# Patient Record
Sex: FEMALE | Race: WHITE | NOT HISPANIC OR LATINO | Employment: UNEMPLOYED | URBAN - METROPOLITAN AREA
[De-identification: names, ages, dates, MRNs, and addresses within clinical notes are randomized per-mention and may not be internally consistent; named-entity substitution may affect disease eponyms.]

---

## 2017-01-05 ENCOUNTER — APPOINTMENT (OUTPATIENT)
Dept: NUTRITION | Facility: HOSPITAL | Age: 47
End: 2017-01-05
Attending: ANESTHESIOLOGY

## 2017-02-02 ENCOUNTER — OFFICE VISIT (OUTPATIENT)
Dept: NUTRITION | Facility: HOSPITAL | Age: 47
End: 2017-02-02
Attending: ANESTHESIOLOGY

## 2017-02-02 VITALS — WEIGHT: 189.8 LBS | HEIGHT: 63 IN | BODY MASS INDEX: 33.63 KG/M2

## 2017-02-02 PROCEDURE — 97802 MEDICAL NUTRITION INDIV IN: CPT

## 2017-02-02 NOTE — CONSULTS
Adult Outpatient Nutrition  Assessment/PES    Patient Name:  Gino Maguire  YOB: 1970  MRN: [id number]    Assessment Date:  2/2/2017          General Info       02/02/17 1059    Today's Session    Person(s) attending today's session Patient     Services Used Today? No    General Information    How well do you speak English? very well    Do you speak a language other than English at home? no    Are you able to read and write English? Yes    Lives With spouse;child(erik), dependent   7 people total             Physical Findings       02/02/17 1219    Physical Findings/Assessment    Additional Documentation Physical Appearance (Group)    Physical Appearance    Overall Physical Appearance obese              Nutritional Info/Activity       02/02/17 1100    Nutritional Information    Have you had weight changes? Yes    Describe weight changes 30# gain since June, stress/emotional eating and lack of activity from foot and hip pain     What is your desired body weight? 72.6 kg (160 lb)   145-160#     Have you tried to lose weight before? Yes    List programs tried, date, and success Exercise, Weight Watchers    What is your motivation to lose weight? Health    Food Allergies/Intolerances --   fresh tomatoes, dairy, peanuts, soy,     History of eating disorder? No    Do you have difficulty chewing food? No    Who Prepares Meals self    List any food cravings/trigger foods you have chocolate, coffee drinks, sometimes icecream, cooking with butter     How often during the day do you find yourself snacking? none, unless stress eating     Food Behaviors Stress eater;Comfort eater    How often do you eat out and where? 4 times per week, usually dinner, fast food or sit down chain     Do you use Food Assistance programs (WIC, food stamps, food bank)? no    Do you need information about Food Assistance programs? no    How many times do you drink milk per day? 0   almond milk, no dairy     How many times  "do you eat fruit per day? 3    How many times do you eat vegetables per day? 3    How many times do you drink juice per day? 1    How many times do you eat candy/chocolates per day? 3   3-4    How many times do you eat ice cream per day? 1   was nightly, but not currently     How many regular sodas do you drink per day? 0    How many times do you have caffeine per day? 5   4-5, someitmes black, vanilla almond milk, reg almond milk, or starbucks  frap     How many meals do you eat each day? 2   skip breakfast     How many snacks do you eat each day? --   stress eats through out the day     What is the biggest challenge you have with your diet? Poor choices;Eating out;Other (comment)   stress eating snacks     What type of support do you currently use to help you with your health issues? family     Enter everything you can remember eating in the last 24 hours (1 day) coffee vanilla almond milk, 2 pieces of wheat toast with butter and strawberry jam, OJ, 2 bottles of water, cheese burger, french fries, sweet tea, panera estrellita soup, grilled cheese, sweet tea     Eating Environment    Eating environment Family    Physical Activity    Are you currently involved in an activity/exercise program?  No    Reasons for Inactivity Limited activity   limited by pain, was previously walking, and yoga               Estimated/Assessed Needs       02/02/17 1123    Calculation Measurements    Weight Used For Calculations 86.1 kg (189 lb 12.8 oz)    Height Used for Calculations 1.6 m (5' 3\")    Estimated/Assessed Energy Needs    Energy Need Method [facility]    Age 46    [facility] ([facility]) 1460.06    Activity Factors ([place] [facility] Jeor)  Confined to bed  1.2    Estimated Kcal Range  1200 - 1300 calories for gradual weight loss                 Problem/Interventions:        Problem 1       02/02/17 1134    Nutrition Diagnoses Problem 1    Problem 1 Overweight/Obesity    Etiology (related to) Factors Affecting Nutrition    " Food Habit/Preferences Other   eating out and sweets     Signs/Symptoms (evidenced by) BMI    BMI 30 - 34.9                    Intervention Goal       02/02/17 1221    Intervention Goal    General Maintain nutrition    PO Meet estimated needs    Weight Appropriate weight loss              Comments:  Patient has a desire to lose weight.         Health literacy assessment not completed.      Patient describes problems with eating with emotional and stress eating.  Patient does not use artificial sweeteners and chooses sugary drinks throughout the day.  Patient previously was very successful following a gestational diabetes meal plan.      During the session we discussed the plate method, reading nutrition labels, and meal planning. We also discussed healthy food selection.      Patient was given a [facility] Weight Loss Toolkit, recommended meal plan, and sample menu.      Patient appeared to be engaged during the session and responded appropriately to questions.       Goals:  1. Follow 1918-2778 calorie meal plan by carb counting.  2. Have foot injection by next appointment. So she can start walking again.    3.  Lose weight .5# per/2# per month.    4.    Patient was provided with RD's contact information. Follow up scheduled for 02/23/17 @ 10:30 AM.        Thank you for this referral.    Electronically signed by:  Sydnee Candelario RD  02/02/17 4:35 PM

## 2017-02-06 ENCOUNTER — OUTSIDE FACILITY SERVICE (OUTPATIENT)
Dept: PAIN MEDICINE | Facility: CLINIC | Age: 47
End: 2017-02-06

## 2017-02-06 PROCEDURE — 99152 MOD SED SAME PHYS/QHP 5/>YRS: CPT | Performed by: ANESTHESIOLOGY

## 2017-02-06 PROCEDURE — 64483 NJX AA&/STRD TFRM EPI L/S 1: CPT | Performed by: ANESTHESIOLOGY

## 2017-03-08 ENCOUNTER — OFFICE VISIT (OUTPATIENT)
Dept: NUTRITION | Facility: HOSPITAL | Age: 47
End: 2017-03-08
Attending: ANESTHESIOLOGY

## 2017-03-08 VITALS — WEIGHT: 186 LBS | HEIGHT: 63 IN | BODY MASS INDEX: 32.96 KG/M2

## 2017-03-08 NOTE — PROGRESS NOTES
Adult Outpatient Nutrition  Assessment    Patient Name:  Gino Maguire  YOB: 1970  MRN: [id number]        Assessment Date:  3/8/2017                        Comments:  Patient is seen today for ongoing weight management and follow up. Today's weight 186#. Patient lost 3.8# since her previous  visit.  Patient has been walking daily at least 20-30 minutes.  She prefers to walk in her neighborhood, but walks at the mall when the weather is bad.  She has not been counting her calories or carbohydrates, but she is focusing on making better choices to reduce her calorie intake.  She has also been focusing on drinking more water and less soda.  Patient has been experiencing some season depression and continued grief from the loss of her father in law.  She is also feeling very stressed by the lack of organization in her home.      Patient would like to continue with her current habits and focus on dealings with some of the other issues with which she is currently struggling.  Patient is planning to work with a Phycologist and see a counselor.      Patient encouraged to to continue with postive lifestyle changes.     Goals:  1. Follow 2655-1431 calorie meal plan by carb counting. - 50%   2. Have foot injection by next appointment. So she can start walking again. - 100%   3. Lose weight .5# per/2# per month. - 100%     A continued follow up is scheduled for 04/19/17 @ 11:15 AM.  Thank you for this referral.          Electronically signed by:  Sydnee Candelario RD  03/08/17 5:06 PM

## 2017-04-07 ENCOUNTER — OFFICE VISIT (OUTPATIENT)
Dept: INTERNAL MEDICINE | Facility: CLINIC | Age: 47
End: 2017-04-07

## 2017-04-07 VITALS
HEART RATE: 67 BPM | BODY MASS INDEX: 34.91 KG/M2 | OXYGEN SATURATION: 100 % | WEIGHT: 197 LBS | SYSTOLIC BLOOD PRESSURE: 128 MMHG | TEMPERATURE: 98.4 F | DIASTOLIC BLOOD PRESSURE: 84 MMHG | HEIGHT: 63 IN

## 2017-04-07 DIAGNOSIS — R23.3 EASY BRUISING: ICD-10-CM

## 2017-04-07 DIAGNOSIS — N83.201 CYST OF RIGHT OVARY: ICD-10-CM

## 2017-04-07 DIAGNOSIS — R53.83 FATIGUE, UNSPECIFIED TYPE: Primary | ICD-10-CM

## 2017-04-07 DIAGNOSIS — E55.9 VITAMIN D DEFICIENCY: ICD-10-CM

## 2017-04-07 PROCEDURE — 99214 OFFICE O/P EST MOD 30 MIN: CPT | Performed by: HOSPITALIST

## 2017-04-07 RX ORDER — NORTRIPTYLINE HYDROCHLORIDE 10 MG/1
1-2 CAPSULE ORAL NIGHTLY
Refills: 0 | COMMUNITY
Start: 2017-03-20 | End: 2018-01-09

## 2017-04-07 RX ORDER — BUPROPION HYDROCHLORIDE 300 MG/1
150 TABLET ORAL DAILY
COMMUNITY
End: 2017-09-22

## 2017-04-07 RX ORDER — FLUOXETINE HYDROCHLORIDE 40 MG/1
1 CAPSULE ORAL DAILY
Refills: 0 | COMMUNITY
Start: 2017-03-06 | End: 2017-06-05

## 2017-04-07 NOTE — PROGRESS NOTES
Subjective   Gino Maguire is a 47 y.o. female. URI; Bleeding/Bruising; requesting lab work; Med Refill (vitamin D); Back Pain (lower back); and Hip Pain         HPI Comments: She is bruising easily for the last few months    URI    Associated symptoms include nausea.   Back Pain     Hip Pain          The following portions of the patient's history were reviewed and updated as appropriate: allergies, current medications, past family history, past medical history, past social history, past surgical history and problem list.    Review of Systems   Constitutional: Positive for fatigue.   Gastrointestinal: Positive for nausea.   Musculoskeletal: Positive for back pain.   Hematological: Bruises/bleeds easily.       Objective   Vitals:    04/07/17 1215   BP: 128/84   Pulse: 67   Temp: 98.4 °F (36.9 °C)   SpO2: 100%       Physical Exam   Constitutional: She is oriented to person, place, and time. She appears well-developed and well-nourished.   HENT:   Head: Normocephalic and atraumatic.   Right Ear: External ear normal.   Left Ear: External ear normal.   Eyes: Conjunctivae and EOM are normal. Pupils are equal, round, and reactive to light.   Neck: Normal range of motion. Neck supple.   Cardiovascular: Normal rate, regular rhythm and normal heart sounds.    Pulmonary/Chest: Effort normal and breath sounds normal.   Abdominal: Soft. Bowel sounds are normal. She exhibits no distension. There is tenderness.   ruq   Musculoskeletal: Normal range of motion.   Neurological: She is alert and oriented to person, place, and time.   Skin: Skin is warm and dry.   Psychiatric: She has a normal mood and affect. Her behavior is normal. Judgment and thought content normal.       Assessment/Plan   Gino was seen today for uri, bleeding/bruising, requesting lab work, med refill, back pain and hip pain.    Diagnoses and all orders for this visit:    Fatigue, unspecified type  -     Comprehensive metabolic panel  -     CBC &  Differential  -     Protime-INR    Easy bruising  -     CBC & Differential  -     APTT  -     Protime-INR    Cyst of right ovary  -     US Non-ob Transvaginal        Results for orders placed or performed during the hospital encounter of 11/09/16   Comprehensive Metabolic Panel   Result Value Ref Range    Glucose 85 70 - 100 mg/dL    BUN 11 9 - 23 mg/dL    Creatinine 0.90 0.60 - 1.30 mg/dL    Sodium 137 132 - 146 mmol/L    Potassium 4.6 3.5 - 5.5 mmol/L    Chloride 100 99 - 109 mmol/L    CO2 33.0 (H) 20.0 - 31.0 mmol/L    Calcium 10.0 8.7 - 10.4 mg/dL    Total Protein 8.8 (H) 5.7 - 8.2 g/dL    Albumin 5.10 (H) 3.20 - 4.80 g/dL    ALT (SGPT) 40 7 - 40 U/L    AST (SGOT) 35 (H) 0 - 33 U/L    Alkaline Phosphatase 114 (H) 25 - 100 U/L    Total Bilirubin 0.7 0.3 - 1.2 mg/dL    eGFR Non African Amer 67 >60 mL/min/1.73    Globulin 3.7 gm/dL    A/G Ratio 1.4 g/dL    BUN/Creatinine Ratio 12.2 7.0 - 25.0    Anion Gap 4.0 3.0 - 11.0 mmol/L   Lipase   Result Value Ref Range    Lipase 21 6 - 51 U/L   Urinalysis With / Culture If Indicated   Result Value Ref Range    Color, UA Yellow Yellow, Straw    Appearance, UA Clear Clear    pH, UA 6.0 5.0 - 8.0    Specific Gravity, UA 1.011 1.001 - 1.030    Glucose, UA Negative Negative    Ketones, UA Negative Negative    Bilirubin, UA Negative Negative    Blood, UA Negative Negative    Protein, UA Negative Negative    Leuk Esterase, UA Negative Negative    Nitrite, UA Negative Negative    Urobilinogen, UA 0.2 E.U./dL 0.2 E.U./dL, 1.0 E.U./dL   CBC Auto Differential   Result Value Ref Range    WBC 10.25 3.50 - 10.80 10*3/mm3    RBC 4.78 3.89 - 5.14 10*6/mm3    Hemoglobin 14.2 11.5 - 15.5 g/dL    Hematocrit 43.0 34.5 - 44.0 %    MCV 90.0 80.0 - 99.0 fL    MCH 29.7 27.0 - 31.0 pg    MCHC 33.0 32.0 - 36.0 g/dL    RDW 15.1 (H) 11.3 - 14.5 %    RDW-SD 49.6 37.0 - 54.0 fl    MPV 9.5 6.0 - 12.0 fL    Platelets 350 150 - 450 10*3/mm3    Neutrophil % 63.9 41.0 - 71.0 %    Lymphocyte % 27.8 24.0 -  44.0 %    Monocyte % 4.3 0.0 - 12.0 %    Eosinophil % 3.6 (H) 0.0 - 3.0 %    Basophil % 0.2 0.0 - 1.0 %    Immature Grans % 0.2 0.0 - 0.6 %    Neutrophils, Absolute 6.55 1.50 - 8.30 10*3/mm3    Lymphocytes, Absolute 2.85 0.60 - 4.80 10*3/mm3    Monocytes, Absolute 0.44 0.00 - 1.00 10*3/mm3    Eosinophils, Absolute 0.37 (H) 0.10 - 0.30 10*3/mm3    Basophils, Absolute 0.02 0.00 - 0.20 10*3/mm3    Immature Grans, Absolute 0.02 0.00 - 0.03 10*3/mm3   POCT pregnancy, urine   Result Value Ref Range    HCG, Urine, QL Negative Negative    Lot Number [id number]     Internal Positive Control Positive     Internal Negative Control Negative    Light Blue Top   Result Value Ref Range    Extra Tube hold for add-on    Green Top (Gel)   Result Value Ref Range    Extra Tube Hold for add-ons.    Lavender Top   Result Value Ref Range    Extra Tube hold for add-on    Gold Top - SST   Result Value Ref Range    Extra Tube Hold for add-ons.

## 2017-04-08 ENCOUNTER — HOSPITAL ENCOUNTER (OUTPATIENT)
Dept: ULTRASOUND IMAGING | Facility: HOSPITAL | Age: 47
End: 2017-04-08
Attending: HOSPITALIST

## 2017-04-14 ENCOUNTER — HOSPITAL ENCOUNTER (OUTPATIENT)
Dept: ULTRASOUND IMAGING | Facility: HOSPITAL | Age: 47
Discharge: HOME | End: 2017-04-14
Attending: HOSPITALIST | Admitting: HOSPITALIST

## 2017-04-14 PROCEDURE — 76830 TRANSVAGINAL US NON-OB: CPT

## 2017-04-14 RX ORDER — GABAPENTIN 100 MG/1
CAPSULE ORAL
Qty: 120 CAPSULE | Refills: 3 | Status: SHIPPED | OUTPATIENT
Start: 2017-04-14 | End: 2017-08-24 | Stop reason: SDUPTHER

## 2017-04-21 ENCOUNTER — TELEPHONE (OUTPATIENT)
Dept: INTERNAL MEDICINE | Facility: CLINIC | Age: 47
End: 2017-04-21

## 2017-04-21 NOTE — TELEPHONE ENCOUNTER
Returned pt's call and advised of US test result per lab letter, pt also wants to know if her results of her bloodwork, Dr. Cesar blood work is scanned into pt's chart, not sure if you were referring to blood work also in the letter or just the US, pls advise.

## 2017-05-17 ENCOUNTER — HOSPITAL ENCOUNTER (OUTPATIENT)
Dept: NUTRITION | Facility: HOSPITAL | Age: 47
Setting detail: RECURRING SERIES
Discharge: HOME | End: 2017-05-17
Attending: ANESTHESIOLOGY

## 2017-05-17 VITALS — WEIGHT: 197 LBS | HEIGHT: 63 IN | BODY MASS INDEX: 34.91 KG/M2

## 2017-05-17 PROCEDURE — 97803 MED NUTRITION INDIV SUBSEQ: CPT

## 2017-06-05 ENCOUNTER — OFFICE VISIT (OUTPATIENT)
Dept: INTERNAL MEDICINE | Facility: CLINIC | Age: 47
End: 2017-06-05

## 2017-06-05 VITALS
HEART RATE: 74 BPM | WEIGHT: 196.6 LBS | SYSTOLIC BLOOD PRESSURE: 112 MMHG | OXYGEN SATURATION: 99 % | RESPIRATION RATE: 18 BRPM | BODY MASS INDEX: 34.83 KG/M2 | DIASTOLIC BLOOD PRESSURE: 78 MMHG

## 2017-06-05 DIAGNOSIS — M25.551 PAIN OF RIGHT HIP JOINT: Primary | ICD-10-CM

## 2017-06-05 PROCEDURE — 99213 OFFICE O/P EST LOW 20 MIN: CPT | Performed by: PHYSICIAN ASSISTANT

## 2017-06-05 NOTE — PROGRESS NOTES
Chief Complaint   Patient presents with   • Follow-up     Hip Pain       Subjective   Gino Maguire is a 47 y.o. female.       History of Present Illness     Pt has had ongoing right hip pain for years. She knows there is mild arthritis in her hip, based on xray. Has seen Dr Linda for her lower back- her lower back pain has gotten better since the injections. Feels like the hip pain is getting worse instead of better. She has appt for PT scheduled. Feels like she needs to walk to lose weight. She notes that she did PT for her hip in the past without any improvement. No injury that she can remember. Started hurting about 6 years ago, worse when she laid on her right side. Over the last 3 years it has gotten worse, more painful with all activities.       Current Outpatient Prescriptions:   •  Cyanocobalamin 5000 MCG/ML liquid, Place  under the tongue., Disp: , Rfl:   •  cyclobenzaprine (FLEXERIL) 10 MG tablet, Take 1/2 tablet by mouth 3 (three) times a day as needed for muscle spasms for up to 30 days, Disp: 45 tablet, Rfl: 2  •  FLOVENT  MCG/ACT inhaler, inhale 2 puffs by mouth twice a day Rinse mouth after use, Disp: 12 g, Rfl: 2  •  fluticasone (FLONASE) 50 MCG/ACT nasal spray, 2 sprays into each nostril daily. Administer 2 sprays in each nostril for each dose., Disp: , Rfl:   •  lansoprazole (PREVACID) 30 MG capsule, Take 1 capsule by mouth Daily., Disp: 30 capsule, Rfl: 5  •  metoclopramide (REGLAN) 10 MG tablet, Take  by mouth., Disp: , Rfl:   •  nortriptyline (PAMELOR) 10 MG capsule, Take 1-2 capsules by mouth Every Night., Disp: , Rfl: 0  •  ondansetron ODT (ZOFRAN-ODT) 4 MG disintegrating tablet, Take 1 tablet by mouth Every 6 (Six) Hours As Needed for Nausea or Vomiting., Disp: 12 tablet, Rfl: 0  •  propranolol (INDERAL) 10 MG tablet, , Disp: , Rfl: 0  •  vitamin D (ERGOCALCIFEROL) 87758 UNITS capsule capsule, Take 1 capsule by mouth 1 (One) Time Per Week., Disp: , Rfl: 0  •  buPROPion XL  (WELLBUTRIN XL) 300 MG 24 hr tablet, 150 mg Daily., Disp: , Rfl:   •  gabapentin (NEURONTIN) 100 MG capsule, take 1 capsule by mouth once daily for 3 days 1 capsule twice a day for 3 days 1 capsule three times a day for 3 days then 1 capsule four ti, Disp: 120 capsule, Rfl: 3     PMFSH  The following portions of the patient's history were reviewed and updated as appropriate: allergies, current medications, past family history, past medical history, past social history, past surgical history and problem list.    Review of Systems   Constitutional: Negative for fever and unexpected weight change.   HENT: Negative.    Eyes: Negative.    Respiratory: Negative for chest tightness, shortness of breath and wheezing.    Cardiovascular: Negative.    Gastrointestinal: Negative for abdominal pain.   Endocrine: Negative.    Genitourinary: Negative.    Musculoskeletal: Positive for arthralgias.   Skin: Negative for color change, rash and wound.   Allergic/Immunologic: Negative.    Neurological: Negative for seizures and syncope.   Hematological: Negative for adenopathy. Does not bruise/bleed easily.   Psychiatric/Behavioral: Negative for confusion.       Objective   /78  Pulse 74  Resp 18  Wt 196 lb 9.6 oz (89.2 kg)  SpO2 99%  BMI 34.83 kg/m2    Physical Exam   Constitutional: She appears well-developed and well-nourished.   HENT:   Head: Normocephalic.   Right Ear: Hearing, tympanic membrane, external ear and ear canal normal.   Left Ear: Hearing, tympanic membrane, external ear and ear canal normal.   Nose: Nose normal.   Mouth/Throat: Oropharynx is clear and moist.   Eyes: Conjunctivae are normal. Pupils are equal, round, and reactive to light.   Neck: Normal range of motion.   Cardiovascular: Normal rate, regular rhythm and normal heart sounds.    Pulmonary/Chest: Effort normal and breath sounds normal. She has no decreased breath sounds. She has no wheezes. She has no rhonchi. She has no rales.   Musculoskeletal:         Right hip: She exhibits decreased range of motion, decreased strength and tenderness. She exhibits no bony tenderness, no swelling, no crepitus and no deformity.   Neurological: She is alert.   Skin: Skin is warm and dry.   Psychiatric: She has a normal mood and affect. Her behavior is normal.   Nursing note and vitals reviewed.           ASSESSMENT/PLAN    Problem List Items Addressed This Visit        Nervous and Auditory    Arthralgia of hip - Primary     Check MRI of hip. Pt will also discuss pain with Dr Linda. Further recommendations based on results.         Relevant Orders    MRI hip right wo contrast               Return for Next scheduled follow up.

## 2017-06-06 NOTE — ASSESSMENT & PLAN NOTE
Check MRI of hip. Pt will also discuss pain with Dr Linda. Further recommendations based on results.

## 2017-06-08 ENCOUNTER — TELEPHONE (OUTPATIENT)
Dept: INTERNAL MEDICINE | Facility: CLINIC | Age: 47
End: 2017-06-08

## 2017-06-08 NOTE — TELEPHONE ENCOUNTER
6/8/17    Do you want to call in some medication or do you want the pt to come in again. Last OV was on 6/5/17 due to pain of right hip joint.

## 2017-06-08 NOTE — TELEPHONE ENCOUNTER
THINKS SHE HAS SHINGLES AGAIN AND WOULD LIKE TO KNOW IF DR SCHNEIDER CAN CALL IN [facility] OR DOES SHE NEED TO BE SEEN AGAIN. PLEASE RETURN CALL [phone]

## 2017-06-09 ENCOUNTER — HOSPITAL ENCOUNTER (OUTPATIENT)
Dept: MRI IMAGING | Facility: HOSPITAL | Age: 47
Discharge: HOME | End: 2017-06-09
Admitting: PHYSICIAN ASSISTANT

## 2017-06-09 DIAGNOSIS — M25.551 PAIN OF RIGHT HIP JOINT: ICD-10-CM

## 2017-06-09 PROCEDURE — 73721 MRI JNT OF LWR EXTRE W/O DYE: CPT

## 2017-06-12 NOTE — PROGRESS NOTES
Pls let her know that the MRI of her hip showed a low grade strain towards the right and front sides of her hip. Otherwise it looks fine.

## 2017-06-13 ENCOUNTER — TELEPHONE (OUTPATIENT)
Dept: INTERNAL MEDICINE | Facility: CLINIC | Age: 47
End: 2017-06-13

## 2017-06-13 DIAGNOSIS — M79.671 PAIN OF RIGHT HEEL: Primary | ICD-10-CM

## 2017-06-13 DIAGNOSIS — M25.551 PAIN OF RIGHT HIP JOINT: ICD-10-CM

## 2017-06-13 NOTE — TELEPHONE ENCOUNTER
----- Message from Rosamaria JOHNSON MA sent at 6/13/2017 10:58 AM EDT -----  Pt returned call and was informed of results, pt would like to be advised on what she should do at this point.

## 2017-06-14 NOTE — TELEPHONE ENCOUNTER
Called and informed pt of Blaire's message, pt would like to proceed with referral to see ortho, she also wanted Blaire to know that she is going to [facility] on Monday, printed MRI report for pt to  per pt's request to bring to PT.

## 2017-06-21 ENCOUNTER — HOSPITAL ENCOUNTER (EMERGENCY)
Facility: HOSPITAL | Age: 47
Discharge: HOME | End: 2017-06-21
Attending: EMERGENCY MEDICINE | Admitting: EMERGENCY MEDICINE

## 2017-06-21 ENCOUNTER — APPOINTMENT (OUTPATIENT)
Dept: GENERAL RADIOLOGY | Facility: HOSPITAL | Age: 47
End: 2017-06-21

## 2017-06-21 VITALS
HEART RATE: 66 BPM | WEIGHT: 194 LBS | TEMPERATURE: 98.6 F | DIASTOLIC BLOOD PRESSURE: 75 MMHG | RESPIRATION RATE: 18 BRPM | HEIGHT: 69 IN | SYSTOLIC BLOOD PRESSURE: 116 MMHG | OXYGEN SATURATION: 97 % | BODY MASS INDEX: 28.73 KG/M2

## 2017-06-21 DIAGNOSIS — M62.838 MUSCLE SPASM: ICD-10-CM

## 2017-06-21 DIAGNOSIS — R07.89 CHEST PAIN, ATYPICAL: Primary | ICD-10-CM

## 2017-06-21 DIAGNOSIS — R74.8 ELEVATED LIPASE: ICD-10-CM

## 2017-06-21 LAB
ALBUMIN SERPL-MCNC: 4.3 G/DL (ref 3.2–4.8)
ALBUMIN/GLOB SERPL: 1.4 G/DL (ref 1.5–2.5)
ALP SERPL-CCNC: 84 U/L (ref 25–100)
ALT SERPL W P-5'-P-CCNC: 48 U/L (ref 7–40)
ANION GAP SERPL CALCULATED.3IONS-SCNC: 8 MMOL/L (ref 3–11)
AST SERPL-CCNC: 55 U/L (ref 0–33)
B-HCG UR QL: NEGATIVE
BASOPHILS # BLD AUTO: 0.03 10*3/MM3 (ref 0–0.2)
BASOPHILS NFR BLD AUTO: 0.3 % (ref 0–1)
BILIRUB SERPL-MCNC: 0.4 MG/DL (ref 0.3–1.2)
BILIRUB UR QL STRIP: NEGATIVE
BNP SERPL-MCNC: 19 PG/ML (ref 0–100)
BUN BLD-MCNC: 14 MG/DL (ref 9–23)
BUN/CREAT SERPL: 17.5 (ref 7–25)
CALCIUM SPEC-SCNC: 9.6 MG/DL (ref 8.7–10.4)
CHLORIDE SERPL-SCNC: 106 MMOL/L (ref 99–109)
CK SERPL-CCNC: 142 U/L (ref 26–174)
CLARITY UR: CLEAR
CO2 SERPL-SCNC: 25 MMOL/L (ref 20–31)
COLOR UR: YELLOW
CREAT BLD-MCNC: 0.8 MG/DL (ref 0.6–1.3)
D DIMER PPP FEU-MCNC: <0.19 MG/L (FEU) (ref 0–0.5)
DEPRECATED RDW RBC AUTO: 49.3 FL (ref 37–54)
EOSINOPHIL # BLD AUTO: 0.58 10*3/MM3 (ref 0.1–0.3)
EOSINOPHIL NFR BLD AUTO: 6.2 % (ref 0–3)
ERYTHROCYTE [DISTWIDTH] IN BLOOD BY AUTOMATED COUNT: 13.9 % (ref 11.3–14.5)
GFR SERPL CREATININE-BSD FRML MDRD: 76 ML/MIN/1.73
GLOBULIN UR ELPH-MCNC: 3.1 GM/DL
GLUCOSE BLD-MCNC: 94 MG/DL (ref 70–100)
GLUCOSE UR STRIP-MCNC: NEGATIVE MG/DL
HCT VFR BLD AUTO: 39.5 % (ref 34.5–44)
HGB BLD-MCNC: 12.6 G/DL (ref 11.5–15.5)
HGB UR QL STRIP.AUTO: NEGATIVE
HOLD SPECIMEN: NORMAL
HOLD SPECIMEN: NORMAL
IMM GRANULOCYTES # BLD: 0.02 10*3/MM3 (ref 0–0.03)
IMM GRANULOCYTES NFR BLD: 0.2 % (ref 0–0.6)
INTERNAL NEGATIVE CONTROL: NEGATIVE
INTERNAL POSITIVE CONTROL: POSITIVE
KETONES UR QL STRIP: NEGATIVE
LEUKOCYTE ESTERASE UR QL STRIP.AUTO: NEGATIVE
LIPASE SERPL-CCNC: 315 U/L (ref 6–51)
LYMPHOCYTES # BLD AUTO: 3.15 10*3/MM3 (ref 0.6–4.8)
LYMPHOCYTES NFR BLD AUTO: 33.5 % (ref 24–44)
Lab: NORMAL
MAGNESIUM SERPL-MCNC: 2 MG/DL (ref 1.3–2.7)
MCH RBC QN AUTO: 30.7 PG (ref 27–31)
MCHC RBC AUTO-ENTMCNC: 31.9 G/DL (ref 32–36)
MCV RBC AUTO: 96.3 FL (ref 80–99)
MONOCYTES # BLD AUTO: 0.69 10*3/MM3 (ref 0–1)
MONOCYTES NFR BLD AUTO: 7.3 % (ref 0–12)
NEUTROPHILS # BLD AUTO: 4.92 10*3/MM3 (ref 1.5–8.3)
NEUTROPHILS NFR BLD AUTO: 52.5 % (ref 41–71)
NITRITE UR QL STRIP: NEGATIVE
PH UR STRIP.AUTO: 6 [PH] (ref 5–8)
PLAT MORPH BLD: NORMAL
PLATELET # BLD AUTO: 316 10*3/MM3 (ref 150–450)
PMV BLD AUTO: 10 FL (ref 6–12)
POTASSIUM BLD-SCNC: 4.3 MMOL/L (ref 3.5–5.5)
PROT SERPL-MCNC: 7.4 G/DL (ref 5.7–8.2)
PROT UR QL STRIP: NEGATIVE
RBC # BLD AUTO: 4.1 10*6/MM3 (ref 3.89–5.14)
RBC MORPH BLD: NORMAL
SODIUM BLD-SCNC: 139 MMOL/L (ref 132–146)
SP GR UR STRIP: 1.02 (ref 1–1.03)
TROPONIN I SERPL-MCNC: 0 NG/ML (ref 0–0.07)
UROBILINOGEN UR QL STRIP: NORMAL
WBC MORPH BLD: NORMAL
WBC NRBC COR # BLD: 9.39 10*3/MM3 (ref 3.5–10.8)
WHOLE BLOOD HOLD SPECIMEN: NORMAL
WHOLE BLOOD HOLD SPECIMEN: NORMAL

## 2017-06-21 PROCEDURE — 81003 URINALYSIS AUTO W/O SCOPE: CPT | Performed by: NURSE PRACTITIONER

## 2017-06-21 PROCEDURE — 93005 ELECTROCARDIOGRAM TRACING: CPT | Performed by: EMERGENCY MEDICINE

## 2017-06-21 PROCEDURE — 96360 HYDRATION IV INFUSION INIT: CPT

## 2017-06-21 PROCEDURE — 83735 ASSAY OF MAGNESIUM: CPT | Performed by: NURSE PRACTITIONER

## 2017-06-21 PROCEDURE — 84484 ASSAY OF TROPONIN QUANT: CPT

## 2017-06-21 PROCEDURE — 83690 ASSAY OF LIPASE: CPT | Performed by: EMERGENCY MEDICINE

## 2017-06-21 PROCEDURE — 96361 HYDRATE IV INFUSION ADD-ON: CPT

## 2017-06-21 PROCEDURE — 85379 FIBRIN DEGRADATION QUANT: CPT | Performed by: NURSE PRACTITIONER

## 2017-06-21 PROCEDURE — 80053 COMPREHEN METABOLIC PANEL: CPT | Performed by: EMERGENCY MEDICINE

## 2017-06-21 PROCEDURE — 85007 BL SMEAR W/DIFF WBC COUNT: CPT | Performed by: EMERGENCY MEDICINE

## 2017-06-21 PROCEDURE — 71010 HC CHEST PA OR AP: CPT

## 2017-06-21 PROCEDURE — 99284 EMERGENCY DEPT VISIT MOD MDM: CPT

## 2017-06-21 PROCEDURE — 85025 COMPLETE CBC W/AUTO DIFF WBC: CPT | Performed by: EMERGENCY MEDICINE

## 2017-06-21 PROCEDURE — 82550 ASSAY OF CK (CPK): CPT | Performed by: NURSE PRACTITIONER

## 2017-06-21 PROCEDURE — 83880 ASSAY OF NATRIURETIC PEPTIDE: CPT | Performed by: EMERGENCY MEDICINE

## 2017-06-21 RX ORDER — SODIUM CHLORIDE 0.9 % (FLUSH) 0.9 %
10 SYRINGE (ML) INJECTION AS NEEDED
Status: DISCONTINUED | OUTPATIENT
Start: 2017-06-21 | End: 2017-06-21 | Stop reason: HOSPADM

## 2017-06-21 RX ORDER — ONDANSETRON 4 MG/1
4 TABLET, FILM COATED ORAL EVERY 8 HOURS PRN
Qty: 14 TABLET | Refills: 0 | Status: SHIPPED | OUTPATIENT
Start: 2017-06-21 | End: 2017-11-14 | Stop reason: SDUPTHER

## 2017-06-21 RX ORDER — HYDROCODONE BITARTRATE AND ACETAMINOPHEN 5; 325 MG/1; MG/1
1 TABLET ORAL EVERY 8 HOURS PRN
Qty: 8 TABLET | Refills: 0 | Status: SHIPPED | OUTPATIENT
Start: 2017-06-21 | End: 2017-07-18

## 2017-06-21 RX ADMIN — SODIUM CHLORIDE 1000 ML: 9 INJECTION, SOLUTION INTRAVENOUS at 01:06

## 2017-06-21 NOTE — ED PROVIDER NOTES
"Subjective   HPI Comments: Patient presents to the emergency department with complaint of sudden onset of shortness of breath followed by chest pain while she was laying in bed reading.  This occurred approximately 1 hour prior to arrival.  Patient denies any numbness or tingling to her hands or face.  Her chest pain is resolved at the time of my examination.  Patient has no history of coronary artery disease.    Patient volunteers that \"I am under a lot of stress currently\".  She recognizes these symptoms as similar presentation in intensity to previous occasions of \"panic attack\".  With onset of her symptoms she took a Xanax and an aspirin at home prior to arrival.    Patient is a 47 y.o. female presenting with chest pain.   History provided by:  Patient and spouse   used: No    Chest Pain   Pain location:  L chest  Pain quality: crushing    Pain radiates to:  Does not radiate  Pain severity:  Moderate  Onset quality:  Sudden  Duration:  1 hour  Timing:  Constant  Progression:  Resolved  Chronicity:  New  Context: breathing, at rest and stress    Context: not drug use, not eating, not movement and not trauma    Relieved by:  Aspirin (Patient took aspirin and Xanax at home prior to arrival)  Worsened by:  Exertion and deep breathing  Associated symptoms: anxiety, fatigue, nausea and shortness of breath    Associated symptoms: no abdominal pain, no altered mental status, no back pain, no cough, no diaphoresis, no dizziness, no fever, no headache, no near-syncope, no numbness, no palpitations, no syncope, no vomiting and no weakness    Risk factors: high cholesterol and obesity    Risk factors: no coronary artery disease, no diabetes mellitus, no hypertension, not pregnant, no prior DVT/PE and no smoking        Review of Systems   Constitutional: Positive for fatigue. Negative for diaphoresis and fever.   HENT: Negative for sore throat.    Eyes: Negative.  Negative for pain and visual disturbance. "   Respiratory: Positive for shortness of breath. Negative for cough, wheezing and stridor.    Cardiovascular: Positive for chest pain. Negative for palpitations, leg swelling, syncope and near-syncope.   Gastrointestinal: Positive for nausea. Negative for abdominal pain, diarrhea and vomiting.   Endocrine: Negative.    Genitourinary: Negative.  Negative for dysuria.   Musculoskeletal: Negative.  Negative for back pain and neck pain.   Skin: Negative.  Negative for pallor and rash.   Allergic/Immunologic: Negative.    Neurological: Negative.  Negative for dizziness, syncope, weakness, numbness and headaches.   Hematological: Negative.    Psychiatric/Behavioral: Negative for agitation. The patient is nervous/anxious.    All other systems reviewed and are negative.      Past Medical History:   Diagnosis Date   • Abdominal pain    • Abdominal pain, RUQ     Check cmp. refer to [facility] u/s and gen surg eval d/t cholelithiasis noted on CT of abd/pelvis donein ER 1/16   • Acute bronchitis     Take cefdinir and medrol dose pack as directed. Use otc mucinex. Continue to rest and push fluids. Call or rtc if no better. Gave phenergan w/ codeine cough syrup 5 ml po prn #120ml, no RF per Dr. Cesar   • Acute pharyngitis     Check for monod/t exposure/ Pt will restart flovent as used for esophagitis. If no improvement take the medrol dose pack she was given at least visit   • Acute sinusitis    • Acute upper respiratory infection    • Body aches    • Cholelithiasis     Reviewed CT of adb/pelvis from ER 1/16 showed cholelithiasis. Will refer for gen surgery eval and [facility] u/s   • Chronic pain disorder    • Cough    • Eosinophilic esophagitis    • Extremity pain    • Gestational diabetes    • Headache    • Influenza    • Labyrinthitis    • Low back pain    • Lumbar strain    • Lumbosacral disc disease    • Neck pain    • Right hip pain    • Shingles    • Strain of thoracic region    • Viral infection    • Viral pharyngitis        Allergies    Allergen Reactions   • Labetalol    • Maxalt [Rizatriptan]    • Sulfa Antibiotics    • Sumatriptan    • Zolmitriptan        Past Surgical History:   Procedure Laterality Date   •  SECTION      97, 00, 04, 12   • DILATATION AND CURETTAGE     • ENDOSCOPY     • WISDOM TOOTH EXTRACTION         Family History   Problem Relation Age of Onset   • Arthritis Mother    • Hypertension Mother    • Thyroid disease Mother    • Arthritis Father    • Hypertension Father    • Heart disease Father    • Heart attack Other    • Arthritis Other    • Hypertension Other    • Migraines Other    • Stroke Other        Social History     Social History   • Marital status:      Spouse name: N/A   • Number of children: N/A   • Years of education: N/A     Social History Main Topics   • Smoking status: Former Smoker   • Smokeless tobacco: Never Used   • Alcohol use No   • Drug use: No   • Sexual activity: Defer     Other Topics Concern   • Not on file     Social History Narrative           Objective   Physical Exam   Constitutional: She is oriented to person, place, and time. She appears well-developed and well-nourished. No distress.   HENT:   Head: Normocephalic and atraumatic.   Right Ear: External ear normal.   Left Ear: External ear normal.   Nose: Nose normal.   Mouth/Throat: Oropharynx is clear and moist.   Eyes: Conjunctivae and EOM are normal. Pupils are equal, round, and reactive to light.   Neck: Normal range of motion. Neck supple. No JVD present. No tracheal deviation present.   Cardiovascular: Normal rate and regular rhythm.    Pulmonary/Chest: Effort normal. No stridor. She has no wheezes. She has no rales.   Abdominal: Soft. Bowel sounds are normal. She exhibits no distension and no mass. There is no rebound and no guarding. No hernia.   Musculoskeletal: Normal range of motion. She exhibits no edema, tenderness or deformity.   Lymphadenopathy:     She has no cervical adenopathy.    Neurological: She is alert and oriented to person, place, and time. She has normal reflexes.   Skin: Skin is warm and dry. She is not diaphoretic.   Psychiatric: She has a normal mood and affect. Thought content normal.   Nursing note and vitals reviewed.      Procedures         ED Course  ED Course   Value Comment By Time   Lipase: (!) 315 (Reviewed) Garrett Cantu MD 06/21 0145    Upon reassessment, patient has no abdominal pain.  She acknowledges several days of preceding nausea. CLAY Arteaga 06/21 0230    After conferring with Dr. Cantu, patient and  agree with outpatient management with follow-up for repeat lipase.  Patient will have GI rest with clear fluids only for the next 24 hours with pain medication and nausea medication.They both understand parameters for concern which would warrant return to the emergency department. Colleen Virgen, CLAY 06/21 0231      Recent Results (from the past 24 hour(s))   Comprehensive Metabolic Panel    Collection Time: 06/21/17 12:45 AM   Result Value Ref Range    Glucose 94 70 - 100 mg/dL    BUN 14 9 - 23 mg/dL    Creatinine 0.80 0.60 - 1.30 mg/dL    Sodium 139 132 - 146 mmol/L    Potassium 4.3 3.5 - 5.5 mmol/L    Chloride 106 99 - 109 mmol/L    CO2 25.0 20.0 - 31.0 mmol/L    Calcium 9.6 8.7 - 10.4 mg/dL    Total Protein 7.4 5.7 - 8.2 g/dL    Albumin 4.30 3.20 - 4.80 g/dL    ALT (SGPT) 48 (H) 7 - 40 U/L    AST (SGOT) 55 (H) 0 - 33 U/L    Alkaline Phosphatase 84 25 - 100 U/L    Total Bilirubin 0.4 0.3 - 1.2 mg/dL    eGFR Non African Amer 76 >60 mL/min/1.73    Globulin 3.1 gm/dL    A/G Ratio 1.4 (L) 1.5 - 2.5 g/dL    BUN/Creatinine Ratio 17.5 7.0 - 25.0    Anion Gap 8.0 3.0 - 11.0 mmol/L   Lipase    Collection Time: 06/21/17 12:45 AM   Result Value Ref Range    Lipase 315 (H) 6 - 51 U/L   BNP    Collection Time: 06/21/17 12:45 AM   Result Value Ref Range    BNP 19.0 0.0 - 100.0 pg/mL   Light Blue Top    Collection Time: 06/21/17 12:45 AM    Result Value Ref Range    Extra Tube hold for add-on    Green Top (Gel)    Collection Time: 06/21/17 12:45 AM   Result Value Ref Range    Extra Tube Hold for add-ons.    Lavender Top    Collection Time: 06/21/17 12:45 AM   Result Value Ref Range    Extra Tube hold for add-on    Gold Top - SST    Collection Time: 06/21/17 12:45 AM   Result Value Ref Range    Extra Tube Hold for add-ons.    CBC Auto Differential    Collection Time: 06/21/17 12:45 AM   Result Value Ref Range    WBC 9.39 3.50 - 10.80 10*3/mm3    RBC 4.10 3.89 - 5.14 10*6/mm3    Hemoglobin 12.6 11.5 - 15.5 g/dL    Hematocrit 39.5 34.5 - 44.0 %    MCV 96.3 80.0 - 99.0 fL    MCH 30.7 27.0 - 31.0 pg    MCHC 31.9 (L) 32.0 - 36.0 g/dL    RDW 13.9 11.3 - 14.5 %    RDW-SD 49.3 37.0 - 54.0 fl    MPV 10.0 6.0 - 12.0 fL    Platelets 316 150 - 450 10*3/mm3    Neutrophil % 52.5 41.0 - 71.0 %    Lymphocyte % 33.5 24.0 - 44.0 %    Monocyte % 7.3 0.0 - 12.0 %    Eosinophil % 6.2 (H) 0.0 - 3.0 %    Basophil % 0.3 0.0 - 1.0 %    Immature Grans % 0.2 0.0 - 0.6 %    Neutrophils, Absolute 4.92 1.50 - 8.30 10*3/mm3    Lymphocytes, Absolute 3.15 0.60 - 4.80 10*3/mm3    Monocytes, Absolute 0.69 0.00 - 1.00 10*3/mm3    Eosinophils, Absolute 0.58 (H) 0.10 - 0.30 10*3/mm3    Basophils, Absolute 0.03 0.00 - 0.20 10*3/mm3    Immature Grans, Absolute 0.02 0.00 - 0.03 10*3/mm3   D-dimer, Quantitative    Collection Time: 06/21/17 12:45 AM   Result Value Ref Range    D-Dimer, Quantitative <0.19 0.00 - 0.50 mg/L (FEU)   CK    Collection Time: 06/21/17 12:45 AM   Result Value Ref Range    Creatine Kinase 142 26 - 174 U/L   Magnesium    Collection Time: 06/21/17 12:45 AM   Result Value Ref Range    Magnesium 2.0 1.3 - 2.7 mg/dL   Scan Slide    Collection Time: 06/21/17 12:45 AM   Result Value Ref Range    RBC Morphology Normal Normal    WBC Morphology Normal Normal    Platelet Morphology Normal Normal   POC Troponin, Rapid    Collection Time: 06/21/17 12:49 AM   Result Value Ref Range  "   Troponin I 0.00 0.00 - 0.07 ng/mL   Urinalysis With / Culture If Indicated    Collection Time: 06/21/17  1:01 AM   Result Value Ref Range    Color, UA Yellow Yellow, Straw    Appearance, UA Clear Clear    pH, UA 6.0 5.0 - 8.0    Specific Gravity, UA 1.024 1.001 - 1.030    Glucose, UA Negative Negative    Ketones, UA Negative Negative    Bilirubin, UA Negative Negative    Blood, UA Negative Negative    Protein, UA Negative Negative    Leuk Esterase, UA Negative Negative    Nitrite, UA Negative Negative    Urobilinogen, UA 1.0 E.U./dL 0.2 - 1.0 E.U./dL   POCT pregnancy, urine    Collection Time: 06/21/17  1:07 AM   Result Value Ref Range    HCG, Urine, QL Negative Negative    Lot Number [id number]     Internal Positive Control Positive     Internal Negative Control Negative      Note: In addition to lab results from this visit, the labs listed above may include labs taken at another facility or during a different encounter within the last 24 hours. Please correlate lab times with ED admission and discharge times for further clarification of the services performed during this visit.    XR Chest 1 View   Final Result   Abnormal     No acute abnormality.      THIS DOCUMENT HAS BEEN ELECTRONICALLY SIGNED BY SHANNA ORTEGA MD        Vitals:    06/21/17 0024 06/21/17 0100 06/21/17 0107 06/21/17 0200   BP:  149/90  143/82   Pulse: 80 79  68   Resp: 18      Temp: 98.6 °F (37 °C)      TempSrc: Oral      SpO2: 100% 100%  99%   Weight:   194 lb 0.1 oz (88 kg)    Height:   69\" (175.3 cm)      Medications   sodium chloride 0.9 % flush 10 mL (not administered)   sodium chloride 0.9 % flush 10 mL (not administered)   sodium chloride 0.9 % bolus 1,000 mL (1,000 mL Intravenous New Bag 6/21/17 0106)     ECG/EMG Results (last 24 hours)     Procedure Component Value Units Date/Time    ECG 12 Lead [[id number]] Collected:  06/21/17 0023     Updated:  06/21/17 0033    Narrative:       Test Reason : chest pain  Blood Pressure : **/** mmHG  Vent. " Rate : 079 BPM     Atrial Rate : 079 BPM     P-R Int : 146 ms          QRS Dur : 082 ms      QT Int : 352 ms       P-R-T Axes : 005 057 054 degrees     QTc Int : 403 ms    Normal sinus rhythm  No previous ECGs available  Confirmed by TRAVIS JOHNSON MD ([id number]) on 6/21/2017 12:33:43 AM    Referred By:  EDMD           Confirmed By:TRAVIS JOHNSON MD                    [facility]    Final diagnoses:   Chest pain, atypical   Elevated lipase            Colleen Virgen, CLAY  06/21/17 0236       CLAY Arteaga  06/21/17 0307

## 2017-06-21 NOTE — DISCHARGE INSTRUCTIONS
Only clear fluids for the next 24 hours.  Medications as directed.  Follow-up with your primary care provider for repeat lab evaluation for comparison.  Return to the emergency department as needed for worsening symptoms or concerns including, but not limited to: Fever, intractable pain, or intractable vomiting.

## 2017-06-22 ENCOUNTER — OFFICE VISIT (OUTPATIENT)
Dept: INTERNAL MEDICINE | Facility: CLINIC | Age: 47
End: 2017-06-22

## 2017-06-22 VITALS
OXYGEN SATURATION: 98 % | HEART RATE: 72 BPM | DIASTOLIC BLOOD PRESSURE: 70 MMHG | WEIGHT: 197 LBS | SYSTOLIC BLOOD PRESSURE: 110 MMHG | BODY MASS INDEX: 29.09 KG/M2

## 2017-06-22 DIAGNOSIS — R60.9 EDEMA, UNSPECIFIED TYPE: ICD-10-CM

## 2017-06-22 DIAGNOSIS — F41.9 ANXIETY: Primary | ICD-10-CM

## 2017-06-22 PROCEDURE — 99214 OFFICE O/P EST MOD 30 MIN: CPT | Performed by: NURSE PRACTITIONER

## 2017-06-22 RX ORDER — ALPRAZOLAM 0.25 MG/1
0.25 TABLET ORAL 2 TIMES DAILY PRN
COMMUNITY
End: 2017-06-22 | Stop reason: DRUGHIGH

## 2017-06-22 RX ORDER — POTASSIUM CHLORIDE 750 MG/1
10 TABLET, EXTENDED RELEASE ORAL 2 TIMES DAILY
Qty: 60 TABLET | Refills: 0 | Status: SHIPPED | OUTPATIENT
Start: 2017-06-22 | End: 2017-07-18

## 2017-06-22 RX ORDER — FUROSEMIDE 20 MG/1
20 TABLET ORAL 2 TIMES DAILY
Qty: 30 TABLET | Refills: 1 | Status: SHIPPED | OUTPATIENT
Start: 2017-06-22 | End: 2017-07-27 | Stop reason: SDUPTHER

## 2017-06-22 RX ORDER — ALPRAZOLAM 0.5 MG/1
TABLET ORAL
Qty: 60 TABLET | Refills: 0 | Status: SHIPPED | OUTPATIENT
Start: 2017-06-22 | End: 2017-11-01 | Stop reason: SDUPTHER

## 2017-06-22 NOTE — PROGRESS NOTES
Subjective  Follow-up (ER follow up/CB for SOB, chest pains, panic attack )      Gino Maguire is a 47 y.o. female.   Allergies   Allergen Reactions   • Labetalol    • Maxalt [Rizatriptan]    • Sulfa Antibiotics    • Sumatriptan    • Zolmitriptan      History of Present Illness      Went to er yesterday for cp and soa , had benign exam and was told she had anxiety, does have anxiety many times a week has one xanax left   Has been retaining fluid  For 2-3 months, has worsened lately in legs, feet and hands , doesn't notice in mouth or lips  The following portions of the patient's history were reviewed and updated as appropriate: allergies, current medications, past family history, past medical history, past social history, past surgical history and problem list.    Review of Systems   Cardiovascular: Positive for leg swelling.   Psychiatric/Behavioral: The patient is nervous/anxious.    All other systems reviewed and are negative.      Objective   Physical Exam   Constitutional: She is oriented to person, place, and time. She appears well-developed and well-nourished.   HENT:   Head: Normocephalic and atraumatic.   Eyes: Conjunctivae are normal.   Neck: Neck supple. No thyromegaly present.   Cardiovascular: Normal rate, regular rhythm and normal heart sounds.    argelia hands and feet with trace- 1+ non pitting edema   Pulmonary/Chest: Effort normal and breath sounds normal.   Lymphadenopathy:     She has no cervical adenopathy.   Neurological: She is alert and oriented to person, place, and time.   Skin: Skin is warm and dry.   Psychiatric: She has a normal mood and affect. Her behavior is normal. Judgment and thought content normal.   Nursing note and vitals reviewed.    /70  Pulse 72  Wt 197 lb (89.4 kg)  SpO2 98%  BMI 29.09 kg/m2    Assessment/Plan     Problem List Items Addressed This Visit        Other    Anxiety - Primary    Relevant Medications    ALPRAZolam (XANAX) 0.5 MG tablet      Other Visit  Diagnoses     Edema, unspecified type        Relevant Medications    furosemide (LASIX) 20 MG tablet    potassium chloride (K-DUR,KLOR-CON) 10 MEQ CR tablet             I have reviewed er notes and labs ,   The patient has read and signed the [facility] [place] Controlled Substance Contract.  I will continue to see patient for regular follow up appointments.  They are well controlled on their medication.  CHITRA has been reviewed by me and is updated every 3 months. The patient is aware of the potential for addiction and dependence.    rtc 2 mo

## 2017-06-23 RX ORDER — ACYCLOVIR 800 MG/1
800 TABLET ORAL 4 TIMES DAILY
Qty: 40 TABLET | Refills: 0 | Status: SHIPPED | OUTPATIENT
Start: 2017-06-23 | End: 2017-07-03

## 2017-06-27 ENCOUNTER — APPOINTMENT (OUTPATIENT)
Dept: NUTRITION | Facility: HOSPITAL | Age: 47
End: 2017-06-27
Attending: ANESTHESIOLOGY

## 2017-06-27 RX ORDER — CYCLOBENZAPRINE HCL 10 MG
TABLET ORAL
Qty: 45 TABLET | Refills: 2 | OUTPATIENT
Start: 2017-06-27 | End: 2017-07-18

## 2017-07-18 ENCOUNTER — OFFICE VISIT (OUTPATIENT)
Dept: ORTHOPEDIC SURGERY | Facility: CLINIC | Age: 47
End: 2017-07-18

## 2017-07-18 VITALS
DIASTOLIC BLOOD PRESSURE: 91 MMHG | WEIGHT: 190 LBS | HEART RATE: 75 BPM | SYSTOLIC BLOOD PRESSURE: 139 MMHG | BODY MASS INDEX: 32.44 KG/M2 | HEIGHT: 64 IN

## 2017-07-18 DIAGNOSIS — M70.61 TROCHANTERIC BURSITIS OF BOTH HIPS: Primary | ICD-10-CM

## 2017-07-18 DIAGNOSIS — M22.2X1 PATELLOFEMORAL STRESS SYNDROME OF BOTH KNEES: ICD-10-CM

## 2017-07-18 DIAGNOSIS — R52 PAIN: ICD-10-CM

## 2017-07-18 DIAGNOSIS — M70.62 TROCHANTERIC BURSITIS OF BOTH HIPS: Primary | ICD-10-CM

## 2017-07-18 DIAGNOSIS — M22.2X2 PATELLOFEMORAL STRESS SYNDROME OF BOTH KNEES: ICD-10-CM

## 2017-07-18 PROCEDURE — 99244 OFF/OP CNSLTJ NEW/EST MOD 40: CPT | Performed by: ORTHOPAEDIC SURGERY

## 2017-07-18 PROCEDURE — 20610 DRAIN/INJ JOINT/BURSA W/O US: CPT | Performed by: ORTHOPAEDIC SURGERY

## 2017-07-18 RX ORDER — TRIAMCINOLONE ACETONIDE 40 MG/ML
80 INJECTION, SUSPENSION INTRA-ARTICULAR; INTRAMUSCULAR
Status: COMPLETED | OUTPATIENT
Start: 2017-07-18 | End: 2017-07-18

## 2017-07-18 RX ORDER — POTASSIUM CHLORIDE 750 MG/1
TABLET, FILM COATED, EXTENDED RELEASE ORAL
Refills: 0 | COMMUNITY
Start: 2017-06-22 | End: 2018-01-09 | Stop reason: SDUPTHER

## 2017-07-18 RX ORDER — LIDOCAINE HYDROCHLORIDE 10 MG/ML
3 INJECTION, SOLUTION INFILTRATION; PERINEURAL
Status: COMPLETED | OUTPATIENT
Start: 2017-07-18 | End: 2017-07-18

## 2017-07-18 RX ORDER — BUPIVACAINE HYDROCHLORIDE 2.5 MG/ML
3 INJECTION, SOLUTION INFILTRATION; PERINEURAL
Status: COMPLETED | OUTPATIENT
Start: 2017-07-18 | End: 2017-07-18

## 2017-07-18 RX ADMIN — BUPIVACAINE HYDROCHLORIDE 3 ML: 2.5 INJECTION, SOLUTION INFILTRATION; PERINEURAL at 11:49

## 2017-07-18 RX ADMIN — LIDOCAINE HYDROCHLORIDE 3 ML: 10 INJECTION, SOLUTION INFILTRATION; PERINEURAL at 11:49

## 2017-07-18 RX ADMIN — BUPIVACAINE HYDROCHLORIDE 3 ML: 2.5 INJECTION, SOLUTION INFILTRATION; PERINEURAL at 11:47

## 2017-07-18 RX ADMIN — TRIAMCINOLONE ACETONIDE 80 MG: 40 INJECTION, SUSPENSION INTRA-ARTICULAR; INTRAMUSCULAR at 11:49

## 2017-07-18 RX ADMIN — TRIAMCINOLONE ACETONIDE 80 MG: 40 INJECTION, SUSPENSION INTRA-ARTICULAR; INTRAMUSCULAR at 11:47

## 2017-07-18 RX ADMIN — LIDOCAINE HYDROCHLORIDE 3 ML: 10 INJECTION, SOLUTION INFILTRATION; PERINEURAL at 11:47

## 2017-07-18 NOTE — PROGRESS NOTES
Orthopaedic Clinic Note: Hip New Patient    Chief Complaint   Patient presents with   • Left Knee - Pain   • Right Knee - Pain   • Right Hip - Pain        HPI    Conn full consult from [facility]     Gino Maguire is a 47 y.o. female who presents with bilateral hip and knee pain for Greater than one year.  Onset has been atraumatic and gradual in nature.  The hip pain is localized to lateral trochanter regions bilaterally.  Her knee pain is localized to the anterior knee bilaterally.  Her pain is worse with walking, climbing steps, rising from a seated position.  Her hip pain is worse with lying on the affected side.  Her symptoms are eased with sitting and resting with elevating the legs and ibuprofen.  She rates the pain a 4/10 on the pain scale.  Apart from anti-inflammatories and physical therapy for previous back pain, she has had no interventions to date.  She denies any fevers, chills, constitutional symptoms.  She states she does have some significant limitations as a result of this ongoing bilateral lower extremity pain.      Past Medical History:   Diagnosis Date   • Abdominal pain    • Abdominal pain, RUQ     Check cmp. refer to [facility] u/s and gen surg eval d/t cholelithiasis noted on CT of abd/pelvis donein ER 1/16   • Acute bronchitis     Take cefdinir and medrol dose pack as directed. Use otc mucinex. Continue to rest and push fluids. Call or rtc if no better. Gave phenergan w/ codeine cough syrup 5 ml po prn #120ml, no RF per Dr. Cesar   • Acute pharyngitis     Check for monod/t exposure/ Pt will restart flovent as used for esophagitis. If no improvement take the medrol dose pack she was given at least visit   • Acute sinusitis    • Acute upper respiratory infection    • Body aches    • Cholelithiasis     Reviewed CT of adb/pelvis from ER 1/16 showed cholelithiasis. Will refer for gen surgery eval and [facility] u/s   • Chronic pain disorder    • Cough    • Eosinophilic esophagitis    • Extremity pain     • Gestational diabetes    • Headache    • Influenza    • Labyrinthitis    • Low back pain    • Lumbar strain    • Lumbosacral disc disease    • Neck pain    • Right hip pain    • Shingles    • Strain of thoracic region    • Viral infection    • Viral pharyngitis       Past Surgical History:   Procedure Laterality Date   •  SECTION      97, 00, 04, 12   • DILATATION AND CURETTAGE     • ENDOSCOPY     • WISDOM TOOTH EXTRACTION        Social History     Social History   • Marital status:      Spouse name: N/A   • Number of children: N/A   • Years of education: N/A     Occupational History   • Not on file.     Social History Main Topics   • Smoking status: Former Smoker   • Smokeless tobacco: Never Used   • Alcohol use No   • Drug use: No   • Sexual activity: Defer     Other Topics Concern   • Not on file     Social History Narrative      Current Outpatient Prescriptions on File Prior to Visit   Medication Sig Dispense Refill   • ALPRAZolam (XANAX) 0.5 MG tablet One po bid prn 60 tablet 0   • buPROPion XL (WELLBUTRIN XL) 300 MG 24 hr tablet 150 mg Daily.     • Cyanocobalamin 5000 MCG/ML liquid Place  under the tongue.     • FLOVENT  MCG/ACT inhaler inhale 2 puffs by mouth twice a day Rinse mouth after use 12 g 2   • furosemide (LASIX) 20 MG tablet Take 1 tablet by mouth 2 (Two) Times a Day. 30 tablet 1   • gabapentin (NEURONTIN) 100 MG capsule take 1 capsule by mouth once daily for 3 days 1 capsule twice a day for 3 days 1 capsule three times a day for 3 days then 1 capsule four ti 120 capsule 3   • lansoprazole (PREVACID) 30 MG capsule Take 1 capsule by mouth Daily. 30 capsule 5   • metoclopramide (REGLAN) 10 MG tablet Take  by mouth.     • nortriptyline (PAMELOR) 10 MG capsule Take 1-2 capsules by mouth Every Night.  0   • ondansetron (ZOFRAN) 4 MG tablet Take 1 tablet by mouth Every 8 (Eight) Hours As Needed for Nausea or Vomiting. 14 tablet 0   • propranolol (INDERAL) 10  "MG tablet   0   • vitamin D (ERGOCALCIFEROL) 96796 UNITS capsule capsule Take 1 capsule by mouth 1 (One) Time Per Week.  0   • [DISCONTINUED] cyclobenzaprine (FLEXERIL) 10 MG tablet Take 1/2 tablet by mouth 3 (three) times a day as needed for muscle spasms for up to 30 days 45 tablet 2   • [DISCONTINUED] HYDROcodone-acetaminophen (NORCO) 5-325 MG per tablet Take 1 tablet by mouth Every 8 (Eight) Hours As Needed for Moderate Pain (4-6). 8 tablet 0   • [DISCONTINUED] potassium chloride (K-DUR,KLOR-CON) 10 MEQ CR tablet Take 1 tablet by mouth 2 (Two) Times a Day. 60 tablet 0     No current facility-administered medications on file prior to visit.       Allergies   Allergen Reactions   • Labetalol    • Maxalt [Rizatriptan]    • Sulfa Antibiotics    • Sumatriptan    • Zolmitriptan         Review of Systems   Constitutional: Positive for activity change.   Eyes: Negative.    Respiratory: Negative.    Cardiovascular: Positive for leg swelling.   Gastrointestinal: Positive for abdominal pain and nausea.   Endocrine: Negative.    Genitourinary: Negative.    Musculoskeletal: Positive for arthralgias.   Skin: Negative.    Allergic/Immunologic: Negative.    Neurological: Positive for weakness, numbness and headaches.   Hematological: Negative.    Psychiatric/Behavioral: The patient is nervous/anxious.         The following portions of the patient's history were reviewed and updated as appropriate: allergies, current medications, past family history, past medical history, past social history, past surgical history and problem list.    Physical Exam  Blood pressure 139/91, pulse 75, height 63.5\" (161.3 cm), weight 190 lb (86.2 kg).    Body mass index is 33.13 kg/(m^2).    GENERAL APPEARANCE: awake, alert & oriented x 3, in no acute distress and well developed, well nourished  PSYCH: normal affect  LUNGS:  breathing nonlabored  EYES: sclera anicteric  CARDIOVASCULAR: palpable dorsalis pedis, palpable posterior tibial bilaterally. " Capillary refill less than 2 seconds  EXTREMITIES: no clubbing, cyanosis  GAIT:  Trendelenberg           Right Hip Exam:  RANGE OF MOTION:   FLEXION CONTRACTURE: None   FLEXION: 110 degrees   INTERNAL ROTATION: 20 degrees at 90 degrees of flexion   EXTERNAL ROTATION: 40 degrees at 90 degrees of flexion    PAIN WITH HIP MOTION: no  PAIN WITH LOGROLL: no  STINCHFIELD TEST: negative    KNEE EXAM: full knee ROM (0-130), stable to varus/valgus stress at 0 and 30 degrees, nontender to palpation about medial and lateral joint line.  She does have positive patellar compression test with positive anterior knee pain when squatting.  No knee effusion.     STRENGTH:  5/5 hip adduction, abduction, flexion. 5/5 strength knee flexion, extension. 5/5 strength ankle dorsiflexion and plantarflexion.     GREATER TROCHANTER BURSAL PAIN:  Yes, exquisite     REFLEXES:   PATELLAR 2+/4   ACHILLES 2+/4    CLONUS: negative  STRAIGHT LEG TEST:   negative    SENSATION TO LIGHT TOUCH:  DEEP PERONEAL/SUPERFICIAL PERONEAL/SURAL/SAPHENOUS/TIBIAL:  intact    EDEMA:   no  ERYTHEMA:  no  WOUNDS/INCISIONS: none, no overlying skin problems.      Left Hip Exam:   RANGE OF MOTION:   FLEXION CONTRACTURE: None   FLEXION: 110 degrees   INTERNAL ROTATION: 20 degrees at 90 degrees of flexion   EXTERNAL ROTATION: 40 degrees at 90 degrees of flexion    PAIN WITH HIP MOTION: no  PAIN WITH LOGROLL: no  STINCHFIELD TEST: negative    KNEE EXAM: full knee ROM (0-130), stable to varus/valgus stress at 0 and 30 degrees, nontender to palpation about medial and lateral joint line.  She does have positive patellar compression test with positive anterior knee pain when squatting.  No knee effusion.     STRENGTH:  5/5 hip adduction, abduction, flexion. 5/5 strength knee flexion, extension. 5/5 strength ankle dorsiflexion and plantarflexion.     GREATER TROCHANTER BURSAL PAIN:  Yes, exquisite     REFLEXES:   PATELLAR 2+/4   ACHILLES 2+/4    CLONUS: negative  STRAIGHT LEG  TEST:   negative    SENSATION TO LIGHT TOUCH:  DEEP PERONEAL/SUPERFICIAL PERONEAL/SURAL/SAPHENOUS/TIBIAL:  intact    EDEMA:   no  ERYTHEMA:  no  WOUNDS/INCISIONS: none, no overlying skin problems.      ------------------------------------------------------------------    LEG LENGTHS:  equal  _____________________________________________________  _____________________________________________________    RADIOGRAPHIC FINDINGS:   X-rays bilateral knees and pelvic MRI in Epic were reviewed.  X-rays demonstrate no significant osteoarthritic changes in the knees.  MRI of the pelvis demonstrates no intra-articular pathology.  There is some limited evidence of inflammation along the trochanteric bursal region and the vastus lateralis.      Assessment/Plan:   Diagnosis Plan   1. Trochanteric bursitis of both hips  Ambulatory Referral to Physical Therapy Evaluate and treat   2. Pain  Large Joint Arthrocentesis    Large Joint Arthrocentesis   3. Patellofemoral stress syndrome of both knees  Ambulatory Referral to Physical Therapy Evaluate and treat     Regards to her hip pain, this is related trochanteric bursitis and I recommended corticosteroid injections the bilateral trochanteric bursa today.  She was agreeable to this plan.  Her knee pain is related to patellofemoral arthralgia which is best treated with physical therapy.  I will refer her to physical therapy with follow-up in 3 months for repeat evaluation.    Procedure Note:  I discussed with the patient the potential benefits of performing a therapeutic injections of the bilateral hip trochanteric bursa as well as potential risks including but not limited to infection, swelling, pain, bleeding, bruising, nerve/vessel damage, skin color changes, transient elevation in blood glucose levels, and fat atrophy. After informed consent and after the areas were prepped with alcohol, ethyl chloride was used to numb the skin. Via the direct lateral approach, 3cc of 1% lidocaine,  3cc of 0.25% marcaine and 2 cc of 40mg/ml of Kenalog were each injected into the bilateral hip trochanteric bursa. The patient tolerated the procedures well. There were no complications. A sterile dressing was placed over each injection site.    Shay Rock MD  07/18/17  11:51 AM

## 2017-07-18 NOTE — PROGRESS NOTES
Procedure   Large Joint Arthrocentesis  Date/Time: 7/18/2017 11:47 AM  Consent given by: patient  Site marked: site marked  Timeout: Immediately prior to procedure a time out was called to verify the correct patient, procedure, equipment, support staff and site/side marked as required   Supporting Documentation  Indications: pain   Procedure Details  Location: hip - R greater trochanteric bursa  Preparation: Patient was prepped and draped in the usual sterile fashion  Needle size: 22 G  Medications administered: 3 mL bupivacaine; 3 mL lidocaine 1 %; 80 mg triamcinolone acetonide 40 MG/ML  Patient tolerance: patient tolerated the procedure well with no immediate complications

## 2017-07-18 NOTE — PROGRESS NOTES
Procedure   Large Joint Arthrocentesis  Date/Time: 7/18/2017 11:49 AM  Consent given by: patient  Site marked: site marked  Timeout: Immediately prior to procedure a time out was called to verify the correct patient, procedure, equipment, support staff and site/side marked as required   Supporting Documentation  Indications: pain   Procedure Details  Location: hip - L greater trochanteric bursa  Needle size: 22 G  Medications administered: 3 mL bupivacaine; 3 mL lidocaine 1 %; 80 mg triamcinolone acetonide 40 MG/ML  Patient tolerance: patient tolerated the procedure well with no immediate complications

## 2017-07-20 ENCOUNTER — TRANSCRIBE ORDERS (OUTPATIENT)
Dept: MAMMOGRAPHY | Facility: HOSPITAL | Age: 47
End: 2017-07-20

## 2017-07-20 DIAGNOSIS — N60.02 BREAST CYST, LEFT: Primary | ICD-10-CM

## 2017-07-25 ENCOUNTER — HOSPITAL ENCOUNTER (OUTPATIENT)
Dept: MAMMOGRAPHY | Facility: HOSPITAL | Age: 47
Discharge: HOME | End: 2017-07-25
Attending: OBSTETRICS & GYNECOLOGY | Admitting: OBSTETRICS & GYNECOLOGY

## 2017-07-25 ENCOUNTER — TRANSCRIBE ORDERS (OUTPATIENT)
Dept: MAMMOGRAPHY | Facility: HOSPITAL | Age: 47
End: 2017-07-25

## 2017-07-25 ENCOUNTER — HOSPITAL ENCOUNTER (OUTPATIENT)
Dept: ULTRASOUND IMAGING | Facility: HOSPITAL | Age: 47
Discharge: HOME | End: 2017-07-25

## 2017-07-25 DIAGNOSIS — R92.8 ABNORMAL MAMMOGRAM: Primary | ICD-10-CM

## 2017-07-25 DIAGNOSIS — N60.02 BREAST CYST, LEFT: ICD-10-CM

## 2017-07-25 PROCEDURE — 76642 ULTRASOUND BREAST LIMITED: CPT | Performed by: RADIOLOGY

## 2017-07-25 PROCEDURE — G0204 DX MAMMO INCL CAD BI: HCPCS

## 2017-07-25 PROCEDURE — 77066 DX MAMMO INCL CAD BI: CPT | Performed by: RADIOLOGY

## 2017-07-25 PROCEDURE — 77062 BREAST TOMOSYNTHESIS BI: CPT | Performed by: RADIOLOGY

## 2017-07-25 PROCEDURE — G0279 TOMOSYNTHESIS, MAMMO: HCPCS

## 2017-07-25 PROCEDURE — 76642 ULTRASOUND BREAST LIMITED: CPT

## 2017-07-27 DIAGNOSIS — R60.9 EDEMA, UNSPECIFIED TYPE: ICD-10-CM

## 2017-07-27 RX ORDER — FUROSEMIDE 20 MG/1
TABLET ORAL
Qty: 30 TABLET | Refills: 1 | Status: SHIPPED | OUTPATIENT
Start: 2017-07-27 | End: 2017-08-30 | Stop reason: SDUPTHER

## 2017-08-02 ENCOUNTER — APPOINTMENT (OUTPATIENT)
Dept: NUTRITION | Facility: HOSPITAL | Age: 47
End: 2017-08-02
Attending: ANESTHESIOLOGY

## 2017-08-18 DIAGNOSIS — R60.9 EDEMA, UNSPECIFIED TYPE: ICD-10-CM

## 2017-08-18 RX ORDER — POTASSIUM CHLORIDE 750 MG/1
TABLET, EXTENDED RELEASE ORAL
Qty: 60 TABLET | Refills: 0 | Status: SHIPPED | OUTPATIENT
Start: 2017-08-18 | End: 2018-07-22

## 2017-08-22 ENCOUNTER — LAB REQUISITION (OUTPATIENT)
Dept: LAB | Facility: HOSPITAL | Age: 47
End: 2017-08-22

## 2017-08-22 DIAGNOSIS — N83.291 OTHER OVARIAN CYST, RIGHT SIDE: ICD-10-CM

## 2017-08-22 PROCEDURE — 84132 ASSAY OF SERUM POTASSIUM: CPT | Performed by: OBSTETRICS & GYNECOLOGY

## 2017-08-22 PROCEDURE — 88305 TISSUE EXAM BY PATHOLOGIST: CPT | Performed by: OBSTETRICS & GYNECOLOGY

## 2017-08-23 ENCOUNTER — LAB REQUISITION (OUTPATIENT)
Dept: LAB | Facility: HOSPITAL | Age: 47
End: 2017-08-23

## 2017-08-23 DIAGNOSIS — N83.291 OTHER OVARIAN CYST, RIGHT SIDE: ICD-10-CM

## 2017-08-25 LAB
CYTO UR: NORMAL
LAB AP CASE REPORT: NORMAL
LAB AP CLINICAL INFORMATION: NORMAL
Lab: NORMAL
PATH REPORT.FINAL DX SPEC: NORMAL
PATH REPORT.GROSS SPEC: NORMAL

## 2017-08-28 RX ORDER — GABAPENTIN 100 MG/1
100 CAPSULE ORAL 4 TIMES DAILY
Qty: 120 CAPSULE | Refills: 3 | OUTPATIENT
Start: 2017-08-28 | End: 2018-07-22

## 2017-08-30 DIAGNOSIS — R60.9 EDEMA, UNSPECIFIED TYPE: ICD-10-CM

## 2017-08-30 RX ORDER — FUROSEMIDE 20 MG/1
TABLET ORAL
Qty: 30 TABLET | Refills: 1 | Status: SHIPPED | OUTPATIENT
Start: 2017-08-30 | End: 2017-09-25 | Stop reason: SDUPTHER

## 2017-08-31 LAB — POTASSIUM BLDA-SCNC: 4.05 MMOL/L (ref 3.5–5.3)

## 2017-09-19 ENCOUNTER — CLINICAL SUPPORT (OUTPATIENT)
Dept: INTERNAL MEDICINE | Facility: CLINIC | Age: 47
End: 2017-09-19

## 2017-09-19 DIAGNOSIS — Z00.00 HEALTH CARE MAINTENANCE: Primary | ICD-10-CM

## 2017-09-19 PROCEDURE — 90471 IMMUNIZATION ADMIN: CPT | Performed by: NURSE PRACTITIONER

## 2017-09-19 PROCEDURE — 90686 IIV4 VACC NO PRSV 0.5 ML IM: CPT | Performed by: NURSE PRACTITIONER

## 2017-09-22 ENCOUNTER — OFFICE VISIT (OUTPATIENT)
Dept: INTERNAL MEDICINE | Facility: CLINIC | Age: 47
End: 2017-09-22

## 2017-09-22 VITALS
DIASTOLIC BLOOD PRESSURE: 76 MMHG | HEART RATE: 76 BPM | WEIGHT: 191 LBS | BODY MASS INDEX: 33.57 KG/M2 | SYSTOLIC BLOOD PRESSURE: 128 MMHG | OXYGEN SATURATION: 98 %

## 2017-09-22 DIAGNOSIS — J01.10 ACUTE NON-RECURRENT FRONTAL SINUSITIS: Primary | ICD-10-CM

## 2017-09-22 DIAGNOSIS — H81.13 VERTIGO, BENIGN POSITIONAL, BILATERAL: ICD-10-CM

## 2017-09-22 PROCEDURE — 99213 OFFICE O/P EST LOW 20 MIN: CPT | Performed by: NURSE PRACTITIONER

## 2017-09-22 RX ORDER — BUPROPION HYDROCHLORIDE 150 MG/1
1 TABLET ORAL DAILY
Refills: 0 | COMMUNITY
Start: 2017-08-17 | End: 2019-01-11 | Stop reason: SDUPTHER

## 2017-09-22 RX ORDER — AZITHROMYCIN 250 MG/1
TABLET, FILM COATED ORAL
Qty: 6 TABLET | Refills: 0 | Status: SHIPPED | OUTPATIENT
Start: 2017-09-22 | End: 2017-11-21

## 2017-09-22 RX ORDER — FLUTICASONE PROPIONATE 50 MCG
2 SPRAY, SUSPENSION (ML) NASAL DAILY
Qty: 1 BOTTLE | Refills: 1 | Status: SHIPPED | OUTPATIENT
Start: 2017-09-22 | End: 2017-10-22

## 2017-09-22 RX ORDER — LEVOMEFOLATE/ALGAL OIL 15-90.314
1 CAPSULE ORAL DAILY
Refills: 0 | COMMUNITY
Start: 2017-08-17 | End: 2018-12-12 | Stop reason: SDUPTHER

## 2017-09-22 NOTE — PROGRESS NOTES
Subjective  Follow-up (anxiety, swelling, dizziness x3-4 days)      Gino Maguire is a 47 y.o. female.   Allergies   Allergen Reactions   • Labetalol    • Maxalt [Rizatriptan]    • Sulfa Antibiotics    • Sumatriptan    • Zolmitriptan      History of Present Illness      4-5 days of swimmy head when gets up , if bends over and she gets up she feels the same , has had h/a and sinus drainage x 3-4 weeks, neck feels swollen , has been taking claritin w/o relief , no fever/chills , anxiety is better   The following portions of the patient's history were reviewed and updated as appropriate: current medications, past medical history and problem list.    Review of Systems   HENT: Positive for postnasal drip, rhinorrhea and sinus pain.    Neurological: Positive for dizziness and headaches.   All other systems reviewed and are negative.      Objective   Physical Exam   Constitutional: She is oriented to person, place, and time. She appears well-developed and well-nourished.  Non-toxic appearance. No distress.   HENT:   Head: Normocephalic and atraumatic. Hair is normal.   Right Ear: External ear normal. No drainage, swelling or tenderness. Tympanic membrane is retracted.   Left Ear: External ear normal. No drainage, swelling or tenderness. Tympanic membrane is retracted.   Nose: Mucosal edema present. No epistaxis. Right sinus exhibits frontal sinus tenderness. Left sinus exhibits frontal sinus tenderness.   Mouth/Throat: Uvula is midline and mucous membranes are normal. No oral lesions. No uvula swelling. Posterior oropharyngeal erythema present. No oropharyngeal exudate.   Eyes: Conjunctivae and EOM are normal. Pupils are equal, round, and reactive to light. Right eye exhibits no discharge. Left eye exhibits no discharge. No scleral icterus.   Neck: Normal range of motion. Neck supple.   Cardiovascular: Normal rate, regular rhythm and normal heart sounds.  Exam reveals no gallop.    No murmur heard.  Pulmonary/Chest:  Breath sounds normal. No stridor. No respiratory distress. She has no wheezes. She has no rales. She exhibits no tenderness.   Abdominal: Soft. There is no tenderness.   Lymphadenopathy:     She has cervical adenopathy.   Neurological: She is alert and oriented to person, place, and time. She exhibits normal muscle tone.   Skin: Skin is warm and dry. No rash noted. She is not diaphoretic.   Psychiatric: She has a normal mood and affect. Her behavior is normal. Judgment and thought content normal.   Nursing note and vitals reviewed.    /76  Pulse 76  Wt 191 lb (86.6 kg)  SpO2 98%  BMI 33.57 kg/m2    Assessment/Plan     Problem List Items Addressed This Visit     None      Visit Diagnoses     Acute non-recurrent frontal sinusitis    -  Primary    Relevant Medications    azithromycin (ZITHROMAX Z-CJ) 250 MG tablet    fluticasone (FLONASE) 50 MCG/ACT nasal spray    Vertigo, benign positional, bilateral            Increase fluids, flonase prescribed , slow easy movements

## 2017-09-25 DIAGNOSIS — R60.9 EDEMA, UNSPECIFIED TYPE: ICD-10-CM

## 2017-09-25 RX ORDER — FUROSEMIDE 20 MG/1
TABLET ORAL
Qty: 30 TABLET | Refills: 2 | Status: SHIPPED | OUTPATIENT
Start: 2017-09-25 | End: 2018-07-22

## 2017-10-27 ENCOUNTER — OFFICE VISIT (OUTPATIENT)
Dept: ORTHOPEDIC SURGERY | Facility: CLINIC | Age: 47
End: 2017-10-27

## 2017-10-27 VITALS
BODY MASS INDEX: 32.6 KG/M2 | HEIGHT: 63 IN | SYSTOLIC BLOOD PRESSURE: 156 MMHG | WEIGHT: 184 LBS | HEART RATE: 88 BPM | DIASTOLIC BLOOD PRESSURE: 96 MMHG

## 2017-10-27 DIAGNOSIS — M22.2X1 PATELLOFEMORAL STRESS SYNDROME OF BOTH KNEES: ICD-10-CM

## 2017-10-27 DIAGNOSIS — M25.511 RIGHT SHOULDER PAIN, UNSPECIFIED CHRONICITY: ICD-10-CM

## 2017-10-27 DIAGNOSIS — M22.2X2 PATELLOFEMORAL STRESS SYNDROME OF BOTH KNEES: ICD-10-CM

## 2017-10-27 DIAGNOSIS — M70.61 TROCHANTERIC BURSITIS OF BOTH HIPS: Primary | ICD-10-CM

## 2017-10-27 DIAGNOSIS — M75.41 IMPINGEMENT SYNDROME OF RIGHT SHOULDER: ICD-10-CM

## 2017-10-27 DIAGNOSIS — M25.551 RIGHT HIP PAIN: ICD-10-CM

## 2017-10-27 DIAGNOSIS — M70.62 TROCHANTERIC BURSITIS OF BOTH HIPS: Primary | ICD-10-CM

## 2017-10-27 PROCEDURE — 20610 DRAIN/INJ JOINT/BURSA W/O US: CPT | Performed by: ORTHOPAEDIC SURGERY

## 2017-10-27 PROCEDURE — 99213 OFFICE O/P EST LOW 20 MIN: CPT | Performed by: ORTHOPAEDIC SURGERY

## 2017-10-27 RX ORDER — LIDOCAINE HYDROCHLORIDE 10 MG/ML
3 INJECTION, SOLUTION INFILTRATION; PERINEURAL
Status: COMPLETED | OUTPATIENT
Start: 2017-10-27 | End: 2017-10-27

## 2017-10-27 RX ORDER — BUPIVACAINE HYDROCHLORIDE 5 MG/ML
3 INJECTION, SOLUTION PERINEURAL
Status: COMPLETED | OUTPATIENT
Start: 2017-10-27 | End: 2017-10-27

## 2017-10-27 RX ORDER — TRIAMCINOLONE ACETONIDE 40 MG/ML
80 INJECTION, SUSPENSION INTRA-ARTICULAR; INTRAMUSCULAR
Status: COMPLETED | OUTPATIENT
Start: 2017-10-27 | End: 2017-10-27

## 2017-10-27 RX ORDER — BUPIVACAINE HYDROCHLORIDE 2.5 MG/ML
3 INJECTION, SOLUTION INFILTRATION; PERINEURAL
Status: COMPLETED | OUTPATIENT
Start: 2017-10-27 | End: 2017-10-27

## 2017-10-27 RX ADMIN — TRIAMCINOLONE ACETONIDE 80 MG: 40 INJECTION, SUSPENSION INTRA-ARTICULAR; INTRAMUSCULAR at 10:16

## 2017-10-27 RX ADMIN — BUPIVACAINE HYDROCHLORIDE 3 ML: 2.5 INJECTION, SOLUTION INFILTRATION; PERINEURAL at 10:21

## 2017-10-27 RX ADMIN — LIDOCAINE HYDROCHLORIDE 3 ML: 10 INJECTION, SOLUTION INFILTRATION; PERINEURAL at 10:16

## 2017-10-27 RX ADMIN — TRIAMCINOLONE ACETONIDE 80 MG: 40 INJECTION, SUSPENSION INTRA-ARTICULAR; INTRAMUSCULAR at 10:21

## 2017-10-27 RX ADMIN — LIDOCAINE HYDROCHLORIDE 3 ML: 10 INJECTION, SOLUTION INFILTRATION; PERINEURAL at 10:21

## 2017-10-27 RX ADMIN — BUPIVACAINE HYDROCHLORIDE 3 ML: 5 INJECTION, SOLUTION PERINEURAL at 10:16

## 2017-10-27 NOTE — PROGRESS NOTES
Procedure   Large Joint Arthrocentesis  Date/Time: 10/27/2017 10:16 AM  Consent given by: patient  Site marked: site marked  Timeout: Immediately prior to procedure a time out was called to verify the correct patient, procedure, equipment, support staff and site/side marked as required   Supporting Documentation  Indications: pain   Procedure Details  Location: hip - R greater trochanteric bursa  Preparation: Patient was prepped and draped in the usual sterile fashion  Needle size: 22 G  Approach: lateral  Medications administered: 3 mL lidocaine 1 %; 80 mg triamcinolone acetonide 40 MG/ML; 3 mL bupivacaine  Patient tolerance: patient tolerated the procedure well with no immediate complications

## 2017-10-27 NOTE — PROGRESS NOTES
Orthopaedic Clinic Note: Hip Established Patient    Chief Complaint   Patient presents with   • Follow-up     3 month follow up: Bilateral hip trochanteric bursitis   • Follow-up     3 month follow up: Bilateral knee pain        HPI    It has been 3  month(s) since Ms. Maguire's last visit. She returns to clinic today for Follow-up of bilateral knee and bilateral hip pain.  She is also complaining of right shoulder pain today.  Patient previously received bilateral hip corticosteroid injections was referred to physical therapy for treatment of her bilateral knee and hip pain.  She states the injections provided significant pain relief for at least 3 weeks and then her pain in the right hip is gradually return.  The left hip is still asymptomatic.  Her bilateral knee pain is also improved with physical therapy.  Recently she was playing a game with her children when she was throwing a ball and felt some soreness in her right shoulder afterwards.  This is resulted in decreased shoulder range of motion.  She rates her overall pain a 4/10 on the pain scale.  She states that her pain in her hip is most exacerbated by lying on the affected side.  Shoulder ranges most exacerbated by above the head activities.  She denies fevers chills or constitutional symptoms.    Past Medical History:   Diagnosis Date   • Abdominal pain    • Abdominal pain, RUQ     Check cmp. refer to [facility] u/s and gen surg eval d/t cholelithiasis noted on CT of abd/pelvis donein ER 1/16   • Acute bronchitis     Take cefdinir and medrol dose pack as directed. Use otc mucinex. Continue to rest and push fluids. Call or rtc if no better. Gave phenergan w/ codeine cough syrup 5 ml po prn #120ml, no RF per Dr. Cesar   • Acute pharyngitis     Check for monod/t exposure/ Pt will restart flovent as used for esophagitis. If no improvement take the medrol dose pack she was given at least visit   • Acute sinusitis    • Acute upper respiratory infection    • Body  aches    • Cholelithiasis     Reviewed CT of adb/pelvis from ER  showed cholelithiasis. Will refer for gen surgery eval and GB u/s   • Chronic pain disorder    • Cough    • Eosinophilic esophagitis    • Extremity pain    • Gestational diabetes    • Headache    • Influenza    • Labyrinthitis    • Low back pain    • Lumbar strain    • Lumbosacral disc disease    • Neck pain    • Right hip pain    • Shingles    • Strain of thoracic region    • Viral infection    • Viral pharyngitis       Past Surgical History:   Procedure Laterality Date   •  SECTION      97, 00, 04, 12   • DILATATION AND CURETTAGE     • ENDOSCOPY     • WISDOM TOOTH EXTRACTION        Family History   Problem Relation Age of Onset   • Arthritis Mother    • Hypertension Mother    • Thyroid disease Mother    • Arthritis Father    • Hypertension Father    • Heart disease Father    • Heart attack Other    • Arthritis Other    • Hypertension Other    • Migraines Other    • Stroke Other      Social History     Social History   • Marital status:      Spouse name: N/A   • Number of children: N/A   • Years of education: N/A     Occupational History   • Not on file.     Social History Main Topics   • Smoking status: Former Smoker   • Smokeless tobacco: Never Used   • Alcohol use No   • Drug use: No   • Sexual activity: Defer     Other Topics Concern   • Not on file     Social History Narrative      Current Outpatient Prescriptions on File Prior to Visit   Medication Sig Dispense Refill   • ALPRAZolam (XANAX) 0.5 MG tablet One po bid prn 60 tablet 0   • azithromycin (ZITHROMAX Z-CJ) 250 MG tablet Take 2 tablets the first day, then 1 tablet daily for 4 days. 6 tablet 0   • buPROPion XL (WELLBUTRIN XL) 150 MG 24 hr tablet Take 1 tablet by mouth Daily.  0   • Cyanocobalamin 5000 MCG/ML liquid Place  under the tongue.     • FLOVENT  MCG/ACT inhaler inhale 2 puffs by mouth twice a day Rinse mouth after use 12 g 2   •  "furosemide (LASIX) 20 MG tablet take 1 tablet by mouth twice a day 30 tablet 2   • gabapentin (NEURONTIN) 100 MG capsule Take 1 capsule by mouth 4 (Four) Times a Day. 120 capsule 3   • L-Methylfolate-Algae (L-METHYLFOLATE FORTE) 15-90.314 MG capsule Take 1 tablet by mouth Daily.  0   • lansoprazole (PREVACID) 30 MG capsule Take 1 capsule by mouth Daily. 30 capsule 5   • metoclopramide (REGLAN) 10 MG tablet Take  by mouth.     • nortriptyline (PAMELOR) 10 MG capsule Take 1-2 capsules by mouth Every Night.  0   • ondansetron (ZOFRAN) 4 MG tablet Take 1 tablet by mouth Every 8 (Eight) Hours As Needed for Nausea or Vomiting. 14 tablet 0   • potassium chloride (K-DUR) 10 MEQ CR tablet take 1 tablet by mouth twice a day  0   • potassium chloride (K-DUR,KLOR-CON) 10 MEQ CR tablet take 1 tablet by mouth twice a day 60 tablet 0   • propranolol (INDERAL) 10 MG tablet   0   • vitamin D (ERGOCALCIFEROL) 46547 UNITS capsule capsule Take 1 capsule by mouth 1 (One) Time Per Week.  0     No current facility-administered medications on file prior to visit.       Allergies   Allergen Reactions   • Labetalol    • Maxalt [Rizatriptan]    • Sulfa Antibiotics    • Sumatriptan    • Zolmitriptan         Review of Systems     Physical Exam  Blood pressure 156/96, pulse 88, height 63\" (160 cm), weight 184 lb (83.5 kg).    Body mass index is 32.59 kg/(m^2).    GENERAL APPEARANCE: awake, alert, oriented, in no acute distress  LUNGS:  breathing nonlabored  EXTREMITIES: no clubbing, cyanosis  PERIPHERAL PULSES: palpable dorsalis pedis and posterior tibial pulses bilaterally.    GAIT:  Normal            Hip Exam:  Right    RANGE OF MOTION:  EXTENSION/FLEXION:  normal (0-110 degrees)  IR (at 90 degrees of flexion):  20  ER (at 90 degrees of flexion):  40  PAIN WITH HIP MOTION:  yes, Laterally over the greater trochanteric bursa  PAIN WITH LOGROLL:  no     STINCHFIELD TEST: negative    STRENGTH:  ABDUCTOR:  5/5  ADDUCTOR:  5/5  HIP FLEXION:  " 5/5    GREATER TROCHANTER BURSAL PAIN:  yes    SENSATION TO LIGHT TOUCH:  DEEP PERONEAL/SUPERFICIAL PERONEAL/SURAL/SAPHENOUS/TIBIAL:   intact    EDEMA:  no  ERYTHEMA:  no  WOUNDS/INCISIONS:  no     ----------------------------------------  Right shoulder exam reveals full shoulder range of motion which becomes painful past 90° of abduction.  She has full rotator cuff strength.  Positive Neer's and Bradley sign.  Negative speed's test.  Tender to palpation about the trapezius musculature.Intact EPL, FPL, EDC, FDP, FDS, interosseous, wrist flexion, wrist extension, biceps, triceps, deltoid. Sensation intact light touch to median, radial, ulnar, and axillary nerves. 2+ palpable radial pulse.    _________________________________________________________________  _________________________________________________________________    RADIOGRAPHIC FINDINGS:   No new imaging today    Assessment/Plan:   Diagnosis Plan   1. Trochanteric bursitis of both hips     2. Right hip pain  Large Joint Arthrocentesis   3. Right shoulder pain, unspecified chronicity  Large Joint Arthrocentesis   4. Patellofemoral stress syndrome of both knees     5. Impingement syndrome of right shoulder       Patient has continued physical therapy which is greatly improving her hip and knee pain.  She does have some residual right hip trochanteric bursitis is continuing to give her significant issues.  I recommended repeat corticosteroid injection into that trochanteric bursa today.  In regards to her shoulder, I do not see any strength deficits and I believe her symptoms are related to impingement syndrome.  I recommended a subacromial injection of that shoulder today.  She is agreeable to this plan.  She'll follow up as needed.    Procedure Note:  I discussed with the patient the potential benefits of performing a therapeutic injection of the right hip trochanteric bursa as well as potential risks including but not limited to infection, swelling, pain,  bleeding, bruising, nerve/vessel damage, skin color changes, transient elevation in blood glucose levels, and fat atrophy. After informed consent and after the area was prepped with alcohol, ethyl chloride was used to numb the skin. Via the direct lateral approach, 3cc of 1% lidocaine, 3cc of 0.25% marcaine and 2 cc of 40mg/ml of Kenalog were injected into the right hip trochanteric bursa. The patient tolerated the procedure well. There were no complications. A sterile dressing was placed over the injection site.    Procedure Note:  I discussed with the patient the potential benefits of performing a therapeutic injection of the right shoulder subacromial space as well as potential risks including but not limited to infection, swelling, pain, bleeding, bruising, nerve/vessel damage, skin color changes, transient elevation in blood glucose levels, and fat atrophy. After informed consent and after the area was prepped with alcohol, ethyl chloride was used to numb the skin. Via the posterior lateral approach, 3cc of 1% lidocaine, 3cc of 0.25% marcaine and 2 cc of 40mg/ml of Kenalog were injected into the right shoulder subacromial space. The patient tolerated the procedure well. There were no complications. A sterile dressing was placed over the injection site.      Shay Rock MD  10/27/17  10:33 AM

## 2017-10-27 NOTE — PROGRESS NOTES
Procedure   Large Joint Arthrocentesis  Date/Time: 10/27/2017 10:21 AM  Consent given by: patient  Site marked: site marked  Timeout: Immediately prior to procedure a time out was called to verify the correct patient, procedure, equipment, support staff and site/side marked as required   Supporting Documentation  Indications: pain   Procedure Details  Location: shoulder - R glenohumeral  Preparation: Patient was prepped and draped in the usual sterile fashion  Needle size: 22 G  Approach: lateral  Medications administered: 3 mL bupivacaine; 3 mL lidocaine 1 %; 80 mg triamcinolone acetonide 40 MG/ML  Patient tolerance: patient tolerated the procedure well with no immediate complications

## 2017-11-01 DIAGNOSIS — F41.9 ANXIETY: ICD-10-CM

## 2017-11-01 RX ORDER — ALPRAZOLAM 0.5 MG/1
TABLET ORAL
Qty: 60 TABLET | Refills: 0 | Status: SHIPPED | OUTPATIENT
Start: 2017-11-01 | End: 2019-07-18

## 2017-11-02 NOTE — TELEPHONE ENCOUNTER
LVM for pt to inform her of Rx being up front when she is ready to pick it up. Office number left.

## 2017-11-14 RX ORDER — FLUTICASONE PROPIONATE 220 UG/1
2 AEROSOL, METERED RESPIRATORY (INHALATION)
Qty: 12 G | Refills: 2 | Status: SHIPPED | OUTPATIENT
Start: 2017-11-14 | End: 2018-02-11 | Stop reason: SDUPTHER

## 2017-11-14 RX ORDER — ONDANSETRON 4 MG/1
4 TABLET, FILM COATED ORAL EVERY 8 HOURS PRN
Qty: 14 TABLET | Refills: 0
Start: 2017-11-14 | End: 2018-01-09 | Stop reason: SDUPTHER

## 2017-11-14 RX ORDER — ONDANSETRON 4 MG/1
4 TABLET, FILM COATED ORAL EVERY 8 HOURS PRN
Qty: 14 TABLET | Refills: 0 | Status: SHIPPED | OUTPATIENT
Start: 2017-11-14 | End: 2017-11-14 | Stop reason: SDUPTHER

## 2017-11-21 ENCOUNTER — HOSPITAL ENCOUNTER (OUTPATIENT)
Dept: CT IMAGING | Facility: HOSPITAL | Age: 47
Discharge: HOME | End: 2017-11-21
Admitting: NURSE PRACTITIONER

## 2017-11-21 ENCOUNTER — APPOINTMENT (OUTPATIENT)
Dept: LAB | Facility: HOSPITAL | Age: 47
End: 2017-11-21

## 2017-11-21 ENCOUNTER — OFFICE VISIT (OUTPATIENT)
Dept: INTERNAL MEDICINE | Facility: CLINIC | Age: 47
End: 2017-11-21

## 2017-11-21 VITALS
OXYGEN SATURATION: 98 % | DIASTOLIC BLOOD PRESSURE: 74 MMHG | BODY MASS INDEX: 33.48 KG/M2 | HEART RATE: 69 BPM | WEIGHT: 189 LBS | SYSTOLIC BLOOD PRESSURE: 120 MMHG

## 2017-11-21 DIAGNOSIS — R10.11 RIGHT UPPER QUADRANT ABDOMINAL PAIN: ICD-10-CM

## 2017-11-21 DIAGNOSIS — R10.9 FLANK PAIN: Primary | ICD-10-CM

## 2017-11-21 LAB
ALBUMIN SERPL-MCNC: 4.5 G/DL (ref 3.2–4.8)
ALBUMIN/GLOB SERPL: 1.7 G/DL (ref 1.5–2.5)
ALP SERPL-CCNC: 104 U/L (ref 25–100)
ALT SERPL W P-5'-P-CCNC: 19 U/L (ref 7–40)
AMYLASE SERPL-CCNC: 52 U/L (ref 30–118)
ANION GAP SERPL CALCULATED.3IONS-SCNC: 7 MMOL/L (ref 3–11)
AST SERPL-CCNC: 13 U/L (ref 0–33)
BASOPHILS # BLD AUTO: 0.03 10*3/MM3 (ref 0–0.2)
BASOPHILS NFR BLD AUTO: 0.2 % (ref 0–1)
BILIRUB BLD-MCNC: NEGATIVE MG/DL
BILIRUB SERPL-MCNC: 0.5 MG/DL (ref 0.3–1.2)
BUN BLD-MCNC: 13 MG/DL (ref 9–23)
BUN/CREAT SERPL: 16.3 (ref 7–25)
CALCIUM SPEC-SCNC: 9.3 MG/DL (ref 8.7–10.4)
CHLORIDE SERPL-SCNC: 104 MMOL/L (ref 99–109)
CLARITY, POC: CLEAR
CO2 SERPL-SCNC: 29 MMOL/L (ref 20–31)
COLOR UR: YELLOW
CREAT BLD-MCNC: 0.8 MG/DL (ref 0.6–1.3)
DEPRECATED RDW RBC AUTO: 52.5 FL (ref 37–54)
EOSINOPHIL # BLD AUTO: 0.14 10*3/MM3 (ref 0–0.3)
EOSINOPHIL NFR BLD AUTO: 1 % (ref 0–3)
ERYTHROCYTE [DISTWIDTH] IN BLOOD BY AUTOMATED COUNT: 14.8 % (ref 11.3–14.5)
GFR SERPL CREATININE-BSD FRML MDRD: 76 ML/MIN/1.73
GLOBULIN UR ELPH-MCNC: 2.6 GM/DL
GLUCOSE BLD-MCNC: 99 MG/DL (ref 70–100)
GLUCOSE UR STRIP-MCNC: NEGATIVE MG/DL
HAV IGM SERPL QL IA: NORMAL
HBV SURFACE AB SER RIA-ACNC: NORMAL
HCT VFR BLD AUTO: 42.9 % (ref 34.5–44)
HCV AB SER DONR QL: NORMAL
HGB BLD-MCNC: 14.1 G/DL (ref 11.5–15.5)
IMM GRANULOCYTES # BLD: 0.04 10*3/MM3 (ref 0–0.03)
IMM GRANULOCYTES NFR BLD: 0.3 % (ref 0–0.6)
KETONES UR QL: NEGATIVE
LEUKOCYTE EST, POC: NEGATIVE
LIPASE SERPL-CCNC: 26 U/L (ref 6–51)
LYMPHOCYTES # BLD AUTO: 2.69 10*3/MM3 (ref 0.6–4.8)
LYMPHOCYTES NFR BLD AUTO: 18.6 % (ref 24–44)
MCH RBC QN AUTO: 31.6 PG (ref 27–31)
MCHC RBC AUTO-ENTMCNC: 32.9 G/DL (ref 32–36)
MCV RBC AUTO: 96.2 FL (ref 80–99)
MONOCYTES # BLD AUTO: 0.86 10*3/MM3 (ref 0–1)
MONOCYTES NFR BLD AUTO: 5.9 % (ref 0–12)
NEUTROPHILS # BLD AUTO: 10.7 10*3/MM3 (ref 1.5–8.3)
NEUTROPHILS NFR BLD AUTO: 74 % (ref 41–71)
NITRITE UR-MCNC: NEGATIVE MG/ML
PH UR: 6 [PH] (ref 5–8)
PLATELET # BLD AUTO: 309 10*3/MM3 (ref 150–450)
PMV BLD AUTO: 9.2 FL (ref 6–12)
POTASSIUM BLD-SCNC: 4.2 MMOL/L (ref 3.5–5.5)
PROT SERPL-MCNC: 7.1 G/DL (ref 5.7–8.2)
PROT UR STRIP-MCNC: NEGATIVE MG/DL
RBC # BLD AUTO: 4.46 10*6/MM3 (ref 3.89–5.14)
RBC # UR STRIP: NEGATIVE /UL
SODIUM BLD-SCNC: 140 MMOL/L (ref 132–146)
SP GR UR: 1.01 (ref 1–1.03)
UROBILINOGEN UR QL: NORMAL
WBC NRBC COR # BLD: 14.46 10*3/MM3 (ref 3.5–10.8)

## 2017-11-21 PROCEDURE — 86803 HEPATITIS C AB TEST: CPT | Performed by: NURSE PRACTITIONER

## 2017-11-21 PROCEDURE — 99214 OFFICE O/P EST MOD 30 MIN: CPT | Performed by: NURSE PRACTITIONER

## 2017-11-21 PROCEDURE — 83690 ASSAY OF LIPASE: CPT | Performed by: NURSE PRACTITIONER

## 2017-11-21 PROCEDURE — 85025 COMPLETE CBC W/AUTO DIFF WBC: CPT | Performed by: NURSE PRACTITIONER

## 2017-11-21 PROCEDURE — 86709 HEPATITIS A IGM ANTIBODY: CPT | Performed by: NURSE PRACTITIONER

## 2017-11-21 PROCEDURE — 82150 ASSAY OF AMYLASE: CPT | Performed by: NURSE PRACTITIONER

## 2017-11-21 PROCEDURE — 74176 CT ABD & PELVIS W/O CONTRAST: CPT

## 2017-11-21 PROCEDURE — 86706 HEP B SURFACE ANTIBODY: CPT | Performed by: NURSE PRACTITIONER

## 2017-11-21 PROCEDURE — 80053 COMPREHEN METABOLIC PANEL: CPT | Performed by: NURSE PRACTITIONER

## 2017-11-21 NOTE — PROGRESS NOTES
Chief Complaint   Patient presents with   • Right upper abdominal pain ongoing       HPI  Gino Maguire is a 47 y.o. female  presents with complaint of RUQ abdominal pain since having gallbladder removed last year.  Intermittent pain described as dull aching; today it began intermittent sharp pains with persistent ache.  Has felt tired and lightheaded; one spot on right side that hurts very badly to touch.    Denies nausea/vomiting; heartburn, reflux, no dysuria, frequency, urgency, hematuria, fever    Past Medical History:   Diagnosis Date   • Abdominal pain    • Abdominal pain, RUQ     Check cmp. refer to [facility] u/s and gen surg eval d/t cholelithiasis noted on CT of abd/pelvis donein ER 1/16   • Acute bronchitis     Take cefdinir and medrol dose pack as directed. Use otc mucinex. Continue to rest and push fluids. Call or rtc if no better. Gave phenergan w/ codeine cough syrup 5 ml po prn #120ml, no RF per Dr. Cesar   • Acute pharyngitis     Check for monod/t exposure/ Pt will restart flovent as used for esophagitis. If no improvement take the medrol dose pack she was given at least visit   • Acute sinusitis    • Acute upper respiratory infection    • Body aches    • Cholelithiasis     Reviewed CT of adb/pelvis from ER 1/16 showed cholelithiasis. Will refer for gen surgery eval and GB u/s   • Chronic pain disorder    • Cough    • Eosinophilic esophagitis    • Extremity pain    • Gestational diabetes    • Headache    • Influenza    • Labyrinthitis    • Low back pain    • Lumbar strain    • Lumbosacral disc disease    • Neck pain    • Right hip pain    • Shingles    • Strain of thoracic region    • Viral infection    • Viral pharyngitis        Family History   Problem Relation Age of Onset   • Arthritis Mother    • Hypertension Mother    • Thyroid disease Mother    • Arthritis Father    • Hypertension Father    • Heart disease Father    • Heart attack Other    • Arthritis Other    • Hypertension Other    • Migraines  Other    • Stroke Other        Social History     Social History   • Marital status:      Spouse name: N/A   • Number of children: N/A   • Years of education: N/A     Occupational History   • Not on file.     Social History Main Topics   • Smoking status: Former Smoker   • Smokeless tobacco: Never Used   • Alcohol use No   • Drug use: No   • Sexual activity: Defer     Other Topics Concern   • Not on file     Social History Narrative       Review of Systems   Constitutional: Positive for fatigue. Negative for appetite change.   Respiratory: Negative for shortness of breath.    Gastrointestinal: Positive for abdominal distention and abdominal pain. Negative for blood in stool, constipation, diarrhea, nausea, rectal pain and vomiting.   Genitourinary: Positive for flank pain. Negative for difficulty urinating, dysuria, frequency, hematuria, pelvic pain, urgency, vaginal bleeding and vaginal discharge.   Neurological: Positive for light-headedness and headaches. Negative for dizziness.   All other systems reviewed and are negative.      /74  Pulse 69  Wt 189 lb (85.7 kg)  SpO2 98%  BMI 33.48 kg/m2    Physical Exam   Constitutional: She is oriented to person, place, and time. She appears well-developed and well-nourished.   HENT:   Head: Normocephalic and atraumatic.   Eyes: Conjunctivae are normal.   Neck: Normal range of motion. Neck supple.   Cardiovascular: Normal rate, regular rhythm, normal heart sounds and intact distal pulses.    No murmur heard.  No swelling in BLE     Pulmonary/Chest: Effort normal and breath sounds normal.   Abdominal: Soft. Normal appearance and bowel sounds are normal. There is tenderness in the right upper quadrant. There is positive Childress's sign. There is no rigidity, no rebound, no guarding, no CVA tenderness and no tenderness at McBurney's point. No hernia.   Neurological: She is alert and oriented to person, place, and time.   Skin: Skin is warm, dry and intact.    Vitals reviewed.    Brief Urine Lab Results  (Last result in the past 365 days)      Color   Clarity   Blood   Leuk Est   Nitrite   Protein   CREAT   Urine HCG        11/21/17 1411 Yellow Clear Negative Negative Negative Negative                 Assessment/Plan    1. Flank pain  - POCT urinalysis dipstick, automated  -no culture needed    2. Right upper quadrant abdominal pain  - CBC & Differential  - Comprehensive Metabolic Panel  - Lipase  - Amylase  - Hepatitis A Antibody, IgM  - Hepatitis C Antibody  - Hepatitis B Surface Antibody  - CT Abdomen Pelvis Without Contrast; Future  - CBC Auto Differential        F/U in 1-2 weeks if no significant findings on CT.    Patient verbalizes understanding of medication dosage, comfort measures, instructions for treatment and follow-up.    Lynnette Ruff, CLAY  11/21/2017  1:32 PM

## 2017-11-22 ENCOUNTER — TELEPHONE (OUTPATIENT)
Dept: INTERNAL MEDICINE | Facility: CLINIC | Age: 47
End: 2017-11-22

## 2017-11-22 NOTE — TELEPHONE ENCOUNTER
----- Message from CLAY Lawrence sent at 11/22/2017  4:20 PM EST -----  Can you let Ms. Maguire know that she has a cyst on her left ovary which is probably causing her pain and discomfort.  If the pain continues to worsen, she can go to the emergency room

## 2017-11-22 NOTE — TELEPHONE ENCOUNTER
Pt informed and stated verbal understanding. Pt said that pain is in her RUQ so doesn't understand how the cyst in left ovary is causing pain on the right side. She would also like to know if you have received lab work yet.

## 2017-11-23 NOTE — TELEPHONE ENCOUNTER
You can have referred pain from ovarian cysts.  The CT scan did not show any abnormality of the liver, spleen (left side), adrenals, or pancreas.  No kidney mass, stone, or obstruction.    Her lab studies were normal with a mild elevation of her white blood cell count.

## 2017-11-25 ENCOUNTER — TELEPHONE (OUTPATIENT)
Dept: INTERNAL MEDICINE | Facility: CLINIC | Age: 47
End: 2017-11-25

## 2017-11-25 DIAGNOSIS — R10.11 RIGHT UPPER QUADRANT ABDOMINAL PAIN: Primary | ICD-10-CM

## 2017-11-25 NOTE — TELEPHONE ENCOUNTER
She could come in on Monday for more labs; she may need to see GYN or schedule to see Myranda for a follow-up as well

## 2017-11-25 NOTE — TELEPHONE ENCOUNTER
Pt informed and stated verbal understanding. When would you like for her to come and get labs drawn again?

## 2017-11-25 NOTE — TELEPHONE ENCOUNTER
----- Message from CLAY Lawrence sent at 11/24/2017  2:16 PM EST -----  All lab studies are mostly WNL with the exception of 1 liver test that is slightly elevated--will continue to monitor and WBC which is elevated as well--could be sign of infection; stomach enzymes are WNL; hepatitis is all negative.  Occasionally ovarian cysts can cause mildly elevated WBC and some referred pain to the opposite side if there is air in the abdomen or the cyst is leaking.  Recommend appointment with GYN.

## 2017-12-03 ENCOUNTER — TELEPHONE (OUTPATIENT)
Dept: INTERNAL MEDICINE | Facility: CLINIC | Age: 47
End: 2017-12-03

## 2017-12-03 DIAGNOSIS — R53.83 FATIGUE, UNSPECIFIED TYPE: Primary | ICD-10-CM

## 2017-12-26 ENCOUNTER — TELEPHONE (OUTPATIENT)
Dept: INTERNAL MEDICINE | Facility: CLINIC | Age: 47
End: 2017-12-26

## 2018-01-09 ENCOUNTER — OFFICE VISIT (OUTPATIENT)
Dept: INTERNAL MEDICINE | Facility: CLINIC | Age: 48
End: 2018-01-09

## 2018-01-09 VITALS
WEIGHT: 187.4 LBS | OXYGEN SATURATION: 100 % | HEIGHT: 63 IN | HEART RATE: 77 BPM | SYSTOLIC BLOOD PRESSURE: 126 MMHG | DIASTOLIC BLOOD PRESSURE: 82 MMHG | BODY MASS INDEX: 33.2 KG/M2

## 2018-01-09 DIAGNOSIS — M79.674 GREAT TOE PAIN, RIGHT: Primary | ICD-10-CM

## 2018-01-09 DIAGNOSIS — R10.31 RLQ ABDOMINAL PAIN: ICD-10-CM

## 2018-01-09 DIAGNOSIS — M25.551 PAIN OF RIGHT HIP JOINT: ICD-10-CM

## 2018-01-09 DIAGNOSIS — M79.10 MYALGIA: ICD-10-CM

## 2018-01-09 PROCEDURE — 99214 OFFICE O/P EST MOD 30 MIN: CPT | Performed by: NURSE PRACTITIONER

## 2018-01-09 RX ORDER — ONDANSETRON 4 MG/1
4 TABLET, FILM COATED ORAL EVERY 8 HOURS PRN
Qty: 14 TABLET | Refills: 1 | Status: SHIPPED | OUTPATIENT
Start: 2018-01-09 | End: 2018-06-07 | Stop reason: SDUPTHER

## 2018-01-09 RX ORDER — ONDANSETRON 4 MG/1
4 TABLET, FILM COATED ORAL EVERY 8 HOURS PRN
Qty: 14 TABLET | Refills: 1 | Status: SHIPPED | OUTPATIENT
Start: 2018-01-09 | End: 2018-01-09 | Stop reason: SDUPTHER

## 2018-01-09 NOTE — PROGRESS NOTES
Subjective  Follow-up (right upper quadrant pain)      Gino Maguire is a 47 y.o. female.   Allergies   Allergen Reactions   • Labetalol    • Maxalt [Rizatriptan]    • Sulfa Antibiotics    • Sumatriptan    • Zolmitriptan      History of Present Illness      Right upper quad pain is intermittent, has been taking liver aid from the grocery and it does help  All of her testing has been normal but pt is sure it is her liver   right great toe pain x 6 mos, intermittent , worse when walking on it  Right hip continues to hurt , worse with walking, has been doing PT  X 6 mos , has helped a little , she does get injections from dr pierre with relief that is short term  Right hip pain  continuous places  on her body hurts to touch them, tender   The following portions of the patient's history were reviewed and updated as appropriate: current medications, past medical history, past social history, past surgical history and problem list.    Review of Systems   Gastrointestinal: Positive for abdominal pain.   Musculoskeletal: Positive for arthralgias, back pain and myalgias.   All other systems reviewed and are negative.      Objective   Physical Exam   Constitutional: She is oriented to person, place, and time. She appears well-developed and well-nourished.   HENT:   Head: Normocephalic and atraumatic.   Mouth/Throat: Oropharynx is clear and moist.   Eyes: Conjunctivae are normal.   Cardiovascular: Normal rate.    Pulmonary/Chest: Effort normal.   Abdominal: Soft. There is tenderness in the right lower quadrant. There is no rigidity, no rebound, no guarding and no CVA tenderness.   Musculoskeletal:   Tender various places when touched    Neurological: She is alert and oriented to person, place, and time.   Skin: Skin is warm and dry.   Psychiatric: She has a normal mood and affect. Her behavior is normal. Judgment and thought content normal.   Nursing note and vitals reviewed.      Assessment/Plan     Problem List Items  Addressed This Visit        Nervous and Auditory    Arthralgia of hip    Relevant Orders    Ambulatory Referral to Rheumatology    RLQ abdominal pain    Great toe pain, right - Primary    Relevant Orders    Uric acid    Myalgia    Relevant Orders    Ambulatory Referral to Rheumatology

## 2018-01-10 ENCOUNTER — TELEPHONE (OUTPATIENT)
Dept: ENDOCRINOLOGY | Facility: CLINIC | Age: 48
End: 2018-01-10

## 2018-01-25 ENCOUNTER — APPOINTMENT (OUTPATIENT)
Dept: MAMMOGRAPHY | Facility: HOSPITAL | Age: 48
End: 2018-01-25
Attending: OBSTETRICS & GYNECOLOGY

## 2018-02-11 RX ORDER — FLUTICASONE PROPIONATE 220 UG/1
AEROSOL, METERED RESPIRATORY (INHALATION)
Qty: 12 G | Refills: 2 | Status: SHIPPED | OUTPATIENT
Start: 2018-02-11 | End: 2018-05-08 | Stop reason: SDUPTHER

## 2018-03-13 ENCOUNTER — HOSPITAL ENCOUNTER (OUTPATIENT)
Dept: MAMMOGRAPHY | Facility: HOSPITAL | Age: 48
Discharge: HOME | End: 2018-03-13
Attending: OBSTETRICS & GYNECOLOGY | Admitting: OBSTETRICS & GYNECOLOGY

## 2018-03-13 ENCOUNTER — TRANSCRIBE ORDERS (OUTPATIENT)
Dept: MAMMOGRAPHY | Facility: HOSPITAL | Age: 48
End: 2018-03-13

## 2018-03-13 DIAGNOSIS — R92.8 ABNORMAL MAMMOGRAM: ICD-10-CM

## 2018-03-13 DIAGNOSIS — R92.8 ABNORMAL MAMMOGRAM: Primary | ICD-10-CM

## 2018-03-13 PROCEDURE — 77065 DX MAMMO INCL CAD UNI: CPT

## 2018-03-13 PROCEDURE — 77065 DX MAMMO INCL CAD UNI: CPT | Performed by: RADIOLOGY

## 2018-05-08 RX ORDER — FLUTICASONE PROPIONATE 220 UG/1
AEROSOL, METERED RESPIRATORY (INHALATION)
Qty: 12 G | Refills: 2 | Status: SHIPPED | OUTPATIENT
Start: 2018-05-08 | End: 2018-08-14 | Stop reason: SDUPTHER

## 2018-06-08 RX ORDER — ONDANSETRON 4 MG/1
TABLET, FILM COATED ORAL
Qty: 14 TABLET | Refills: 1 | Status: SHIPPED | OUTPATIENT
Start: 2018-06-08 | End: 2018-07-22 | Stop reason: SDUPTHER

## 2018-07-19 ENCOUNTER — TELEPHONE (OUTPATIENT)
Dept: URGENT CARE | Facility: CLINIC | Age: 48
End: 2018-07-19

## 2018-07-19 NOTE — TELEPHONE ENCOUNTER
Faxed referral request form to [facility] on [address] [place] [address] [place], [place] [address] Telephone [phone] Fax [phone]. [facility] stated they will contact Pt with scheduled referral appointment information and will also fax us the information. Pt informed regarding referral appointment verbalized understanding no further questions.

## 2018-07-20 ENCOUNTER — TELEPHONE (OUTPATIENT)
Dept: URGENT CARE | Facility: CLINIC | Age: 48
End: 2018-07-20

## 2018-07-20 NOTE — TELEPHONE ENCOUNTER
Faxed received from [facility], appointment made for 8/14/18 @ 9:00. They had contacted pt w/ appointment info.

## 2018-07-22 ENCOUNTER — OFFICE VISIT (OUTPATIENT)
Dept: INTERNAL MEDICINE | Facility: CLINIC | Age: 48
End: 2018-07-22

## 2018-07-22 VITALS
DIASTOLIC BLOOD PRESSURE: 74 MMHG | HEART RATE: 62 BPM | OXYGEN SATURATION: 99 % | HEIGHT: 63 IN | RESPIRATION RATE: 18 BRPM | SYSTOLIC BLOOD PRESSURE: 132 MMHG | BODY MASS INDEX: 31.01 KG/M2 | WEIGHT: 175 LBS

## 2018-07-22 DIAGNOSIS — B02.9 HERPES ZOSTER WITHOUT COMPLICATION: Primary | ICD-10-CM

## 2018-07-22 DIAGNOSIS — R51.9 SEVERE HEADACHE: ICD-10-CM

## 2018-07-22 PROCEDURE — 96372 THER/PROPH/DIAG INJ SC/IM: CPT | Performed by: NURSE PRACTITIONER

## 2018-07-22 PROCEDURE — 99213 OFFICE O/P EST LOW 20 MIN: CPT | Performed by: NURSE PRACTITIONER

## 2018-07-22 RX ORDER — VALACYCLOVIR HYDROCHLORIDE 1 G/1
1000 TABLET, FILM COATED ORAL 2 TIMES DAILY
Qty: 14 TABLET | Refills: 0 | Status: SHIPPED | OUTPATIENT
Start: 2018-07-22 | End: 2018-08-23

## 2018-07-22 RX ORDER — KETOROLAC TROMETHAMINE 30 MG/ML
60 INJECTION, SOLUTION INTRAMUSCULAR; INTRAVENOUS ONCE
Status: DISCONTINUED | OUTPATIENT
Start: 2018-07-22 | End: 2018-07-27

## 2018-07-22 RX ORDER — TRAMADOL HYDROCHLORIDE 50 MG/1
50 TABLET ORAL EVERY 8 HOURS PRN
Qty: 21 TABLET | Refills: 0 | OUTPATIENT
Start: 2018-07-22 | End: 2018-08-23

## 2018-07-22 RX ORDER — ONDANSETRON 4 MG/1
4 TABLET, FILM COATED ORAL EVERY 8 HOURS PRN
Qty: 14 TABLET | Refills: 1 | Status: SHIPPED | OUTPATIENT
Start: 2018-07-22 | End: 2018-10-14 | Stop reason: SDUPTHER

## 2018-07-22 RX ORDER — PROMETHAZINE HYDROCHLORIDE 25 MG/1
25 TABLET ORAL EVERY 8 HOURS PRN
Qty: 21 TABLET | Refills: 0 | Status: SHIPPED | OUTPATIENT
Start: 2018-07-22 | End: 2020-07-14 | Stop reason: SDUPTHER

## 2018-07-22 NOTE — PROGRESS NOTES
CHIEF COMPLAINT  Chief Complaint   Patient presents with   • Rash     Left side temple area, Pt stated that a friend gave her 3 doses of Valtrex and most of bumps are gone, still having headaches       HPI  Gino Maguire is a 48 y.o. female  presents with complaint of blistery rash on left side of forehead and temple area; headache and nausea    Started with severe h/a x 8 days, went to urgent care, Toradol 60 mg -w/o relief; rash began that evening; she took 3 doses of Valacyclovir 1000 mg--rash has improved, but h/a has not let up at all; this h/a is unlike her migraines--reports similar to pain experienced during last outbreak of shingles on left side of head.      Past Medical History:   Diagnosis Date   • Abdominal pain    • Abdominal pain, RUQ     Check cmp. refer to [facility] u/s and gen surg eval d/t cholelithiasis noted on CT of abd/pelvis donein ER 1/16   • Acute bronchitis     Take cefdinir and medrol dose pack as directed. Use otc mucinex. Continue to rest and push fluids. Call or rtc if no better. Gave phenergan w/ codeine cough syrup 5 ml po prn #120ml, no RF per Dr. Cesar   • Acute pharyngitis     Check for monod/t exposure/ Pt will restart flovent as used for esophagitis. If no improvement take the medrol dose pack she was given at least visit   • Acute sinusitis    • Acute upper respiratory infection    • Body aches    • Cholelithiasis     Reviewed CT of adb/pelvis from ER 1/16 showed cholelithiasis. Will refer for gen surgery eval and [facility] u/s   • Chronic pain disorder    • Cough    • Eosinophilic esophagitis    • Extremity pain    • Gestational diabetes    • Headache    • Influenza    • Labyrinthitis    • Low back pain    • Lumbar strain    • Lumbosacral disc disease    • Neck pain    • Right hip pain    • Shingles    • Strain of thoracic region    • Viral infection    • Viral pharyngitis        Family History   Problem Relation Age of Onset   • Arthritis Mother    • Hypertension Mother    • Thyroid  "disease Mother    • Arthritis Father    • Hypertension Father    • Heart disease Father    • Heart attack Other    • Arthritis Other    • Hypertension Other    • Migraines Other    • Stroke Other    • Breast cancer Other 31   • Endometrial cancer Neg Hx    • Ovarian cancer Neg Hx        Social History     Social History   • Marital status:      Spouse name: N/A   • Number of children: N/A   • Years of education: N/A     Occupational History   • Not on file.     Social History Main Topics   • Smoking status: Former Smoker   • Smokeless tobacco: Never Used   • Alcohol use No   • Drug use: No   • Sexual activity: Defer     Other Topics Concern   • Not on file     Social History Narrative   • No narrative on file       ROS  Review of Systems   Skin: Positive for rash.   Neurological: Positive for headaches.   All other systems reviewed and are negative.      /74   Pulse 62   Resp 18   Ht 160 cm (63\")   Wt 79.4 kg (175 lb)   SpO2 99%   Breastfeeding? No   BMI 31.00 kg/m²     PHYSICAL EXAM  Physical Exam   Constitutional: She appears well-developed and well-nourished.   HENT:   Head: Normocephalic and atraumatic.   Skin: Rash noted.   5-6 small healing vesicles located at left forehead at hairline; 1 vesicle above left eyebrow; 1 vesicle on left temple; mild erythema, TTP   Vitals reviewed.      ASSESSMENT/PLAN  1. Herpes zoster without complication  --see CHITRA BECERRA below  - traMADol (ULTRAM) 50 MG tablet; Take 1 tablet by mouth Every 8 (Eight) Hours As Needed for Moderate Pain .  Dispense: 21 tablet; Refill: 0    **Promethazine at night; Zofran during the day if needs to work  - promethazine (PHENERGAN) 25 MG tablet; Take 1 tablet by mouth Every 8 (Eight) Hours As Needed for Nausea or Vomiting.  Dispense: 21 tablet; Refill: 0  - ondansetron (ZOFRAN) 4 MG tablet; Take 1 tablet by mouth Every 8 (Eight) Hours As Needed for Nausea or Vomiting.  Dispense: 14 tablet; Refill: 1  **complete full course of " antiviral med  - valACYclovir (VALTREX) 1000 MG tablet; Take 1 tablet by mouth 2 (Two) Times a Day.  Dispense: 14 tablet; Refill: 0  **in office IM injection for pain  - ketorolac (TORADOL) injection 60 mg; Inject 60 mg into the appropriate muscle as directed by prescriber 1 (One) Time.  -if rash worsens or you experience other symptoms, fever, vision changes, weakness, etc.--RTC or go to ER    2. Severe headache  **in office IM injection to help with h/a pain  - ketorolac (TORADOL) injection 60 mg; Inject 60 mg into the appropriate muscle as directed by prescriber 1 (One) Time.    Judicious and appropriate use of tramadol.  CHITRA was reviewed and appropriate.  The patient has read and signed the [facility] Controlled Substance Contract 7/22/18. Patient has been counseled and is aware of side effects, risks, potential for addiction/tolerance, interactions, and how to take medication correctly.  Patient verbalizes understanding of contract and the importance of adhering to the contract signed today.      FOLLOW-UP  2 week(s) recheck    RTC sooner as needed.    Patient verbalizes understanding of medication dosage, comfort measures, instructions for treatment and follow-up.    Lynnette Ruff, CLAY  07/22/2018

## 2018-08-01 RX ORDER — KETOROLAC TROMETHAMINE 30 MG/ML
60 INJECTION, SOLUTION INTRAMUSCULAR; INTRAVENOUS ONCE
Status: COMPLETED | OUTPATIENT
Start: 2018-08-01 | End: 2018-08-07

## 2018-08-07 RX ADMIN — KETOROLAC TROMETHAMINE 60 MG: 30 INJECTION, SOLUTION INTRAMUSCULAR; INTRAVENOUS at 07:45

## 2018-08-14 ENCOUNTER — HOSPITAL ENCOUNTER (OUTPATIENT)
Dept: GENERAL RADIOLOGY | Facility: HOSPITAL | Age: 48
Discharge: HOME | End: 2018-08-14
Admitting: NURSE PRACTITIONER

## 2018-08-14 ENCOUNTER — TRANSCRIBE ORDERS (OUTPATIENT)
Dept: ADMINISTRATIVE | Facility: HOSPITAL | Age: 48
End: 2018-08-14

## 2018-08-14 ENCOUNTER — TRANSCRIBE ORDERS (OUTPATIENT)
Dept: LAB | Facility: HOSPITAL | Age: 48
End: 2018-08-14

## 2018-08-14 DIAGNOSIS — M62.81 MUSCLE WEAKNESS (GENERALIZED): ICD-10-CM

## 2018-08-14 DIAGNOSIS — M25.50 PAIN IN JOINT, MULTIPLE SITES: Primary | ICD-10-CM

## 2018-08-14 DIAGNOSIS — M54.2 CERVICALGIA: Primary | ICD-10-CM

## 2018-08-14 PROCEDURE — 72050 X-RAY EXAM NECK SPINE 4/5VWS: CPT

## 2018-08-14 RX ORDER — FLUTICASONE PROPIONATE 220 UG/1
AEROSOL, METERED RESPIRATORY (INHALATION)
Qty: 12 G | Refills: 2 | Status: SHIPPED | OUTPATIENT
Start: 2018-08-14 | End: 2018-11-26 | Stop reason: SDUPTHER

## 2018-08-23 ENCOUNTER — OFFICE VISIT (OUTPATIENT)
Dept: INTERNAL MEDICINE | Facility: CLINIC | Age: 48
End: 2018-08-23

## 2018-08-23 VITALS
HEIGHT: 63 IN | SYSTOLIC BLOOD PRESSURE: 124 MMHG | DIASTOLIC BLOOD PRESSURE: 76 MMHG | OXYGEN SATURATION: 98 % | WEIGHT: 176 LBS | BODY MASS INDEX: 31.18 KG/M2 | HEART RATE: 70 BPM

## 2018-08-23 DIAGNOSIS — R21 RASH AND OTHER NONSPECIFIC SKIN ERUPTION: ICD-10-CM

## 2018-08-23 DIAGNOSIS — K20.0 EOSINOPHILIC ESOPHAGITIS: Primary | ICD-10-CM

## 2018-08-23 PROCEDURE — 99213 OFFICE O/P EST LOW 20 MIN: CPT | Performed by: NURSE PRACTITIONER

## 2018-09-20 ENCOUNTER — HOSPITAL ENCOUNTER (OUTPATIENT)
Dept: MAMMOGRAPHY | Facility: HOSPITAL | Age: 48
Discharge: HOME | End: 2018-09-20
Attending: OBSTETRICS & GYNECOLOGY | Admitting: OBSTETRICS & GYNECOLOGY

## 2018-09-20 ENCOUNTER — TRANSCRIBE ORDERS (OUTPATIENT)
Dept: MAMMOGRAPHY | Facility: HOSPITAL | Age: 48
End: 2018-09-20

## 2018-09-20 ENCOUNTER — HOSPITAL ENCOUNTER (OUTPATIENT)
Dept: ULTRASOUND IMAGING | Facility: HOSPITAL | Age: 48
Discharge: HOME | End: 2018-09-20

## 2018-09-20 DIAGNOSIS — R92.8 ABNORMAL MAMMOGRAM: ICD-10-CM

## 2018-09-20 DIAGNOSIS — R92.8 ABNORMAL MAMMOGRAM: Primary | ICD-10-CM

## 2018-09-20 PROCEDURE — 76642 ULTRASOUND BREAST LIMITED: CPT

## 2018-09-20 PROCEDURE — 77066 DX MAMMO INCL CAD BI: CPT | Performed by: RADIOLOGY

## 2018-09-20 PROCEDURE — 77066 DX MAMMO INCL CAD BI: CPT

## 2018-09-20 PROCEDURE — 76642 ULTRASOUND BREAST LIMITED: CPT | Performed by: RADIOLOGY

## 2018-09-20 PROCEDURE — 77062 BREAST TOMOSYNTHESIS BI: CPT | Performed by: RADIOLOGY

## 2018-09-20 PROCEDURE — G0279 TOMOSYNTHESIS, MAMMO: HCPCS

## 2018-10-04 ENCOUNTER — HOSPITAL ENCOUNTER (OUTPATIENT)
Dept: MAMMOGRAPHY | Facility: HOSPITAL | Age: 48
Discharge: HOME | End: 2018-10-04

## 2018-10-04 ENCOUNTER — HOSPITAL ENCOUNTER (OUTPATIENT)
Dept: MAMMOGRAPHY | Facility: HOSPITAL | Age: 48
Discharge: HOME | End: 2018-10-04
Attending: OBSTETRICS & GYNECOLOGY | Admitting: RADIOLOGY

## 2018-10-04 DIAGNOSIS — R92.8 ABNORMAL MAMMOGRAM: ICD-10-CM

## 2018-10-04 PROCEDURE — 88305 TISSUE EXAM BY PATHOLOGIST: CPT | Performed by: OBSTETRICS & GYNECOLOGY

## 2018-10-04 PROCEDURE — 77065 DX MAMMO INCL CAD UNI: CPT | Performed by: RADIOLOGY

## 2018-10-04 PROCEDURE — A4648 IMPLANTABLE TISSUE MARKER: HCPCS

## 2018-10-04 PROCEDURE — 19081 BX BREAST 1ST LESION STRTCTC: CPT | Performed by: RADIOLOGY

## 2018-10-04 RX ORDER — LIDOCAINE HYDROCHLORIDE 10 MG/ML
10 INJECTION, SOLUTION INFILTRATION; PERINEURAL ONCE
Status: COMPLETED | OUTPATIENT
Start: 2018-10-04 | End: 2018-10-04

## 2018-10-04 RX ORDER — LIDOCAINE HYDROCHLORIDE AND EPINEPHRINE 10; 10 MG/ML; UG/ML
20 INJECTION, SOLUTION INFILTRATION; PERINEURAL ONCE
Status: COMPLETED | OUTPATIENT
Start: 2018-10-04 | End: 2018-10-04

## 2018-10-04 RX ADMIN — LIDOCAINE HYDROCHLORIDE 5 ML: 10 INJECTION, SOLUTION INFILTRATION; PERINEURAL at 12:28

## 2018-10-04 RX ADMIN — LIDOCAINE HYDROCHLORIDE AND EPINEPHRINE 10 ML: 10; 10 INJECTION, SOLUTION INFILTRATION; PERINEURAL at 12:28

## 2018-10-05 LAB
CYTO UR: NORMAL
LAB AP CASE REPORT: NORMAL
LAB AP CLINICAL INFORMATION: NORMAL
LAB AP DIAGNOSIS COMMENT: NORMAL
PATH REPORT.FINAL DX SPEC: NORMAL
PATH REPORT.GROSS SPEC: NORMAL

## 2018-10-08 ENCOUNTER — TELEPHONE (OUTPATIENT)
Dept: MAMMOGRAPHY | Facility: HOSPITAL | Age: 48
End: 2018-10-08

## 2018-10-14 DIAGNOSIS — B02.9 HERPES ZOSTER WITHOUT COMPLICATION: ICD-10-CM

## 2018-10-14 RX ORDER — ONDANSETRON 4 MG/1
TABLET, FILM COATED ORAL
Qty: 14 TABLET | Refills: 1 | Status: SHIPPED | OUTPATIENT
Start: 2018-10-14 | End: 2020-07-14

## 2018-11-19 ENCOUNTER — HOSPITAL ENCOUNTER (EMERGENCY)
Facility: HOSPITAL | Age: 48
Discharge: HOME | End: 2018-11-19
Attending: EMERGENCY MEDICINE | Admitting: EMERGENCY MEDICINE

## 2018-11-19 ENCOUNTER — APPOINTMENT (OUTPATIENT)
Dept: GENERAL RADIOLOGY | Facility: HOSPITAL | Age: 48
End: 2018-11-19

## 2018-11-19 VITALS
RESPIRATION RATE: 12 BRPM | HEIGHT: 63 IN | BODY MASS INDEX: 31.01 KG/M2 | DIASTOLIC BLOOD PRESSURE: 73 MMHG | HEART RATE: 61 BPM | TEMPERATURE: 97.5 F | WEIGHT: 175 LBS | OXYGEN SATURATION: 97 % | SYSTOLIC BLOOD PRESSURE: 113 MMHG

## 2018-11-19 DIAGNOSIS — R10.13 EPIGASTRIC PAIN: ICD-10-CM

## 2018-11-19 DIAGNOSIS — R07.9 CHEST PAIN, UNSPECIFIED TYPE: Primary | ICD-10-CM

## 2018-11-19 LAB
ALBUMIN SERPL-MCNC: 4.49 G/DL (ref 3.2–4.8)
ALBUMIN/GLOB SERPL: 1.7 G/DL (ref 1.5–2.5)
ALP SERPL-CCNC: 90 U/L (ref 25–100)
ALT SERPL W P-5'-P-CCNC: 26 U/L (ref 7–40)
AMPHET+METHAMPHET UR QL: NEGATIVE
AMPHETAMINES UR QL: NEGATIVE
ANION GAP SERPL CALCULATED.3IONS-SCNC: 8 MMOL/L (ref 3–11)
AST SERPL-CCNC: 25 U/L (ref 0–33)
BARBITURATES UR QL SCN: NEGATIVE
BASOPHILS # BLD AUTO: 0.03 10*3/MM3 (ref 0–0.2)
BASOPHILS NFR BLD AUTO: 0.3 % (ref 0–1)
BENZODIAZ UR QL SCN: POSITIVE
BILIRUB SERPL-MCNC: 0.5 MG/DL (ref 0.3–1.2)
BILIRUB UR QL STRIP: NEGATIVE
BUN BLD-MCNC: 21 MG/DL (ref 9–23)
BUN/CREAT SERPL: 26.6 (ref 7–25)
BUPRENORPHINE SERPL-MCNC: NEGATIVE NG/ML
CALCIUM SPEC-SCNC: 9.5 MG/DL (ref 8.7–10.4)
CANNABINOIDS SERPL QL: NEGATIVE
CHLORIDE SERPL-SCNC: 106 MMOL/L (ref 99–109)
CLARITY UR: CLEAR
CO2 SERPL-SCNC: 24 MMOL/L (ref 20–31)
COCAINE UR QL: NEGATIVE
COLOR UR: YELLOW
CREAT BLD-MCNC: 0.79 MG/DL (ref 0.6–1.3)
DEPRECATED RDW RBC AUTO: 46.1 FL (ref 37–54)
EOSINOPHIL # BLD AUTO: 0.46 10*3/MM3 (ref 0–0.3)
EOSINOPHIL NFR BLD AUTO: 4.5 % (ref 0–3)
ERYTHROCYTE [DISTWIDTH] IN BLOOD BY AUTOMATED COUNT: 13.2 % (ref 11.3–14.5)
GFR SERPL CREATININE-BSD FRML MDRD: 77 ML/MIN/1.73
GLOBULIN UR ELPH-MCNC: 2.6 GM/DL
GLUCOSE BLD-MCNC: 100 MG/DL (ref 70–100)
GLUCOSE UR STRIP-MCNC: NEGATIVE MG/DL
HCT VFR BLD AUTO: 42.2 % (ref 34.5–44)
HGB BLD-MCNC: 13.7 G/DL (ref 11.5–15.5)
HGB UR QL STRIP.AUTO: NEGATIVE
IMM GRANULOCYTES # BLD: 0.02 10*3/MM3 (ref 0–0.03)
IMM GRANULOCYTES NFR BLD: 0.2 % (ref 0–0.6)
KETONES UR QL STRIP: NEGATIVE
LEUKOCYTE ESTERASE UR QL STRIP.AUTO: NEGATIVE
LIPASE SERPL-CCNC: 32 U/L (ref 6–51)
LYMPHOCYTES # BLD AUTO: 2.4 10*3/MM3 (ref 0.6–4.8)
LYMPHOCYTES NFR BLD AUTO: 23.6 % (ref 24–44)
MCH RBC QN AUTO: 30.8 PG (ref 27–31)
MCHC RBC AUTO-ENTMCNC: 32.5 G/DL (ref 32–36)
MCV RBC AUTO: 94.8 FL (ref 80–99)
METHADONE UR QL SCN: NEGATIVE
MONOCYTES # BLD AUTO: 0.64 10*3/MM3 (ref 0–1)
MONOCYTES NFR BLD AUTO: 6.3 % (ref 0–12)
NEUTROPHILS # BLD AUTO: 6.66 10*3/MM3 (ref 1.5–8.3)
NEUTROPHILS NFR BLD AUTO: 65.3 % (ref 41–71)
NITRITE UR QL STRIP: NEGATIVE
OPIATES UR QL: POSITIVE
OXYCODONE UR QL SCN: NEGATIVE
PCP UR QL SCN: NEGATIVE
PH UR STRIP.AUTO: 6 [PH] (ref 5–8)
PLATELET # BLD AUTO: 302 10*3/MM3 (ref 150–450)
PMV BLD AUTO: 10.3 FL (ref 6–12)
POTASSIUM BLD-SCNC: 4.2 MMOL/L (ref 3.5–5.5)
PROPOXYPH UR QL: NEGATIVE
PROT SERPL-MCNC: 7.1 G/DL (ref 5.7–8.2)
PROT UR QL STRIP: NEGATIVE
RBC # BLD AUTO: 4.45 10*6/MM3 (ref 3.89–5.14)
SODIUM BLD-SCNC: 138 MMOL/L (ref 132–146)
SP GR UR STRIP: 1.02 (ref 1–1.03)
TRICYCLICS UR QL SCN: NEGATIVE
TROPONIN I SERPL-MCNC: <0.006 NG/ML
TROPONIN I SERPL-MCNC: <0.006 NG/ML
UROBILINOGEN UR QL STRIP: NORMAL
WBC NRBC COR # BLD: 10.19 10*3/MM3 (ref 3.5–10.8)

## 2018-11-19 PROCEDURE — 93005 ELECTROCARDIOGRAM TRACING: CPT

## 2018-11-19 PROCEDURE — 93005 ELECTROCARDIOGRAM TRACING: CPT | Performed by: PHYSICIAN ASSISTANT

## 2018-11-19 PROCEDURE — 85025 COMPLETE CBC W/AUTO DIFF WBC: CPT | Performed by: PHYSICIAN ASSISTANT

## 2018-11-19 PROCEDURE — 81003 URINALYSIS AUTO W/O SCOPE: CPT | Performed by: PHYSICIAN ASSISTANT

## 2018-11-19 PROCEDURE — 80053 COMPREHEN METABOLIC PANEL: CPT | Performed by: PHYSICIAN ASSISTANT

## 2018-11-19 PROCEDURE — 83690 ASSAY OF LIPASE: CPT | Performed by: PHYSICIAN ASSISTANT

## 2018-11-19 PROCEDURE — 84484 ASSAY OF TROPONIN QUANT: CPT | Performed by: EMERGENCY MEDICINE

## 2018-11-19 PROCEDURE — 80306 DRUG TEST PRSMV INSTRMNT: CPT | Performed by: PHYSICIAN ASSISTANT

## 2018-11-19 PROCEDURE — 99284 EMERGENCY DEPT VISIT MOD MDM: CPT

## 2018-11-19 PROCEDURE — 93005 ELECTROCARDIOGRAM TRACING: CPT | Performed by: EMERGENCY MEDICINE

## 2018-11-19 PROCEDURE — 71045 X-RAY EXAM CHEST 1 VIEW: CPT

## 2018-11-19 RX ORDER — SODIUM CHLORIDE 9 MG/ML
125 INJECTION, SOLUTION INTRAVENOUS CONTINUOUS
Status: DISCONTINUED | OUTPATIENT
Start: 2018-11-19 | End: 2018-11-19 | Stop reason: HOSPADM

## 2018-11-19 RX ORDER — MELOXICAM 15 MG/1
15 TABLET ORAL DAILY
COMMUNITY
End: 2019-07-18

## 2018-11-19 RX ORDER — SODIUM CHLORIDE 0.9 % (FLUSH) 0.9 %
10 SYRINGE (ML) INJECTION AS NEEDED
Status: DISCONTINUED | OUTPATIENT
Start: 2018-11-19 | End: 2018-11-19 | Stop reason: HOSPADM

## 2018-11-19 RX ORDER — PANTOPRAZOLE SODIUM 40 MG/1
40 TABLET, DELAYED RELEASE ORAL DAILY
Qty: 10 TABLET | Refills: 0 | Status: SHIPPED | OUTPATIENT
Start: 2018-11-19 | End: 2019-05-13

## 2018-11-19 NOTE — ED PROVIDER NOTES
Subjective   48-year-old female presents to emergency department with onset around 4 PM of mid epigastric pain radiating up into the mid sternal/left chest and into the left arm.  Patient states she took 3 baby aspirin and a half a Xanax.  She states she does have a history of eosinophilic esophagitis as well as a prior history of hiatal hernia.  States symptoms have been somewhat consistent with previous episodes of both, but at the same time the pain felt different.  No palpitations shortness of breath sputum hemoptysis.  No recent extended debility or long travel.  No prior history of DVT or PE.  She has no prior history of angina or coronary artery disease.  No prior history of hepatobiliary disease or pancreatitis.        Illness   Location:  Per HPI  Quality:  Per HPI  Severity:  Moderate  Onset quality:  Sudden  Duration:  12 hours  Timing:  Intermittent  Progression:  Waxing and waning  Chronicity:  New  Context:  Per HPi  Relieved by:  Per HPI  Worsened by:  Per HPI  Ineffective treatments:  Per HPI  Associated symptoms: abdominal pain and chest pain    Associated symptoms: no congestion, no cough, no diarrhea, no fever, no headaches, no nausea, no shortness of breath, no sore throat, no vomiting and no wheezing        Review of Systems   Constitutional: Negative for chills, diaphoresis and fever.   HENT: Negative for congestion and sore throat.    Respiratory: Negative for cough, choking, chest tightness, shortness of breath and wheezing.    Cardiovascular: Positive for chest pain. Negative for leg swelling.   Gastrointestinal: Positive for abdominal pain. Negative for abdominal distention, anal bleeding, blood in stool, constipation, diarrhea, nausea and vomiting.   Genitourinary: Negative for difficulty urinating, dysuria, flank pain, frequency, hematuria and urgency.   Musculoskeletal: Negative for neck stiffness.   Neurological: Negative for dizziness, seizures, syncope, speech difficulty, weakness,  light-headedness, numbness and headaches.   Psychiatric/Behavioral: Negative for confusion.   All other systems reviewed and are negative.      Past Medical History:   Diagnosis Date   • Abdominal pain    • Abdominal pain, RUQ     Check cmp. refer to [facility] u/[facility] and gen surg eval d/t cholelithiasis noted on CT of abd/pelvis donein ER    • Acute bronchitis     Take cefdinir and medrol dose pack as directed. Use otc mucinex. Continue to rest and push fluids. Call or rtc if no better. Gave phenergan w/ codeine cough syrup 5 ml po prn #120ml, no RF per Dr. Cesar   • Acute pharyngitis     Check for monod/t exposure/ Pt will restart flovent as used for esophagitis. If no improvement take the medrol dose pack she was given at least visit   • Acute sinusitis    • Acute upper respiratory infection    • Body aches    • Cholelithiasis     Reviewed CT of adb/pelvis from ER  showed cholelithiasis. Will refer for gen surgery eval and [facility] u/s   • Chronic pain disorder    • Cough    • Eosinophilic esophagitis    • Extremity pain    • Gestational diabetes    • Headache    • Influenza    • Labyrinthitis    • Low back pain    • Lumbar strain    • Lumbosacral disc disease    • Neck pain    • Right hip pain    • Shingles    • Strain of thoracic region    • Viral infection    • Viral pharyngitis        Allergies   Allergen Reactions   • Labetalol    • Maxalt [Rizatriptan]    • Sulfa Antibiotics    • Sumatriptan    • Zolmitriptan        Past Surgical History:   Procedure Laterality Date   •  SECTION      97, 00, 04, 12   • DILATATION AND CURETTAGE     • ENDOSCOPY     • OOPHORECTOMY Right 2017   • WISDOM TOOTH EXTRACTION         Family History   Problem Relation Age of Onset   • Arthritis Mother    • Hypertension Mother    • Thyroid disease Mother    • Arthritis Father    • Hypertension Father    • Heart disease Father    • Heart attack Other    • Arthritis Other    • Hypertension Other    • Migraines  Other    • Stroke Other    • Breast cancer Other 29   • Ovarian cancer Cousin 42   • Endometrial cancer Neg Hx        Social History     Socioeconomic History   • Marital status:      Spouse name: Not on file   • Number of children: Not on file   • Years of education: Not on file   • Highest education level: Not on file   Tobacco Use   • Smoking status: Former Smoker   • Smokeless tobacco: Never Used   Substance and Sexual Activity   • Alcohol use: No   • Drug use: No   • Sexual activity: Defer           Objective   Physical Exam   Constitutional: She is oriented to person, place, and time. She appears well-developed and well-nourished. No distress.   HENT:   Head: Normocephalic and atraumatic.   Right Ear: External ear normal.   Left Ear: External ear normal.   Nose: Nose normal.   Mouth/Throat: Oropharynx is clear and moist. No oropharyngeal exudate.   Eyes: Conjunctivae and EOM are normal. Pupils are equal, round, and reactive to light. Right eye exhibits no discharge. Left eye exhibits no discharge. No scleral icterus.   Neck: Normal range of motion. Neck supple. No JVD present. No tracheal deviation present. No thyromegaly present.   Cardiovascular: Normal rate, regular rhythm and normal heart sounds. Exam reveals no friction rub.   No murmur heard.  Pulmonary/Chest: Effort normal. No stridor. No respiratory distress. She has no wheezes. She has no rhonchi. She has no rales. She exhibits no tenderness.   Abdominal: Soft. Normal appearance, normal aorta and bowel sounds are normal. She exhibits no distension. There is no rebound, no guarding, no tenderness at McBurney's point and negative Childress's sign. No hernia.   Musculoskeletal: Normal range of motion. She exhibits no edema, tenderness or deformity.   Neurological: She is alert and oriented to person, place, and time. No cranial nerve deficit. She exhibits normal muscle tone. Coordination normal.   Skin: Skin is warm and dry. No rash noted. She is not  diaphoretic. No erythema. No pallor.   Psychiatric: She has a normal mood and affect. Her behavior is normal. Judgment and thought content normal.   Nursing note and vitals reviewed.      Procedures           ED Course      Recent Results (from the past 24 hour(s))   Comprehensive Metabolic Panel    Collection Time: 11/19/18  4:18 AM   Result Value Ref Range    Glucose 100 70 - 100 mg/dL    BUN 21 9 - 23 mg/dL    Creatinine 0.79 0.60 - 1.30 mg/dL    Sodium 138 132 - 146 mmol/L    Potassium 4.2 3.5 - 5.5 mmol/L    Chloride 106 99 - 109 mmol/L    CO2 24.0 20.0 - 31.0 mmol/L    Calcium 9.5 8.7 - 10.4 mg/dL    Total Protein 7.1 5.7 - 8.2 g/dL    Albumin 4.49 3.20 - 4.80 g/dL    ALT (SGPT) 26 7 - 40 U/L    AST (SGOT) 25 0 - 33 U/L    Alkaline Phosphatase 90 25 - 100 U/L    Total Bilirubin 0.5 0.3 - 1.2 mg/dL    eGFR Non African Amer 77 >60 mL/min/1.73    Globulin 2.6 gm/dL    A/G Ratio 1.7 1.5 - 2.5 g/dL    BUN/Creatinine Ratio 26.6 (H) 7.0 - 25.0    Anion Gap 8.0 3.0 - 11.0 mmol/L   Lipase    Collection Time: 11/19/18  4:18 AM   Result Value Ref Range    Lipase 32 6 - 51 U/L   CBC Auto Differential    Collection Time: 11/19/18  4:18 AM   Result Value Ref Range    WBC 10.19 3.50 - 10.80 10*3/mm3    RBC 4.45 3.89 - 5.14 10*6/mm3    Hemoglobin 13.7 11.5 - 15.5 g/dL    Hematocrit 42.2 34.5 - 44.0 %    MCV 94.8 80.0 - 99.0 fL    MCH 30.8 27.0 - 31.0 pg    MCHC 32.5 32.0 - 36.0 g/dL    RDW 13.2 11.3 - 14.5 %    RDW-SD 46.1 37.0 - 54.0 fl    MPV 10.3 6.0 - 12.0 fL    Platelets 302 150 - 450 10*3/mm3    Neutrophil % 65.3 41.0 - 71.0 %    Lymphocyte % 23.6 (L) 24.0 - 44.0 %    Monocyte % 6.3 0.0 - 12.0 %    Eosinophil % 4.5 (H) 0.0 - 3.0 %    Basophil % 0.3 0.0 - 1.0 %    Immature Grans % 0.2 0.0 - 0.6 %    Neutrophils, Absolute 6.66 1.50 - 8.30 10*3/mm3    Lymphocytes, Absolute 2.40 0.60 - 4.80 10*3/mm3    Monocytes, Absolute 0.64 0.00 - 1.00 10*3/mm3    Eosinophils, Absolute 0.46 (H) 0.00 - 0.30 10*3/mm3    Basophils, Absolute  0.03 0.00 - 0.20 10*3/mm3    Immature Grans, Absolute 0.02 0.00 - 0.03 10*3/mm3   Troponin    Collection Time: 11/19/18  4:18 AM   Result Value Ref Range    Troponin I <0.006 <=0.039 ng/mL   Urinalysis With Microscopic If Indicated (No Culture) - Urine, Clean Catch    Collection Time: 11/19/18  5:42 AM   Result Value Ref Range    Color, UA Yellow Yellow, Straw    Appearance, UA Clear Clear    pH, UA 6.0 5.0 - 8.0    Specific Gravity, UA 1.016 1.001 - 1.030    Glucose, UA Negative Negative    Ketones, UA Negative Negative    Bilirubin, UA Negative Negative    Blood, UA Negative Negative    Protein, UA Negative Negative    Leuk Esterase, UA Negative Negative    Nitrite, UA Negative Negative    Urobilinogen, UA 0.2 E.U./dL 0.2 - 1.0 E.U./dL   Urine Drug Screen - Urine, Clean Catch    Collection Time: 11/19/18  5:42 AM   Result Value Ref Range    THC, Screen, Urine Negative Negative    Phencyclidine (PCP), Urine Negative Negative    Cocaine Screen, Urine Negative Negative    Methamphetamine, Urine Negative Negative    Opiate Screen Positive (A) Negative    Amphetamine Screen, Urine Negative Negative    Benzodiazepine Screen, Urine Positive (A) Negative    Tricyclic Antidepressants Screen Negative Negative    Methadone Screen, Urine Negative Negative    Barbiturates Screen, Urine Negative Negative    Oxycodone Screen, Urine Negative Negative    Propoxyphene Screen Negative Negative    Buprenorphine, Screen, Urine Negative Negative   Troponin    Collection Time: 11/19/18  7:08 AM   Result Value Ref Range    Troponin I <0.006 <=0.039 ng/mL     Note: In addition to lab results from this visit, the labs listed above may include labs taken at another facility or during a different encounter within the last 24 hours. Please correlate lab times with ED admission and discharge times for further clarification of the services performed during this visit.    XR Chest 1 View   Final Result   No acute findings.       THIS DOCUMENT  HAS BEEN ELECTRONICALLY SIGNED BY TRENA EASTON MD        Vitals:    11/19/18 0600 11/19/18 0615 11/19/18 0717 11/19/18 0815   BP: 110/72 106/65 116/78 113/73   BP Location:       Patient Position:   Lying    Pulse:   61    Resp:   12    Temp:   97.5 °F (36.4 °C)    TempSrc:   Oral    SpO2: 97% 96% 95% 97%   Weight:       Height:         Medications - No data to display  ECG/EMG Results (last 24 hours)     Procedure Component Value Units Date/Time    ECG 12 Lead [[id number]] Collected:  11/19/18 0217     Updated:  11/19/18 0218        I reviewed results with pt and her family.  She feels much better at this time and is ready for discharge.  They will follow up at the chest pain clinic and GI for further evaluation and management.            MDM      Final diagnoses:   Chest pain, unspecified type   Epigastric pain            Roberto George PA-C  11/19/18 0541       Roberto George PA-C  11/19/18 0542       Roberto George PA-C  11/19/18 0612       Anand Lam DO  11/19/18 [id number]

## 2018-11-19 NOTE — DISCHARGE INSTRUCTIONS
Please call ASAP to arrange outpatient follow up with one of the following gastroenterologists:    Rahul Dominguez MD or Syed Shabazz MD  [address]  [place] [address] [phone]    Or    If unable to make a timely appointment with Dr. Dominguez or Dr. Shabazz, please follow up with one of these gastroenterologists:    Elliot Rabago MD or Rohit Chambers MD  [id number]  [place] [address] [phone]    If follow up with these specialists cannot be arranged soon, please also follow up with a primary care provider, next available.

## 2018-11-21 ENCOUNTER — HOSPITAL ENCOUNTER (OUTPATIENT)
Dept: CARDIOLOGY | Facility: HOSPITAL | Age: 48
Discharge: HOME | End: 2018-11-21
Attending: NURSE PRACTITIONER | Admitting: NURSE PRACTITIONER

## 2018-11-21 ENCOUNTER — OFFICE VISIT (OUTPATIENT)
Dept: CARDIOLOGY | Facility: HOSPITAL | Age: 48
End: 2018-11-21

## 2018-11-21 VITALS
HEIGHT: 63 IN | OXYGEN SATURATION: 97 % | RESPIRATION RATE: 16 BRPM | HEART RATE: 88 BPM | BODY MASS INDEX: 31.89 KG/M2 | TEMPERATURE: 98.2 F | WEIGHT: 180 LBS | DIASTOLIC BLOOD PRESSURE: 82 MMHG | SYSTOLIC BLOOD PRESSURE: 146 MMHG

## 2018-11-21 VITALS
DIASTOLIC BLOOD PRESSURE: 86 MMHG | HEART RATE: 83 BPM | WEIGHT: 179.9 LBS | SYSTOLIC BLOOD PRESSURE: 140 MMHG | HEIGHT: 63 IN | BODY MASS INDEX: 31.88 KG/M2

## 2018-11-21 DIAGNOSIS — R07.9 CHEST PAIN, UNSPECIFIED TYPE: ICD-10-CM

## 2018-11-21 DIAGNOSIS — I10 ESSENTIAL HYPERTENSION: ICD-10-CM

## 2018-11-21 DIAGNOSIS — R07.9 CHEST PAIN, UNSPECIFIED TYPE: Primary | ICD-10-CM

## 2018-11-21 DIAGNOSIS — K21.00 GASTROESOPHAGEAL REFLUX DISEASE WITH ESOPHAGITIS: ICD-10-CM

## 2018-11-21 LAB
BH CV STRESS BP STAGE 1: NORMAL
BH CV STRESS BP STAGE 2: NORMAL
BH CV STRESS DURATION MIN STAGE 1: 3
BH CV STRESS DURATION MIN STAGE 2: 3
BH CV STRESS DURATION MIN STAGE 3: 2
BH CV STRESS DURATION SEC STAGE 1: 0
BH CV STRESS DURATION SEC STAGE 2: 0
BH CV STRESS DURATION SEC STAGE 3: 0
BH CV STRESS GRADE STAGE 1: 10
BH CV STRESS GRADE STAGE 2: 12
BH CV STRESS GRADE STAGE 3: 14
BH CV STRESS HR STAGE 1: 125
BH CV STRESS HR STAGE 2: 150
BH CV STRESS HR STAGE 3: 169
BH CV STRESS METS STAGE 1: 5
BH CV STRESS METS STAGE 2: 7.5
BH CV STRESS METS STAGE 3: 10
BH CV STRESS O2 STAGE 1: 96
BH CV STRESS O2 STAGE 2: 98
BH CV STRESS O2 STAGE 3: 98
BH CV STRESS PROTOCOL 1: NORMAL
BH CV STRESS RECOVERY BP: NORMAL MMHG
BH CV STRESS RECOVERY HR: 113 BPM
BH CV STRESS SPEED STAGE 1: 1.7
BH CV STRESS SPEED STAGE 2: 2.5
BH CV STRESS SPEED STAGE 3: 3.4
BH CV STRESS STAGE 1: 1
BH CV STRESS STAGE 2: 2
BH CV STRESS STAGE 3: 3
MAXIMAL PREDICTED HEART RATE: 170 BPM
PERCENT MAX PREDICTED HR: 99.41 %
STRESS BASELINE BP: NORMAL MMHG
STRESS BASELINE HR: 83 BPM
STRESS PERCENT HR: 117 %
STRESS POST ESTIMATED WORKLOAD: 10.1 METS
STRESS POST EXERCISE DUR MIN: 8 MIN
STRESS POST EXERCISE DUR SEC: 0 SEC
STRESS POST O2 SAT PEAK: 98 %
STRESS POST PEAK BP: NORMAL MMHG
STRESS POST PEAK HR: 169 BPM
STRESS TARGET HR: 145 BPM

## 2018-11-21 PROCEDURE — 99214 OFFICE O/P EST MOD 30 MIN: CPT | Performed by: NURSE PRACTITIONER

## 2018-11-21 PROCEDURE — 93017 CV STRESS TEST TRACING ONLY: CPT

## 2018-11-21 PROCEDURE — 93018 CV STRESS TEST I&R ONLY: CPT | Performed by: INTERNAL MEDICINE

## 2018-11-21 RX ORDER — MULTIPLE VITAMINS W/ MINERALS TAB 9MG-400MCG
1 TAB ORAL DAILY
COMMUNITY
End: 2020-07-14 | Stop reason: SDUPTHER

## 2018-11-21 NOTE — PROGRESS NOTES
"[facility]      Encounter Date:11/21/2018     Gino Maguire  [address] [facility] [address]  [unfilled]    1970    No primary care provider on file.    Gino Maguire is a 48 y.o. female.      Subjective:     Chief Complaint:  Chest pain     HPI   Patient is a 47 YO F with past medical history significant for HTN, chronic pain who presents to the [facility] for evaluation of chest pain at the request of Dr. Lam. Patient presented to the ED on 11/19/18 with mid epigastric pain radiating to left chest and into the left arm. She does have a history of eosinophilic esophagitis and hiatal hernia. Work up in ED was unremarkable. Patient reports pain was constant for over hour and subsided after taking xanax and ASA. Pain exacerbated by laying down, not worse with activity. She has had similar pain in the past with GERD. She reports \"foamy stuff\" in her mouth days prior to symptoms.     Cardiac risk factors:  hypertension, Former tobacco use, sedentary lifestyle, obesity (BMI > 30), family history (father had heart disease in his 60s)    Patient Active Problem List   Diagnosis   • Right upper quadrant pain   • Anxiety   • Arthritis   • Axillary lymphadenopathy   • Chronic antral gastritis   • Eosinophilic esophagitis   • Hypertension   • Labyrinthitis   • Right-sided low back pain with right-sided sciatica   • Cyst of ovary   • Prolonged depressive adjustment reaction   • Arthralgia of hip   • Herpes zoster   • Strain of thoracic region   • Thrush, oral   • Grief reaction   • Eyelid gland swelling   • Gestational diabetes   • Pain of right heel   • Right flank pain   • Displacement of lumbar intervertebral disc   • Lumbar foraminal stenosis   • Greater trochanteric bursitis of both hips   • Bilateral primary osteoarthritis of knee   • Myofascial pain   • Physical deconditioning   • Insomnia   • Cervical spondylosis without myelopathy   • RLQ abdominal " pain   • Great toe pain, right   • Myalgia   • Rash and other nonspecific skin eruption       Past Medical History:   Diagnosis Date   • Abdominal pain    • Abdominal pain, RUQ     Check cmp. refer to [facility] u/s and gen surg eval d/t cholelithiasis noted on CT of abd/pelvis donein ER    • Acute bronchitis     Take cefdinir and medrol dose pack as directed. Use otc mucinex. Continue to rest and push fluids. Call or rtc if no better. Gave phenergan w/ codeine cough syrup 5 ml po prn #120ml, no RF per Dr. Cesar   • Acute pharyngitis     Check for monod/t exposure/ Pt will restart flovent as used for esophagitis. If no improvement take the medrol dose pack she was given at least visit   • Acute sinusitis    • Acute upper respiratory infection    • Body aches    • Cholelithiasis     Reviewed CT of adb/pelvis from ER  showed cholelithiasis. Will refer for gen surgery eval and GB u/s   • Chronic pain disorder    • Cough    • Eosinophilic esophagitis    • Extremity pain    • Gestational diabetes    • Headache    • Influenza    • Labyrinthitis    • Low back pain    • Lumbar strain    • Lumbosacral disc disease    • Neck pain    • Right hip pain    • Shingles    • Strain of thoracic region    • Viral infection    • Viral pharyngitis        Past Surgical History:   Procedure Laterality Date   •  SECTION      97, 00, 04, 12   • DILATATION AND CURETTAGE     • ENDOSCOPY     • OOPHORECTOMY Right 2017   • WISDOM TOOTH EXTRACTION         Family History   Problem Relation Age of Onset   • Arthritis Mother    • Hypertension Mother    • Thyroid disease Mother    • Arthritis Father    • Hypertension Father    • Heart disease Father    • Heart attack Other    • Arthritis Other    • Hypertension Other    • Migraines Other    • Stroke Other    • Breast cancer Other 31   • Ovarian cancer Cousin 42   • Heart failure Paternal Grandmother    • Endometrial cancer Neg Hx        Social History      Socioeconomic History   • Marital status:      Spouse name: Not on file   • Number of children: Not on file   • Years of education: Not on file   • Highest education level: Not on file   Social Needs   • Financial resource strain: Not on file   • Food insecurity - worry: Not on file   • Food insecurity - inability: Not on file   • Transportation needs - medical: Not on file   • Transportation needs - non-medical: Not on file   Occupational History   • Occupation:    Tobacco Use   • Smoking status: Former Smoker   • Smokeless tobacco: Never Used   Substance and Sexual Activity   • Alcohol use: No   • Drug use: No   • Sexual activity: Defer   Other Topics Concern   • Not on file   Social History Narrative    Caffeine: 2 serving per day.       Allergies   Allergen Reactions   • Labetalol    • Maxalt [Rizatriptan]    • Sulfa Antibiotics    • Sumatriptan    • Zolmitriptan          Current Outpatient Medications:   •  ALPRAZolam (XANAX) 0.5 MG tablet, One po bid prn, Disp: 60 tablet, Rfl: 0  •  buPROPion XL (WELLBUTRIN XL) 150 MG 24 hr tablet, Take 1 tablet by mouth Daily., Disp: , Rfl: 0  •  FLOVENT  MCG/ACT inhaler, inhale 2 puffs by mouth twice a day Rinse mouth after use, Disp: 12 g, Rfl: 2  •  L-Methylfolate-Algae (L-METHYLFOLATE FORTE) 15-90.314 MG capsule, Take 1 tablet by mouth Daily., Disp: , Rfl: 0  •  meloxicam (MOBIC) 15 MG tablet, Take 15 mg by mouth Daily., Disp: , Rfl:   •  metoclopramide (REGLAN) 10 MG tablet, Take  by mouth., Disp: , Rfl:   •  Multiple Vitamins-Minerals (MULTIVITAMIN WITH MINERALS) tablet tablet, Take 1 tablet by mouth Daily., Disp: , Rfl:   •  ondansetron (ZOFRAN) 4 MG tablet, take 1 tablet by mouth every 8 hours if needed for nausea and vomiting, Disp: 14 tablet, Rfl: 1  •  promethazine (PHENERGAN) 25 MG tablet, Take 1 tablet by mouth Every 8 (Eight) Hours As Needed for Nausea or Vomiting., Disp: 21 tablet, Rfl: 0  •  propranolol (INDERAL) 10 MG tablet, Take  "10 mg by mouth 2 (Two) Times a Day., Disp: , Rfl: 0  •  pantoprazole (PROTONIX) 40 MG EC tablet, Take 1 tablet by mouth Daily., Disp: 10 tablet, Rfl: 0    The following portions of the patient's history were reviewed and updated as appropriate: allergies, current medications, past family history, past medical history, past social history, past surgical history and problem list.    Review of Systems   Constitution: Negative for chills and fever.   Eyes: Negative.    Cardiovascular: Positive for chest pain and palpitations. Negative for claudication, cyanosis, dyspnea on exertion, irregular heartbeat, leg swelling, near-syncope, orthopnea, paroxysmal nocturnal dyspnea and syncope.   Respiratory: Negative for cough, shortness of breath and snoring.    Endocrine: Negative.    Hematologic/Lymphatic: Does not bruise/bleed easily.   Skin: Negative for poor wound healing.   Musculoskeletal: Negative.    Gastrointestinal: Positive for abdominal pain, dysphagia, heartburn and nausea. Negative for hematemesis, melena and vomiting.   Genitourinary: Negative.  Negative for hematuria.   Neurological: Positive for dizziness and headaches.   Psychiatric/Behavioral: The patient has insomnia and is nervous/anxious.    Allergic/Immunologic: Positive for environmental allergies.       Objective:     Vitals:    11/21/18 0836 11/21/18 0838 11/21/18 0839   BP: 138/81 135/77 146/82   BP Location: Right arm Left arm Left arm   Patient Position: Sitting Sitting Standing   Pulse: 82  88   Resp: 16     Temp: 98.2 °F (36.8 °C)     TempSrc: Temporal     SpO2: 97%     Weight: 81.6 kg (180 lb)     Height: 160 cm (63\")         Physical Exam   Constitutional: She is oriented to person, place, and time. She appears well-developed and well-nourished. No distress.   HENT:   Head: Normocephalic.   Eyes: Conjunctivae are normal. Pupils are equal, round, and reactive to light.   Neck: Neck supple. No JVD present. No thyromegaly present.   Cardiovascular: " Normal rate, regular rhythm, normal heart sounds and intact distal pulses. Exam reveals no gallop and no friction rub.   No murmur heard.  Pulmonary/Chest: Effort normal and breath sounds normal. No respiratory distress. She has no wheezes. She has no rales. She exhibits no tenderness.   Abdominal: Soft. Bowel sounds are normal.   Musculoskeletal: Normal range of motion. She exhibits no edema.   Neurological: She is alert and oriented to person, place, and time.   Skin: Skin is warm and dry.   Psychiatric: She has a normal mood and affect. Her behavior is normal. Thought content normal.   Vitals reviewed.      Lab and Diagnostic Review:  Results for orders placed or performed during the hospital encounter of 11/19/18   Comprehensive Metabolic Panel   Result Value Ref Range    Glucose 100 70 - 100 mg/dL    BUN 21 9 - 23 mg/dL    Creatinine 0.79 0.60 - 1.30 mg/dL    Sodium 138 132 - 146 mmol/L    Potassium 4.2 3.5 - 5.5 mmol/L    Chloride 106 99 - 109 mmol/L    CO2 24.0 20.0 - 31.0 mmol/L    Calcium 9.5 8.7 - 10.4 mg/dL    Total Protein 7.1 5.7 - 8.2 g/dL    Albumin 4.49 3.20 - 4.80 g/dL    ALT (SGPT) 26 7 - 40 U/L    AST (SGOT) 25 0 - 33 U/L    Alkaline Phosphatase 90 25 - 100 U/L    Total Bilirubin 0.5 0.3 - 1.2 mg/dL    eGFR Non African Amer 77 >60 mL/min/1.73    Globulin 2.6 gm/dL    A/G Ratio 1.7 1.5 - 2.5 g/dL    BUN/Creatinine Ratio 26.6 (H) 7.0 - 25.0    Anion Gap 8.0 3.0 - 11.0 mmol/L   Lipase   Result Value Ref Range    Lipase 32 6 - 51 U/L   Urinalysis With Microscopic If Indicated (No Culture) - Urine, Clean Catch   Result Value Ref Range    Color, UA Yellow Yellow, Straw    Appearance, UA Clear Clear    pH, UA 6.0 5.0 - 8.0    Specific Gravity, UA 1.016 1.001 - 1.030    Glucose, UA Negative Negative    Ketones, UA Negative Negative    Bilirubin, UA Negative Negative    Blood, UA Negative Negative    Protein, UA Negative Negative    Leuk Esterase, UA Negative Negative    Nitrite, UA Negative Negative     Urobilinogen, UA 0.2 E.U./dL 0.2 - 1.0 E.U./dL   Urine Drug Screen - Urine, Clean Catch   Result Value Ref Range    THC, Screen, Urine Negative Negative    Phencyclidine (PCP), Urine Negative Negative    Cocaine Screen, Urine Negative Negative    Methamphetamine, Urine Negative Negative    Opiate Screen Positive (A) Negative    Amphetamine Screen, Urine Negative Negative    Benzodiazepine Screen, Urine Positive (A) Negative    Tricyclic Antidepressants Screen Negative Negative    Methadone Screen, Urine Negative Negative    Barbiturates Screen, Urine Negative Negative    Oxycodone Screen, Urine Negative Negative    Propoxyphene Screen Negative Negative    Buprenorphine, Screen, Urine Negative Negative   CBC Auto Differential   Result Value Ref Range    WBC 10.19 3.50 - 10.80 10*3/mm3    RBC 4.45 3.89 - 5.14 10*6/mm3    Hemoglobin 13.7 11.5 - 15.5 g/dL    Hematocrit 42.2 34.5 - 44.0 %    MCV 94.8 80.0 - 99.0 fL    MCH 30.8 27.0 - 31.0 pg    MCHC 32.5 32.0 - 36.0 g/dL    RDW 13.2 11.3 - 14.5 %    RDW-SD 46.1 37.0 - 54.0 fl    MPV 10.3 6.0 - 12.0 fL    Platelets 302 150 - 450 10*3/mm3    Neutrophil % 65.3 41.0 - 71.0 %    Lymphocyte % 23.6 (L) 24.0 - 44.0 %    Monocyte % 6.3 0.0 - 12.0 %    Eosinophil % 4.5 (H) 0.0 - 3.0 %    Basophil % 0.3 0.0 - 1.0 %    Immature Grans % 0.2 0.0 - 0.6 %    Neutrophils, Absolute 6.66 1.50 - 8.30 10*3/mm3    Lymphocytes, Absolute 2.40 0.60 - 4.80 10*3/mm3    Monocytes, Absolute 0.64 0.00 - 1.00 10*3/mm3    Eosinophils, Absolute 0.46 (H) 0.00 - 0.30 10*3/mm3    Basophils, Absolute 0.03 0.00 - 0.20 10*3/mm3    Immature Grans, Absolute 0.02 0.00 - 0.03 10*3/mm3   Troponin   Result Value Ref Range    Troponin I <0.006 <=0.039 ng/mL   Troponin   Result Value Ref Range    Troponin I <0.006 <=0.039 ng/mL     EKG: NSR    Assessment and Plan:   1. Chest pain, unspecified type  DENNY risk score 1 (ASA use)  - Treadmill Stress Test; Future  - Adult Transthoracic Echo Complete W/ Cont if Necessary  Per Protocol; Future    2. Essential hypertension  - Controlled. History of pre-eclampsia. Needs echo to rule out structural heart disease  - Treadmill Stress Test; Future  - Adult Transthoracic Echo Complete W/ Cont if Necessary Per Protocol; Future    3. Gastroesophageal reflux disease with esophagitis  - Chronic, on PPE      Further plans pending testing results      It has been a pleasure to participate in the care of this patient.  Patient was instructed to call the [facility] with any questions, concerns, or worsening symptoms.    *Please note that portions of this note were completed with a voice recognition program. Efforts were made to edit the dictations, but occasionally words are mistranscribed.

## 2018-11-23 ENCOUNTER — HOSPITAL ENCOUNTER (OUTPATIENT)
Dept: CARDIOLOGY | Facility: HOSPITAL | Age: 48
Discharge: HOME | End: 2018-11-23
Attending: NURSE PRACTITIONER | Admitting: NURSE PRACTITIONER

## 2018-11-23 VITALS — BODY MASS INDEX: 31.01 KG/M2 | HEIGHT: 63 IN | WEIGHT: 175 LBS

## 2018-11-23 DIAGNOSIS — R07.9 CHEST PAIN, UNSPECIFIED TYPE: ICD-10-CM

## 2018-11-23 DIAGNOSIS — I10 ESSENTIAL HYPERTENSION: ICD-10-CM

## 2018-11-23 LAB
BH CV ECHO MEAS - AO ROOT AREA (BSA CORRECTED): 1.4
BH CV ECHO MEAS - AO ROOT AREA: 4.9 CM^2
BH CV ECHO MEAS - AO ROOT DIAM: 2.5 CM
BH CV ECHO MEAS - BSA(HAYCOCK): 1.9 M^2
BH CV ECHO MEAS - BSA: 1.8 M^2
BH CV ECHO MEAS - BZI_BMI: 31 KILOGRAMS/M^2
BH CV ECHO MEAS - BZI_METRIC_HEIGHT: 160 CM
BH CV ECHO MEAS - BZI_METRIC_WEIGHT: 79.4 KG
BH CV ECHO MEAS - EDV(CUBED): 48.6 ML
BH CV ECHO MEAS - EDV(MOD-SP2): 36 ML
BH CV ECHO MEAS - EDV(MOD-SP4): 48 ML
BH CV ECHO MEAS - EDV(TEICH): 56.3 ML
BH CV ECHO MEAS - EF(CUBED): 75.6 %
BH CV ECHO MEAS - EF(MOD-BP): 65 %
BH CV ECHO MEAS - EF(MOD-SP2): 66.7 %
BH CV ECHO MEAS - EF(MOD-SP4): 62.5 %
BH CV ECHO MEAS - EF(TEICH): 68.5 %
BH CV ECHO MEAS - ESV(CUBED): 11.9 ML
BH CV ECHO MEAS - ESV(MOD-SP2): 12 ML
BH CV ECHO MEAS - ESV(MOD-SP4): 18 ML
BH CV ECHO MEAS - ESV(TEICH): 17.7 ML
BH CV ECHO MEAS - FS: 37.5 %
BH CV ECHO MEAS - IVS/LVPW: 1
BH CV ECHO MEAS - IVSD: 0.93 CM
BH CV ECHO MEAS - LA DIMENSION: 3.3 CM
BH CV ECHO MEAS - LA/AO: 1.3
BH CV ECHO MEAS - LAD MAJOR: 5.7 CM
BH CV ECHO MEAS - LAT PEAK E' VEL: 13.4 CM/SEC
BH CV ECHO MEAS - LATERAL E/E' RATIO: 6
BH CV ECHO MEAS - LV DIASTOLIC VOL/BSA (35-75): 26.3 ML/M^2
BH CV ECHO MEAS - LV MASS(C)D: 97 GRAMS
BH CV ECHO MEAS - LV MASS(C)DI: 53.1 GRAMS/M^2
BH CV ECHO MEAS - LV SYSTOLIC VOL/BSA (12-30): 9.9 ML/M^2
BH CV ECHO MEAS - LVIDD: 3.6 CM
BH CV ECHO MEAS - LVIDS: 2.3 CM
BH CV ECHO MEAS - LVLD AP2: 6.2 CM
BH CV ECHO MEAS - LVLD AP4: 6.8 CM
BH CV ECHO MEAS - LVLS AP2: 5.8 CM
BH CV ECHO MEAS - LVLS AP4: 6 CM
BH CV ECHO MEAS - LVPWD: 0.89 CM
BH CV ECHO MEAS - MED PEAK E' VEL: 9.4 CM/SEC
BH CV ECHO MEAS - MEDIAL E/E' RATIO: 8.6
BH CV ECHO MEAS - MV A MAX VEL: 77 CM/SEC
BH CV ECHO MEAS - MV DEC SLOPE: 304.2 CM/SEC^2
BH CV ECHO MEAS - MV DEC TIME: 0.18 SEC
BH CV ECHO MEAS - MV E MAX VEL: 81.9 CM/SEC
BH CV ECHO MEAS - MV E/A: 1.1
BH CV ECHO MEAS - PA ACC SLOPE: 586.9 CM/SEC^2
BH CV ECHO MEAS - PA ACC TIME: 0.13 SEC
BH CV ECHO MEAS - PA PR(ACCEL): 19 MMHG
BH CV ECHO MEAS - RAP SYSTOLE: 5 MMHG
BH CV ECHO MEAS - RVDD: 2.4 CM
BH CV ECHO MEAS - RVSP: 21 MMHG
BH CV ECHO MEAS - SI(CUBED): 20.1 ML/M^2
BH CV ECHO MEAS - SI(MOD-SP2): 13.1 ML/M^2
BH CV ECHO MEAS - SI(MOD-SP4): 16.4 ML/M^2
BH CV ECHO MEAS - SI(TEICH): 21.1 ML/M^2
BH CV ECHO MEAS - SV(CUBED): 36.8 ML
BH CV ECHO MEAS - SV(MOD-SP2): 24 ML
BH CV ECHO MEAS - SV(MOD-SP4): 30 ML
BH CV ECHO MEAS - SV(TEICH): 38.5 ML
BH CV ECHO MEAS - TAPSE (>1.6): 2.2 CM2
BH CV ECHO MEAS - TR MAX PG: 16 MMHG
BH CV ECHO MEAS - TR MAX VEL: 198.4 CM/SEC
BH CV ECHO MEASUREMENTS AVERAGE E/E' RATIO: 7.18
BH CV XLRA - RV BASE: 2.5 CM
BH CV XLRA - RV LENGTH: 6 CM
BH CV XLRA - RV MID: 2.1 CM
BH CV XLRA - TDI S': 13.5 CM/SEC
LV EF 2D ECHO EST: 60 %
MAXIMAL PREDICTED HEART RATE: 170 BPM
STRESS TARGET HR: 145 BPM

## 2018-11-23 PROCEDURE — 93306 TTE W/DOPPLER COMPLETE: CPT | Performed by: INTERNAL MEDICINE

## 2018-11-23 PROCEDURE — 93306 TTE W/DOPPLER COMPLETE: CPT

## 2018-11-26 ENCOUNTER — OFFICE VISIT (OUTPATIENT)
Dept: INTERNAL MEDICINE | Facility: CLINIC | Age: 48
End: 2018-11-26

## 2018-11-26 VITALS
DIASTOLIC BLOOD PRESSURE: 86 MMHG | OXYGEN SATURATION: 98 % | WEIGHT: 175 LBS | BODY MASS INDEX: 31.01 KG/M2 | HEART RATE: 64 BPM | SYSTOLIC BLOOD PRESSURE: 132 MMHG | HEIGHT: 63 IN | TEMPERATURE: 98 F

## 2018-11-26 DIAGNOSIS — M54.41 ACUTE RIGHT-SIDED LOW BACK PAIN WITH RIGHT-SIDED SCIATICA: Primary | ICD-10-CM

## 2018-11-26 PROCEDURE — 96372 THER/PROPH/DIAG INJ SC/IM: CPT | Performed by: NURSE PRACTITIONER

## 2018-11-26 PROCEDURE — 99214 OFFICE O/P EST MOD 30 MIN: CPT | Performed by: NURSE PRACTITIONER

## 2018-11-26 RX ORDER — TIZANIDINE 4 MG/1
4 TABLET ORAL NIGHTLY PRN
COMMUNITY
End: 2019-07-18

## 2018-11-26 RX ORDER — METHYLPREDNISOLONE ACETATE 40 MG/ML
40 INJECTION, SUSPENSION INTRA-ARTICULAR; INTRALESIONAL; INTRAMUSCULAR; SOFT TISSUE ONCE
Status: COMPLETED | OUTPATIENT
Start: 2018-11-26 | End: 2018-11-26

## 2018-11-26 RX ORDER — FLUTICASONE PROPIONATE 220 UG/1
AEROSOL, METERED RESPIRATORY (INHALATION)
Qty: 12 G | Refills: 2 | Status: SHIPPED | OUTPATIENT
Start: 2018-11-26 | End: 2019-05-13

## 2018-11-26 RX ORDER — KETOROLAC TROMETHAMINE 30 MG/ML
30 INJECTION, SOLUTION INTRAMUSCULAR; INTRAVENOUS ONCE
Status: COMPLETED | OUTPATIENT
Start: 2018-11-26 | End: 2018-11-26

## 2018-11-26 RX ADMIN — KETOROLAC TROMETHAMINE 30 MG: 30 INJECTION, SOLUTION INTRAMUSCULAR; INTRAVENOUS at 13:59

## 2018-11-26 RX ADMIN — METHYLPREDNISOLONE ACETATE 40 MG: 40 INJECTION, SUSPENSION INTRA-ARTICULAR; INTRALESIONAL; INTRAMUSCULAR; SOFT TISSUE at 14:00

## 2018-11-26 NOTE — PROGRESS NOTES
Chief Complaint   Patient presents with   • Flank Pain     Right side       History of Present Illness  48 y.o.female presents for right lower back pain.    Right low back pain that radiates down right buttocks and down right leg; sharp pain; started Friday.  On feet alot thursday.  Interferes with activities and sleep; worse with sitting and lying down.  Hx of back pain and disc problems.  No fever. No numbness tingling; no urinary incontinence or saddle anesthesia.  No dysuria      Review of Systems   Constitutional: Negative for chills and fever.   Genitourinary: Negative for urinary incontinence, difficulty urinating, dysuria and frequency.   Musculoskeletal: Positive for back pain.   Neurological: Negative for weakness and numbness.         [facility]  The following portions of the patient's history were reviewed and updated as appropriate: allergies, current medications, past family history, past medical history, past social history, past surgical history and problem list.     Past Medical History:   Diagnosis Date   • Abdominal pain    • Abdominal pain, RUQ     Check cmp. refer to [facility] u/s and gen surg eval d/t cholelithiasis noted on CT of abd/pelvis donein ER 1/16   • Acute bronchitis     Take cefdinir and medrol dose pack as directed. Use otc mucinex. Continue to rest and push fluids. Call or rtc if no better. Gave phenergan w/ codeine cough syrup 5 ml po prn #120ml, no RF per Dr. Cesar   • Acute pharyngitis     Check for monod/t exposure/ Pt will restart flovent as used for esophagitis. If no improvement take the medrol dose pack she was given at least visit   • Acute sinusitis    • Acute upper respiratory infection    • Body aches    • Cholelithiasis     Reviewed CT of adb/pelvis from ER 1/16 showed cholelithiasis. Will refer for gen surgery eval and GB u/s   • Chronic pain disorder    • Cough    • Eosinophilic esophagitis    • Extremity pain    • Gestational diabetes    • Headache    • Influenza    •  Labyrinthitis    • Low back pain    • Lumbar strain    • Lumbosacral disc disease    • Neck pain    • Right hip pain    • Shingles    • Strain of thoracic region    • Viral infection    • Viral pharyngitis       Past Surgical History:   Procedure Laterality Date   •  SECTION      97, 00, 04, 12   • DILATATION AND CURETTAGE     • ENDOSCOPY     • OOPHORECTOMY Right 2017   • WISDOM TOOTH EXTRACTION        Allergies   Allergen Reactions   • Maxalt [Rizatriptan] Anaphylaxis   • Sumatriptan Anaphylaxis   • Zolmitriptan Anaphylaxis   • Labetalol Rash   • Sulfa Antibiotics Rash      Family History   Problem Relation Age of Onset   • Arthritis Mother    • Hypertension Mother    • Thyroid disease Mother    • Arthritis Father    • Hypertension Father    • Heart disease Father    • Heart attack Other    • Arthritis Other    • Hypertension Other    • Migraines Other    • Stroke Other    • Breast cancer Other 31   • Ovarian cancer Cousin 42   • Heart failure Paternal Grandmother    • Endometrial cancer Neg Hx       Social History     Socioeconomic History   • Marital status:      Spouse name: Not on file   • Number of children: Not on file   • Years of education: Not on file   • Highest education level: Not on file   Social Needs   • Financial resource strain: Not on file   • Food insecurity - worry: Not on file   • Food insecurity - inability: Not on file   • Transportation needs - medical: Not on file   • Transportation needs - non-medical: Not on file   Occupational History   • Occupation:    Tobacco Use   • Smoking status: Former Smoker   • Smokeless tobacco: Never Used   Substance and Sexual Activity   • Alcohol use: No   • Drug use: No   • Sexual activity: Defer   Other Topics Concern   • Not on file   Social History Narrative    Caffeine: 2 serving per day.         Current Outpatient Medications:   •  ALPRAZolam (XANAX) 0.5 MG tablet, One po bid prn, Disp: 60 tablet, Rfl:  "0  •  buPROPion XL (WELLBUTRIN XL) 150 MG 24 hr tablet, Take 1 tablet by mouth Daily., Disp: , Rfl: 0  •  FLOVENT  MCG/ACT inhaler, inhale 2 puffs by mouth twice a day Rinse mouth after use, Disp: 12 g, Rfl: 2  •  L-Methylfolate-Algae (L-METHYLFOLATE FORTE) 15-90.314 MG capsule, Take 1 tablet by mouth Daily., Disp: , Rfl: 0  •  meloxicam (MOBIC) 15 MG tablet, Take 15 mg by mouth Daily., Disp: , Rfl:   •  metoclopramide (REGLAN) 10 MG tablet, Take  by mouth., Disp: , Rfl:   •  Multiple Vitamins-Minerals (MULTIVITAMIN WITH MINERALS) tablet tablet, Take 1 tablet by mouth Daily., Disp: , Rfl:   •  ondansetron (ZOFRAN) 4 MG tablet, take 1 tablet by mouth every 8 hours if needed for nausea and vomiting, Disp: 14 tablet, Rfl: 1  •  pantoprazole (PROTONIX) 40 MG EC tablet, Take 1 tablet by mouth Daily., Disp: 10 tablet, Rfl: 0  •  promethazine (PHENERGAN) 25 MG tablet, Take 1 tablet by mouth Every 8 (Eight) Hours As Needed for Nausea or Vomiting., Disp: 21 tablet, Rfl: 0  •  propranolol (INDERAL) 10 MG tablet, Take 10 mg by mouth 2 (Two) Times a Day., Disp: , Rfl: 0  •  tiZANidine (ZANAFLEX) 4 MG tablet, Take 4 mg by mouth At Night As Needed for Muscle Spasms., Disp: , Rfl:     VITALS:  /86   Pulse 64   Temp 98 °F (36.7 °C)   Ht 160 cm (63\")   Wt 79.4 kg (175 lb)   SpO2 98%   Breastfeeding? No   BMI 31.00 kg/m²     Physical Exam   Constitutional: She is oriented to person, place, and time. She appears well-developed and well-nourished. No distress.   HENT:   Head: Normocephalic.   Eyes: EOM are normal. Pupils are equal, round, and reactive to light.   Neck: Normal range of motion. Neck supple.   Cardiovascular: Normal rate, regular rhythm and normal heart sounds.   Pulmonary/Chest: Effort normal and breath sounds normal. No respiratory distress.   Musculoskeletal:        Lumbar back: She exhibits tenderness, bony tenderness and pain. She exhibits no swelling, no edema and no spasm.   Slow to rise from " sitting position; positive right straight leg raise.  Limited ROM rotation and flexion of low back.   Neurological: She is alert and oriented to person, place, and time. She displays normal reflexes.   Skin: Skin is warm and dry. Capillary refill takes less than 2 seconds. No rash noted.   Psychiatric: She has a normal mood and affect. Her behavior is normal.       LABS  No new labs    ASSESSMENT/PLAN  Gino was seen today for flank pain.    Diagnoses and all orders for this visit:    Acute right-sided low back pain with right-sided sciatica  -     methylPREDNISolone acetate (DEPO-medrol) injection 40 mg; Inject 1 mL into the appropriate muscle as directed by prescriber 1 (One) Time.  -     ketorolac (TORADOL) injection 30 mg; Inject 1 mL into the appropriate muscle as directed by prescriber 1 (One) Time.  -     Ambulatory Referral to Physical Therapy Evaluate and treat (low back pain, right sciatica)    already has prescription for muscle relaxer and NSAID. Will try physical therapy to help speed recovery.    I discussed the patients findings and my recommendations with patient.     Patient was encouraged to keep me informed of any acute changes, lack of improvement, or any new concerning symptoms.    Patient voiced understanding of all instructions and denied further questions.      FOLLOW-UP  Return if symptoms worsen or fail to improve.    Electronically signed by:    CLAY Bagley  11/26/2018

## 2018-11-28 ENCOUNTER — TELEPHONE (OUTPATIENT)
Dept: INTERNAL MEDICINE | Facility: CLINIC | Age: 48
End: 2018-11-28

## 2018-11-28 DIAGNOSIS — M54.50 ACUTE MIDLINE LOW BACK PAIN WITHOUT SCIATICA: Primary | ICD-10-CM

## 2018-11-28 NOTE — TELEPHONE ENCOUNTER
PATIENT SAID SHE HAD A STEROID INJECTION ON 11/26 AND WAS TOLD TO CALL BACK IF IT WASN'T ANY BETTER IN A COUPLE DAYS.  SHE STATES THAT HER BACK ISNT ANY BETTER.  SHE STARTS PT TOMORROW.  SHE SAID A PREP PACK WAS MENTIONED BUT SHE SAID SHE WOULD CALL AND FIND OUT.  HER [facility]ARM[facility]Y IS [facility] [address] TELLES HER PHONE NUMBER [phone]

## 2018-11-29 ENCOUNTER — HOSPITAL ENCOUNTER (OUTPATIENT)
Dept: PHYSICAL THERAPY | Facility: HOSPITAL | Age: 48
Setting detail: THERAPIES SERIES
Discharge: HOME | End: 2018-11-29

## 2018-11-29 DIAGNOSIS — M54.41 ACUTE RIGHT-SIDED LOW BACK PAIN WITH RIGHT-SIDED SCIATICA: Primary | ICD-10-CM

## 2018-11-29 PROCEDURE — 97161 PT EVAL LOW COMPLEX 20 MIN: CPT

## 2018-11-29 NOTE — THERAPY TREATMENT NOTE
Outpatient Physical Therapy Ortho Initial Evaluation   [facility]     Patient Name: Gino Maguire  : 1970  MRN: [id number]  Today's Date: 2018      Visit Date: 2018    Patient Active Problem List   Diagnosis   • Right upper quadrant pain   • Anxiety   • Arthritis   • Axillary lymphadenopathy   • Chronic antral gastritis   • Eosinophilic esophagitis   • Hypertension   • Labyrinthitis   • Right-sided low back pain with right-sided sciatica   • Cyst of ovary   • Prolonged depressive adjustment reaction   • Arthralgia of hip   • Herpes zoster   • Strain of thoracic region   • Thrush, oral   • Grief reaction   • Eyelid gland swelling   • Gestational diabetes   • Pain of right heel   • Right flank pain   • Displacement of lumbar intervertebral disc   • Lumbar foraminal stenosis   • Greater trochanteric bursitis of both hips   • Bilateral primary osteoarthritis of knee   • Myofascial pain   • Physical deconditioning   • Insomnia   • Cervical spondylosis without myelopathy   • RLQ abdominal pain   • Great toe pain, right   • Myalgia   • Rash and other nonspecific skin eruption        Past Medical History:   Diagnosis Date   • Abdominal pain    • Abdominal pain, RUQ     Check cmp. refer to [facility] u/s and gen surg eval d/t cholelithiasis noted on CT of abd/pelvis donein ER    • Acute bronchitis     Take cefdinir and medrol dose pack as directed. Use otc mucinex. Continue to rest and push fluids. Call or rtc if no better. Gave phenergan w/ codeine cough syrup 5 ml po prn #120ml, no RF per Dr. Cesar   • Acute pharyngitis     Check for monod/t exposure/ Pt will restart flovent as used for esophagitis. If no improvement take the medrol dose pack she was given at least visit   • Acute sinusitis    • Acute upper respiratory infection    • Body aches    • Cholelithiasis     Reviewed CT of adb/pelvis from ER  showed cholelithiasis. Will refer for gen surgery eval and GB u/s   • Chronic pain disorder     • Cough    • Eosinophilic esophagitis    • Extremity pain    • Gestational diabetes    • Headache    • Influenza    • Labyrinthitis    • Low back pain    • Lumbar strain    • Lumbosacral disc disease    • Neck pain    • Right hip pain    • Shingles    • Strain of thoracic region    • Viral infection    • Viral pharyngitis         Past Surgical History:   Procedure Laterality Date   •  SECTION      97, 00, 04, 12   • DILATATION AND CURETTAGE     • ENDOSCOPY     • OOPHORECTOMY Right 2017   • WISDOM TOOTH EXTRACTION         Visit Dx:     ICD-10-CM ICD-9-CM   1. Acute right-sided low back pain with right-sided sciatica M54.41 724.2     724.3       Patient History     Row Name 18 0900             History    Chief Complaint  Difficulty with daily activities;Joint stiffness;Muscle tenderness;Pain;Tightness;Muscle weakness  -MM      Type of Pain  Back pain;Hip pain;Lower Extremity / Leg  -MM      Date Current Problem(s) Began  18  -MM      Brief Description of Current Complaint  Client presents with low back and right hip pain that started 18. She stood a lot on  cooking. She had severe pain Friday night and Saturday. She went to the doctor on Monday and received and injection of hydrocortisone. Symptoms continue to be severe.   -MM      Patient/Caregiver Goals  Relieve pain;Improve mobility;Improve strength;Return to prior level of function  -MM      Occupation/sports/leisure activities  enjoys music and dance  -MM      What clinical tests have you had for this problem?  MRI  -MM      Results of Clinical Tests  2016- report of multiple disc bulges in lumbar spine  -MM      Are you or can you be pregnant  No  -MM         Pain     Pain Location  Back;Hip;Leg  -MM      Pain at Present  4  -MM      Pain at Best  2  -MM      Pain at Worst  8  -MM      Pain Description  Sharp;Aching;Dull;Tightness;Spasm  -MM      Pain Comments  Pain is worse at morning and at  night. She has to sit upright or pain is intense. Pain is often sharp with movement. She reports frequent muscle spasms after movement.   -MM      Is your sleep disturbed?  Yes  -MM      Is medication used to assist with sleep?  Yes  -MM      Total hours of sleep per night  wake every few hours  -MM      Difficulties with ADL's?  sitting, standing, rising, walking, bending, twisting  -MM         Fall Risk Assessment    Any falls in the past year:  No  -MM         Daily Activities    Primary Language  English  -MM      Are you able to read  Yes  -MM      Are you able to write  Yes  -MM      How does patient learn best?  Listening;Reading;Demonstration  -MM      Barriers to learning  None  -MM      Pt Participated in POC and Goals  Yes  -MM         Safety    Are you being hurt, hit, or frightened by anyone at home or in your life?  No  -MM      Are you being neglected by a caregiver  No  -MM        User Key  (r) = Recorded By, (t) = Taken By, (c) = Cosigned By    Initials Name Provider Type    MM Shay Taylor, PT Physical Therapist          PT Ortho     Row Name 11/29/18 7181       Precautions and Contraindications    Precautions  none/history of bulging discs lumbar region  -MM       Posture/Observations    Posture/Observations Comments  Guarded with movement/difficulty rising. No obvious postural abnormality. Palpation: tenderness right lumbar paravertebrals and central lumbar region. Tenderness to R Lumbosacral region and posterior gluteal region. Pain also wraps around to proximal right hip flexors.   -MM       Neural Tension Signs- Lower Quarter Clearing    Slump  Positive  -MM    SLR  Right:;Positive  -MM       Lumbar ROM Screen- Lower Quarter Clearing    Lumbar Flexion  Impaired severe loss of motion; increased pain  -MM    Lumbar Extension  Impaired moderate loss of motion; increased pain  -MM    Lumbar Lateral Flexion  Impaired SGIS R: severe loss with pain; SGIS L: moderate loss with P  -MM        General ROM    GENERAL ROM COMMENTS  Repeated EIL is tolerated well with some loss of motion into extension, but better overall mobiltity after repetition. Increased pain after gentle clinicial overpressure. Repeated SGIS (trunk right/hips left) is very limited. She is able to get to a point in SGIS when pain eases some, but she has increased overall discomfort after repetition. Unable to tolerate positioning for EIL with hips off center.  -MM       Sensation    Sensation WNL?  WNL  -MM      User Key  (r) = Recorded By, (t) = Taken By, (c) = Cosigned By    Initials Name Provider Type    Shay Petersen, PT Physical Therapist      Therapy Education  Education Details: Provided and reviewed home program. Exercises included: prone press-up and press-up and sag every 3 hours.      PT OP Goals     Row Name 11/29/18 0930          PT Short Term Goals    STG Date to Achieve  12/20/18  -MM     STG 1  Client will report 75% improvement in right low back and hip pain with daily activity.  -MM     STG 1 Progress  New  -MM     STG 2  Client will improve to rising from a chair without difficulty.  -MM     STG 2 Progress  New  -MM     STG 3  Client will be independent with home program to minimize pain.  -MM     STG 3 Progress  New  -MM     STG 4  Trunk mobility will improve to min loss of motion for flexion and extension.  -MM     STG 4 Progress  New  -MM        Long Term Goals    LTG Date to Achieve  01/10/19  -MM     LTG 1  MOdified Oswestry score will improve to 18% or better.   -MM     LTG 1 Progress  New  -MM     LTG 2  Client will be independent with home program to maximize strength and function.  -MM     LTG 2 Progress  New  -MM     LTG 3  Trunk motion side glide in standing will improve to normal left and right.  -MM     LTG 3 Progress  New  -MM        Time Calculation    PT Goal Re-Cert Due Date  02/27/19  -MM       User Key  (r) = Recorded By, (t) = Taken By, (c) = Cosigned By    Initials Name Provider Type    SLY  Shay Taylor, PT Physical Therapist          PT Assessment/Plan     Row Name 11/29/18 0930          PT Assessment    Functional Limitations  Limitation in home management;Limitations in community activities;Limitations in functional capacity and performance;Performance in leisure activities;Performance in self-care ADL  -MM     Impairments  Muscle strength;Pain;Range of motion  -MM     Assessment Comments  Client presents with evolving symptoms of low complexity. Signs and symptoms are consistent with low back pain related to derangement syndrome. She has signs of posterior derangement with a signficant lateral component. Skilled intervention is required to minimize pain and improve overall function.  -MM     Please refer to paper survey for additional self-reported information  Yes  -MM     Rehab Potential  Good  -MM     Patient/caregiver participated in establishment of treatment plan and goals  Yes  -MM     Patient would benefit from skilled therapy intervention  Yes  -MM        PT Plan    PT Frequency  2x/week  -MM     Predicted Duration of Therapy Intervention (Therapy Eval)  8-12 visits  -MM     Planned CPT's?  PT EVAL LOW COMPLEXITY: 57247;PT THER PROC EA 15 MIN: 58276;PT MANUAL THERAPY EA 15 MIN: 80847;PT NEUROMUSC RE-EDUCATION EA 15 MIN: 33580  -MM     PT Plan Comments  Continue per plan of treatment with exercises to minimize pain. Consider ASTYM. Incorporate extension as able and begin SGIS hips right when able.   -MM       User Key  (r) = Recorded By, (t) = Taken By, (c) = Cosigned By    Initials Name Provider Type    MM Shay Taylor, PT Physical Therapist        Outcome Measure Options: Modifed Owestry  Modified Oswestry  Modified Oswestry Score/Comments: 66%      Time Calculation:     Therapy Suggested Charges     Code   Minutes Charges    None             Start Time: 0930     Therapy Charges for Today     Code Description Service Date Service Provider Modifiers Qty    61089453533  PT EVAL  LOW COMPLEXITY 4 11/29/2018 Shay Taylor, PT GP 1          PT G-Codes  Outcome Measure Options: Modifed Owestry  Modified Oswestry Score/Comments: 66%         Shay Taylor, PT  11/29/2018

## 2018-11-30 RX ORDER — PREDNISONE 10 MG/1
TABLET ORAL
Qty: 1 EACH | Refills: 0 | Status: SHIPPED | OUTPATIENT
Start: 2018-11-30 | End: 2019-05-13

## 2018-12-06 ENCOUNTER — HOSPITAL ENCOUNTER (OUTPATIENT)
Dept: PHYSICAL THERAPY | Facility: HOSPITAL | Age: 48
Setting detail: THERAPIES SERIES
Discharge: HOME | End: 2018-12-06

## 2018-12-06 DIAGNOSIS — M54.41 ACUTE RIGHT-SIDED LOW BACK PAIN WITH RIGHT-SIDED SCIATICA: Primary | ICD-10-CM

## 2018-12-06 PROCEDURE — 97110 THERAPEUTIC EXERCISES: CPT

## 2018-12-06 NOTE — THERAPY TREATMENT NOTE
Outpatient Physical Therapy Ortho Treatment Note  [facility]     Patient Name: Gino Maguire  : 1970  MRN: [id number]  Today's Date: 2018      Visit Date: 2018    Visit Dx:    ICD-10-CM ICD-9-CM   1. Acute right-sided low back pain with right-sided sciatica M54.41 724.2     724.3       Patient Active Problem List   Diagnosis   • Right upper quadrant pain   • Anxiety   • Arthritis   • Axillary lymphadenopathy   • Chronic antral gastritis   • Eosinophilic esophagitis   • Hypertension   • Labyrinthitis   • Right-sided low back pain with right-sided sciatica   • Cyst of ovary   • Prolonged depressive adjustment reaction   • Arthralgia of hip   • Herpes zoster   • Strain of thoracic region   • Thrush, oral   • Grief reaction   • Eyelid gland swelling   • Gestational diabetes   • Pain of right heel   • Right flank pain   • Displacement of lumbar intervertebral disc   • Lumbar foraminal stenosis   • Greater trochanteric bursitis of both hips   • Bilateral primary osteoarthritis of knee   • Myofascial pain   • Physical deconditioning   • Insomnia   • Cervical spondylosis without myelopathy   • RLQ abdominal pain   • Great toe pain, right   • Myalgia   • Rash and other nonspecific skin eruption        Past Medical History:   Diagnosis Date   • Abdominal pain    • Abdominal pain, RUQ     Check cmp. refer to [facility] u/s and gen surg eval d/t cholelithiasis noted on CT of abd/pelvis donein ER    • Acute bronchitis     Take cefdinir and medrol dose pack as directed. Use otc mucinex. Continue to rest and push fluids. Call or rtc if no better. Gave phenergan w/ codeine cough syrup 5 ml po prn #120ml, no RF per Dr. Cesar   • Acute pharyngitis     Check for monod/t exposure/ Pt will restart flovent as used for esophagitis. If no improvement take the medrol dose pack she was given at least visit   • Acute sinusitis    • Acute upper respiratory infection    • Body aches    • Cholelithiasis     Reviewed CT  of adb/pelvis from ER  showed cholelithiasis. Will refer for gen surgery eval and GB u/s   • Chronic pain disorder    • Cough    • Eosinophilic esophagitis    • Extremity pain    • Gestational diabetes    • Headache    • Influenza    • Labyrinthitis    • Low back pain    • Lumbar strain    • Lumbosacral disc disease    • Neck pain    • Right hip pain    • Shingles    • Strain of thoracic region    • Viral infection    • Viral pharyngitis         Past Surgical History:   Procedure Laterality Date   •  SECTION      97, 00, 04, 12   • DILATATION AND CURETTAGE     • ENDOSCOPY     • OOPHORECTOMY Right 2017   • WISDOM TOOTH EXTRACTION         PT Ortho     Row Name 18 1645       Lumbar ROM Screen- Lower Quarter Clearing    Lumbar Flexion  Impaired moderate loss of motion  -MM    Lumbar Extension  Impaired moderate loss of motion with pain  -MM    Lumbar Lateral Flexion  Impaired SGIS R: moderate loss with pain; L: normal  -MM       General ROM    GENERAL ROM COMMENTS  repeated EIL with hips off center (hips left) abolishes symptoms for at least a few minutes.   -MM      User Key  (r) = Recorded By, (t) = Taken By, (c) = Cosigned By    Initials Name Provider Type    Shay Petersen, PT Physical Therapist          PT Assessment/Plan     Row Name 18 4520          PT Assessment    Assessment Comments  Symptoms abolished for a few minutes by performing press-ups with hips off center (hips left). After symptoms returned they abolished again with EIL and hips off center. Clinician overpressure with EIL and hips off center was also tolerated well. Symptoms continue to be consistent with derangement syndrome.   -MM        PT Plan    PT Plan Comments  Continue per plan of treatment with exercises and work toward centralization and abolishing symptoms.   -MM       User Key  (r) = Recorded By, (t) = Taken By, (c) = Cosigned By    Initials Name Provider Type    Shay Petersen  L, PT Physical Therapist              Exercises     Row Name 12/06/18 1645             Subjective Comments    Subjective Comments  Client reports that symptoms are starting to centralize, but have not gone away. She has been working on press-ups and they seem very helpful. She has not had as much radicular pain in her leg.  Pain today is reported right LS region and into right gluteal region.   -MM         Subjective Pain    Able to rate subjective pain?  yes  -MM      Pre-Treatment Pain Level  3  -MM      Post-Treatment Pain Level  1  -MM         Total Minutes    60964 - PT Therapeutic Exercise Minutes  30  -MM         Exercise 1    Exercise Name 1  Continued with assessment of mobility. Included press-ups, press-up and sag. Included press-up with hips off center for 2 sets 10. Included press-ups with hips off center and PA clinical overpressure. Mobility was re-checked in standing between exercises. Another set of press-ups with hips off center was included to minimize symptoms.  Updated home program to include press-up with hips off center every 2-3 hours. Also showed how to use a door frame for hips off center.   -MM      Cueing 1  Verbal;Tactile;Demo  -MM      Time 1  30  -MM      Additional Comments  ther ex  -MM        User Key  (r) = Recorded By, (t) = Taken By, (c) = Cosigned By    Initials Name Provider Type    Shay Petersen, PT Physical Therapist        Time Calculation:   Start Time: 1645  Therapy Suggested Charges     Code   Minutes Charges    52755 (CPT®) Hc Pt Neuromusc Re Education Ea 15 Min      42550 (CPT®) Hc Pt Ther Proc Ea 15 Min 30 2    29133 (CPT®) Hc Gait Training Ea 15 Min      15754 (CPT®) Hc Pt Therapeutic Act Ea 15 Min      50407 (CPT®) Hc Pt Manual Therapy Ea 15 Min      86499 (CPT®) Hc Pt Ther Massage- Per 15 Min      35320 (CPT®) Hc Pt Iontophoresis Ea 15 Min      20227 (CPT®) Hc Pt Elec Stim Ea-Per 15 Min      82245 (CPT®) Hc Pt Ultrasound Ea 15 Min      25235 (CPT®) Hc Pt Self  Care/Mgmt/Train Ea 15 Min      39407 (CPT®) Hc Pt Prosthetic (S) Train Initial Encounter, Each 15 Min      64103 (CPT®) Hc Orthotic(S) Mgmt/Train Initial Encounter, Each 15min      36030 (CPT®) Hc Pt Aquatic Therapy Ea 15 Min      30995 (CPT®) Hc Pt Orthotic(S)/Prosthetic(S) Encounter, Each 15 Min       (CPT®) Hc Pt Electrical Stim Unattended      Total  30 2        Therapy Charges for Today     Code Description Service Date Service Provider Modifiers Qty    96563380378 HC PT THER PROC EA 15 MIN 12/6/2018 Shay Taylor, PT GP 2                    Shay Taylor, PT  12/6/2018

## 2018-12-10 RX ORDER — LEVOMEFOLATE/ALGAL OIL 15-90.314
1 CAPSULE ORAL DAILY
Qty: 30 CAPSULE | Refills: 0 | Status: CANCELLED | OUTPATIENT
Start: 2018-12-10

## 2018-12-11 ENCOUNTER — HOSPITAL ENCOUNTER (OUTPATIENT)
Dept: PHYSICAL THERAPY | Facility: HOSPITAL | Age: 48
Setting detail: THERAPIES SERIES
Discharge: HOME | End: 2018-12-11

## 2018-12-11 DIAGNOSIS — M54.41 ACUTE RIGHT-SIDED LOW BACK PAIN WITH RIGHT-SIDED SCIATICA: Primary | ICD-10-CM

## 2018-12-11 PROCEDURE — 97110 THERAPEUTIC EXERCISES: CPT

## 2018-12-12 RX ORDER — LEVOMEFOLATE/ALGAL OIL 15-90.314
1 CAPSULE ORAL DAILY
Qty: 30 CAPSULE | Refills: 0 | Status: SHIPPED | OUTPATIENT
Start: 2018-12-12 | End: 2019-01-15 | Stop reason: SDUPTHER

## 2018-12-12 NOTE — THERAPY TREATMENT NOTE
Outpatient Physical Therapy Ortho Treatment Note  [facility]     Patient Name: Gino Maguire  : 1970  MRN: [id number]  Today's Date: 2018      Visit Date: 2018    Visit Dx:    ICD-10-CM ICD-9-CM   1. Acute right-sided low back pain with right-sided sciatica M54.41 724.2     724.3       Patient Active Problem List   Diagnosis   • Right upper quadrant pain   • Anxiety   • Arthritis   • Axillary lymphadenopathy   • Chronic antral gastritis   • Eosinophilic esophagitis   • Hypertension   • Labyrinthitis   • Right-sided low back pain with right-sided sciatica   • Cyst of ovary   • Prolonged depressive adjustment reaction   • Arthralgia of hip   • Herpes zoster   • Strain of thoracic region   • Thrush, oral   • Grief reaction   • Eyelid gland swelling   • Gestational diabetes   • Pain of right heel   • Right flank pain   • Displacement of lumbar intervertebral disc   • Lumbar foraminal stenosis   • Greater trochanteric bursitis of both hips   • Bilateral primary osteoarthritis of knee   • Myofascial pain   • Physical deconditioning   • Insomnia   • Cervical spondylosis without myelopathy   • RLQ abdominal pain   • Great toe pain, right   • Myalgia   • Rash and other nonspecific skin eruption        Past Medical History:   Diagnosis Date   • Abdominal pain    • Abdominal pain, RUQ     Check cmp. refer to [facility] u/s and gen surg eval d/t cholelithiasis noted on CT of abd/pelvis donein ER    • Acute bronchitis     Take cefdinir and medrol dose pack as directed. Use otc mucinex. Continue to rest and push fluids. Call or rtc if no better. Gave phenergan w/ codeine cough syrup 5 ml po prn #120ml, no RF per Dr. Cesar   • Acute pharyngitis     Check for monod/t exposure/ Pt will restart flovent as used for esophagitis. If no improvement take the medrol dose pack she was given at least visit   • Acute sinusitis    • Acute upper respiratory infection    • Body aches    • Cholelithiasis     Reviewed  CT of adb/pelvis from ER  showed cholelithiasis. Will refer for gen surgery eval and GB u/s   • Chronic pain disorder    • Cough    • Eosinophilic esophagitis    • Extremity pain    • Gestational diabetes    • Headache    • Influenza    • Labyrinthitis    • Low back pain    • Lumbar strain    • Lumbosacral disc disease    • Neck pain    • Right hip pain    • Shingles    • Strain of thoracic region    • Viral infection    • Viral pharyngitis         Past Surgical History:   Procedure Laterality Date   •  SECTION      97, 00, 04, 12   • DILATATION AND CURETTAGE     • ENDOSCOPY     • OOPHORECTOMY Right 2017   • WISDOM TOOTH EXTRACTION                         PT Assessment/Plan     Row Name 18 1100          PT Assessment    Assessment Comments  Client continues to notice relief with extension exercises. The most relief was noticed with EIL with hips off center and it is tolerated better without overpressure. She also notices some relief with prolonged EIL on angled table. Foam roll provided some thoracic spine relief.   -MM        PT Plan    PT Plan Comments  Continue per plan of treatment with extension exercises.   -MM       User Key  (r) = Recorded By, (t) = Taken By, (c) = Cosigned By    Initials Name Provider Type    MM Shay Taylor, PT Physical Therapist              Exercises     Row Name 18 1100             Subjective Comments    Subjective Comments  Client reports that symptoms are improving. She reports mild pain today at 1/10.   -MM         Subjective Pain    Able to rate subjective pain?  yes  -MM      Pre-Treatment Pain Level  1  -MM      Post-Treatment Pain Level  0  -MM         Total Minutes    06192 - PT Therapeutic Exercise Minutes  30  -MM         Exercise 1    Exercise Name 1  Reviewed exercises and included several sets of press-ups with hips center and off center. Included press-ups with PA overpressure and side glide overpressure. Also included  prolonged EIL with angled table in 30 second sets.  Updated home program to include thoracic extension over foam roll and rolling on foam roll for thoracic region.  -MM      Cueing 1  Verbal  -MM      Time 1  30   -MM      Additional Comments  ther ex  -MM        User Key  (r) = Recorded By, (t) = Taken By, (c) = Cosigned By    Initials Name Provider Type    Shay Petersen, PT Physical Therapist                                            Time Calculation:   Start Time: 1100  Therapy Suggested Charges     Code   Minutes Charges    02980 (CPT®) Hc Pt Neuromusc Re Education Ea 15 Min      79902 (CPT®) Hc Pt Ther Proc Ea 15 Min 30 2    03175 (CPT®) Hc Gait Training Ea 15 Min      84196 (CPT®) Hc Pt Therapeutic Act Ea 15 Min      28010 (CPT®) Hc Pt Manual Therapy Ea 15 Min      08405 (CPT®) Hc Pt Ther Massage- Per 15 Min      14261 (CPT®) Hc Pt Iontophoresis Ea 15 Min      88107 (CPT®) Hc Pt Elec Stim Ea-Per 15 Min      24180 (CPT®) Hc Pt Ultrasound Ea 15 Min      68777 (CPT®) Hc Pt Self Care/Mgmt/Train Ea 15 Min      94922 (CPT®) Hc Pt Prosthetic (S) Train Initial Encounter, Each 15 Min      21124 (CPT®) Hc Orthotic(S) Mgmt/Train Initial Encounter, Each 15min      55972 (CPT®) Hc Pt Aquatic Therapy Ea 15 Min      62768 (CPT®) Hc Pt Orthotic(S)/Prosthetic(S) Encounter, Each 15 Min       (CPT®) Hc Pt Electrical Stim Unattended      Total  30 2        Therapy Charges for Today     Code Description Service Date Service Provider Modifiers Qty    06759260633 HC PT THER PROC EA 15 MIN 12/11/2018 Shay Taylor, PT GP 2                    Shay Taylor, PT  12/11/2018

## 2019-01-05 ENCOUNTER — OFFICE VISIT (OUTPATIENT)
Dept: INTERNAL MEDICINE | Facility: CLINIC | Age: 49
End: 2019-01-05

## 2019-01-05 VITALS
DIASTOLIC BLOOD PRESSURE: 82 MMHG | HEIGHT: 63 IN | SYSTOLIC BLOOD PRESSURE: 134 MMHG | OXYGEN SATURATION: 98 % | BODY MASS INDEX: 31.71 KG/M2 | HEART RATE: 75 BPM | WEIGHT: 179 LBS | TEMPERATURE: 97.9 F

## 2019-01-05 DIAGNOSIS — R05.9 COUGH: Primary | ICD-10-CM

## 2019-01-05 PROCEDURE — 99213 OFFICE O/P EST LOW 20 MIN: CPT | Performed by: NURSE PRACTITIONER

## 2019-01-05 RX ORDER — ALBUTEROL SULFATE 90 UG/1
2 AEROSOL, METERED RESPIRATORY (INHALATION) EVERY 4 HOURS PRN
Qty: 1 INHALER | Refills: 0 | Status: SHIPPED | OUTPATIENT
Start: 2019-01-05 | End: 2020-07-14 | Stop reason: SDUPTHER

## 2019-01-05 RX ORDER — METHYLPREDNISOLONE 4 MG/1
TABLET ORAL
Qty: 1 EACH | Refills: 0 | Status: SHIPPED | OUTPATIENT
Start: 2019-01-05 | End: 2019-05-13

## 2019-01-05 NOTE — PROGRESS NOTES
Subjective   Gino Maguire is a 48 y.o. female.   Chief Complaint   Patient presents with   • Cough     x week   • Nasal Congestion      History of Present Illness  Sick since [place] with cough, cold and congestion.  No fever or chills.   Has clear sinus drainage.  Has popping and cracking of ears.  No headache.  Has a nonproductive cough.  No shortness of air, wheezing, chest pain, abdominal pain, nausea vomiting or diarrhea.  Not taking anything for cough.     The following portions of the patient's history were reviewed and updated as appropriate: allergies, current medications, past family history, past medical history, past social history, past surgical history and problem list.    Current Outpatient Medications:   •  ALPRAZolam (XANAX) 0.5 MG tablet, One po bid prn, Disp: 60 tablet, Rfl: 0  •  buPROPion XL (WELLBUTRIN XL) 150 MG 24 hr tablet, Take 1 tablet by mouth Daily., Disp: , Rfl: 0  •  FLOVENT  MCG/ACT inhaler, inhale 2 puffs by mouth twice a day Rinse mouth after use, Disp: 12 g, Rfl: 2  •  L-Methylfolate-Algae (L-METHYLFOLATE FORTE) 15-90.314 MG capsule, Take 1 tablet by mouth Daily., Disp: 30 capsule, Rfl: 0  •  meloxicam (MOBIC) 15 MG tablet, Take 15 mg by mouth Daily., Disp: , Rfl:   •  metoclopramide (REGLAN) 10 MG tablet, Take  by mouth., Disp: , Rfl:   •  Multiple Vitamins-Minerals (MULTIVITAMIN WITH MINERALS) tablet tablet, Take 1 tablet by mouth Daily., Disp: , Rfl:   •  ondansetron (ZOFRAN) 4 MG tablet, take 1 tablet by mouth every 8 hours if needed for nausea and vomiting, Disp: 14 tablet, Rfl: 1  •  pantoprazole (PROTONIX) 40 MG EC tablet, Take 1 tablet by mouth Daily., Disp: 10 tablet, Rfl: 0  •  PredniSONE (DELTASONE) 10 MG (21) tablet pack, Use as directed on package, Disp: 1 each, Rfl: 0  •  promethazine (PHENERGAN) 25 MG tablet, Take 1 tablet by mouth Every 8 (Eight) Hours As Needed for Nausea or Vomiting., Disp: 21 tablet, Rfl: 0  •  propranolol (INDERAL) 10 MG tablet,  "Take 10 mg by mouth 2 (Two) Times a Day., Disp: , Rfl: 0  •  tiZANidine (ZANAFLEX) 4 MG tablet, Take 4 mg by mouth At Night As Needed for Muscle Spasms., Disp: , Rfl:   •  albuterol sulfate  (90 Base) MCG/ACT inhaler, Inhale 2 puffs Every 4 (Four) Hours As Needed for Wheezing., Disp: 1 inhaler, Rfl: 0  •  MethylPREDNISolone (MEDROL, CJ,) 4 MG tablet, Take as directed on package instructions., Disp: 1 each, Rfl: 0    Review of Systems Consitutional, HEENT, Respiratory, CV, GI, , Skin, Musculoskeletal, Neuro-mental, Endocrinological, Hematological were reviewed.  Positives were discussed in the HPI, otherwise ROS was negative   /82   Pulse 75   Temp 97.9 °F (36.6 °C)   Ht 160 cm (63\")   Wt 81.2 kg (179 lb)   SpO2 98%   Breastfeeding? No   BMI 31.71 kg/m²     Objective   Allergies   Allergen Reactions   • Maxalt [Rizatriptan] Anaphylaxis   • Sumatriptan Anaphylaxis   • Zolmitriptan Anaphylaxis   • Labetalol Rash   • Sulfa Antibiotics Rash       Physical Exam   Constitutional: She is oriented to person, place, and time. She appears well-developed and well-nourished. No distress.   HENT:   Head: Normocephalic.   Right Ear: External ear normal.   Left Ear: External ear normal.   Mouth/Throat: Oropharynx is clear and moist.   TMs are dull.  No pain over frontal or maxillary sinuses.  Throat clear   Eyes: Right eye exhibits no discharge. Left eye exhibits no discharge. No scleral icterus.   Neck: Neck supple.   Cardiovascular: Normal rate, regular rhythm, normal heart sounds and intact distal pulses. Exam reveals no gallop and no friction rub.   No murmur heard.  Pulmonary/Chest: Effort normal and breath sounds normal. No stridor. No respiratory distress. She has no wheezes. She has no rales.   Lymphadenopathy:     She has no cervical adenopathy.   Neurological: She is alert and oriented to person, place, and time.   Skin: Skin is warm and dry. Capillary refill takes less than 2 seconds.   Is pink, no " rash    Nursing note and vitals reviewed.      Procedures        Assessment/Plan   Gino was seen today for cough and nasal congestion.    Diagnoses and all orders for this visit:    Cough  -     MethylPREDNISolone (MEDROL, CJ,) 4 MG tablet; Take as directed on package instructions.  -     albuterol sulfate  (90 Base) MCG/ACT inhaler; Inhale 2 puffs Every 4 (Four) Hours As Needed for Wheezing.          Patient Instructions   Drink plenty of fluids.  Meds per MAR.  Follow-up with your primary care provider in one week.  May need to have a chest x-ray.  Pt verbalizes understanding and agreement with plan of care.   EMR Dragon/transcription disclaimer:  Please note that portions of this note were completed with a voice recognition program.  Electronic transcription of the voice recognition program may permit erroneous words or phrases to be inadvertently transcribed.  Although I have reviewed the note for such errors, some may still exist in this documentation     Jennifer Benitez, APRN

## 2019-01-07 NOTE — PATIENT INSTRUCTIONS
Drink plenty of fluids.  Meds per MAR.  Follow-up with your primary care provider in one week.  May need to have a chest x-ray.  Pt verbalizes understanding and agreement with plan of care.

## 2019-01-14 RX ORDER — BUPROPION HYDROCHLORIDE 150 MG/1
TABLET ORAL
Qty: 30 TABLET | Refills: 5 | Status: SHIPPED | OUTPATIENT
Start: 2019-01-14 | End: 2020-06-26 | Stop reason: SDUPTHER

## 2019-01-15 RX ORDER — LEVOMEFOLATE/ALGAL OIL 15-90.314
1 CAPSULE ORAL DAILY
Qty: 30 CAPSULE | Refills: 0 | Status: SHIPPED | OUTPATIENT
Start: 2019-01-15 | End: 2019-05-19 | Stop reason: SDUPTHER

## 2019-02-18 ENCOUNTER — DOCUMENTATION (OUTPATIENT)
Dept: PHYSICAL THERAPY | Facility: HOSPITAL | Age: 49
End: 2019-02-18

## 2019-02-18 DIAGNOSIS — M54.41 ACUTE RIGHT-SIDED LOW BACK PAIN WITH RIGHT-SIDED SCIATICA: Primary | ICD-10-CM

## 2019-02-18 NOTE — THERAPY DISCHARGE NOTE
Outpatient Physical Therapy Discharge Summary         Patient Name: Gino Maguire  : 1970  MRN: [id number]    Today's Date: 2019    Visit Dx:    ICD-10-CM ICD-9-CM   1. Acute right-sided low back pain with right-sided sciatica M54.41 724.2     724.3       PT OP Goals     Row Name 19 1012          PT Short Term Goals    STG Date to Achieve  18  -AC     STG 1  Client will report 75% improvement in right low back and hip pain with daily activity.  -AC     STG 1 Progress  Not Met  -AC     STG 2  Client will improve to rising from a chair without difficulty.  -AC     STG 2 Progress  Met  -AC     STG 3  Client will be independent with home program to minimize pain.  -AC     STG 3 Progress  Met  -AC     STG 4  Trunk mobility will improve to min loss of motion for flexion and extension.  -AC     STG 4 Progress  Not Met  -AC        Long Term Goals    LTG Date to Achieve  01/10/19  -AC     LTG 1  MOdified Oswestry score will improve to 18% or better.   -AC     LTG 1 Progress  Not Met  -AC     LTG 2  Client will be independent with home program to maximize strength and function.  -AC     LTG 2 Progress  Not Met  -AC     LTG 3  Trunk motion side glide in standing will improve to normal left and right.  -AC     LTG 3 Progress  Not Met  -AC        Time Calculation    PT Goal Re-Cert Due Date  19  -AC       User Key  (r) = Recorded By, (t) = Taken By, (c) = Cosigned By    Initials Name Provider Type    Cherelle Islas, PT Physical Therapist        OP PT Discharge Summary  Date of Discharge: 19  Reason for Discharge: Maximum functional potential achieved, Non-compliant  Outcomes Achieved: Patient able to partially acheive established goals  Discharge Destination: Home with home program  Discharge Instructions/Additional Comments: Pt was seen for two sessions following IE to address R-sided LBP with R-sided sciatica.  Pt reported significant improvement in pain during last  treatment session and had been faithful with HEP.  However, pt cancelled and did not show to her two following scheduled appts.  With this, formal reassessment measures of progress were unable to be obtained.  Pt will be discharged with HEP at this time.    Time Calculation:   Start Time: 1012    Therapy Suggested Charges     Code   Minutes Charges    None           Cherelle Armstrong, PT  2/18/2019

## 2019-05-13 ENCOUNTER — OFFICE VISIT (OUTPATIENT)
Dept: INTERNAL MEDICINE | Facility: CLINIC | Age: 49
End: 2019-05-13

## 2019-05-13 VITALS
WEIGHT: 193.4 LBS | TEMPERATURE: 97.7 F | SYSTOLIC BLOOD PRESSURE: 142 MMHG | DIASTOLIC BLOOD PRESSURE: 88 MMHG | OXYGEN SATURATION: 99 % | HEIGHT: 63 IN | HEART RATE: 83 BPM | BODY MASS INDEX: 34.27 KG/M2

## 2019-05-13 DIAGNOSIS — M25.50 ARTHRALGIA, UNSPECIFIED JOINT: ICD-10-CM

## 2019-05-13 DIAGNOSIS — I10 ESSENTIAL HYPERTENSION: ICD-10-CM

## 2019-05-13 DIAGNOSIS — Z87.898 HISTORY OF BREAST LUMP: ICD-10-CM

## 2019-05-13 DIAGNOSIS — R79.82 ELEVATED C-REACTIVE PROTEIN (CRP): ICD-10-CM

## 2019-05-13 DIAGNOSIS — R76.8 POSITIVE SM/RNP ANTIBODY: ICD-10-CM

## 2019-05-13 DIAGNOSIS — Z00.00 ANNUAL PHYSICAL EXAM: Primary | ICD-10-CM

## 2019-05-13 DIAGNOSIS — Z12.39 BREAST CANCER SCREENING: ICD-10-CM

## 2019-05-13 DIAGNOSIS — R76.0 ABNORMAL ANTIBODY TITER: ICD-10-CM

## 2019-05-13 LAB
BILIRUB BLD-MCNC: ABNORMAL MG/DL
CLARITY, POC: CLEAR
COLOR UR: YELLOW
GLUCOSE UR STRIP-MCNC: NEGATIVE MG/DL
KETONES UR QL: NEGATIVE
LEUKOCYTE EST, POC: NEGATIVE
NITRITE UR-MCNC: NEGATIVE MG/ML
PH UR: 5 [PH] (ref 5–8)
POC CREATININE URINE: NORMAL
POC MICROALBUMIN URINE: NORMAL
PROT UR STRIP-MCNC: NEGATIVE MG/DL
RBC # UR STRIP: NEGATIVE /UL
SP GR UR: 1.01 (ref 1–1.03)
UROBILINOGEN UR QL: NORMAL

## 2019-05-13 PROCEDURE — 81003 URINALYSIS AUTO W/O SCOPE: CPT | Performed by: NURSE PRACTITIONER

## 2019-05-13 PROCEDURE — 99396 PREV VISIT EST AGE 40-64: CPT | Performed by: NURSE PRACTITIONER

## 2019-05-13 PROCEDURE — 82044 UR ALBUMIN SEMIQUANTITATIVE: CPT | Performed by: NURSE PRACTITIONER

## 2019-05-13 RX ORDER — ERGOCALCIFEROL 1.25 MG/1
1 CAPSULE ORAL WEEKLY
Refills: 0 | COMMUNITY
Start: 2019-05-04 | End: 2020-07-14 | Stop reason: SDUPTHER

## 2019-05-13 NOTE — PATIENT INSTRUCTIONS
"DASH Eating Plan  DASH stands for \"Dietary Approaches to Stop Hypertension.\" The DASH eating plan is a healthy eating plan that has been shown to reduce high blood pressure (hypertension). It may also reduce your risk for type 2 diabetes, heart disease, and stroke. The DASH eating plan may also help with weight loss.  What are tips for following this plan?  General guidelines  · Avoid eating more than 2,300 mg (milligrams) of salt (sodium) a day. If you have hypertension, you may need to reduce your sodium intake to 1,500 mg a day.  · Limit alcohol intake to no more than 1 drink a day for nonpregnant women and 2 drinks a day for men. One drink equals 12 oz of beer, 5 oz of wine, or 1½ oz of hard liquor.  · Work with your health care provider to maintain a healthy body weight or to lose weight. Ask what an ideal weight is for you.  · Get at least 30 minutes of exercise that causes your heart to beat faster (aerobic exercise) most days of the week. Activities may include walking, swimming, or biking.  · Work with your health care provider or diet and nutrition specialist (dietitian) to adjust your eating plan to your individual calorie needs.  Reading food labels  · Check food labels for the amount of sodium per serving. Choose foods with less than 5 percent of the Daily Value of sodium. Generally, foods with less than 300 mg of sodium per serving fit into this eating plan.  · To find whole grains, look for the word \"whole\" as the first word in the ingredient list.  Shopping  · Buy products labeled as \"low-sodium\" or \"no salt added.\"  · Buy fresh foods. Avoid canned foods and premade or frozen meals.  Cooking  · Avoid adding salt when cooking. Use salt-free seasonings or herbs instead of table salt or sea salt. Check with your health care provider or pharmacist before using salt substitutes.  · Do not gomez foods. Cook foods using healthy methods such as baking, boiling, grilling, and broiling instead.  · Cook with " heart-healthy oils, such as olive, canola, soybean, or sunflower oil.  Meal planning    · Eat a balanced diet that includes:  ? 5 or more servings of fruits and vegetables each day. At each meal, try to fill half of your plate with fruits and vegetables.  ? Up to 6-8 servings of whole grains each day.  ? Less than 6 oz of lean meat, poultry, or fish each day. A 3-oz serving of meat is about the same size as a deck of cards. One egg equals 1 oz.  ? 2 servings of low-fat dairy each day.  ? A serving of nuts, seeds, or beans 5 times each week.  ? Heart-healthy fats. Healthy fats called Omega-3 fatty acids are found in foods such as flaxseeds and coldwater fish, like sardines, salmon, and mackerel.  · Limit how much you eat of the following:  ? Canned or prepackaged foods.  ? Food that is high in trans fat, such as fried foods.  ? Food that is high in saturated fat, such as fatty meat.  ? Sweets, desserts, sugary drinks, and other foods with added sugar.  ? Full-fat dairy products.  · Do not salt foods before eating.  · Try to eat at least 2 vegetarian meals each week.  · Eat more home-cooked food and less restaurant, buffet, and fast food.  · When eating at a restaurant, ask that your food be prepared with less salt or no salt, if possible.  What foods are recommended?  The items listed may not be a complete list. Talk with your dietitian about what dietary choices are best for you.  Grains  Whole-grain or whole-wheat bread. Whole-grain or whole-wheat pasta. Brown rice. Oatmeal. Quinoa. Bulgur. Whole-grain and low-sodium cereals. Lanie bread. Low-fat, low-sodium crackers. Whole-wheat flour tortillas.  Vegetables  Fresh or frozen vegetables (raw, steamed, roasted, or grilled). Low-sodium or reduced-sodium tomato and vegetable juice. Low-sodium or reduced-sodium tomato sauce and tomato paste. Low-sodium or reduced-sodium canned vegetables.  Fruits  All fresh, dried, or frozen fruit. Canned fruit in natural juice (without  added sugar).  Meat and other protein foods  Skinless chicken or turkey. Ground chicken or turkey. Pork with fat trimmed off. Fish and seafood. Egg whites. Dried beans, peas, or lentils. Unsalted nuts, nut butters, and seeds. Unsalted canned beans. Lean cuts of beef with fat trimmed off. Low-sodium, lean deli meat.  Dairy  Low-fat (1%) or fat-free (skim) milk. Fat-free, low-fat, or reduced-fat cheeses. Nonfat, low-sodium ricotta or cottage cheese. Low-fat or nonfat yogurt. Low-fat, low-sodium cheese.  Fats and oils  Soft margarine without trans fats. Vegetable oil. Low-fat, reduced-fat, or light mayonnaise and salad dressings (reduced-sodium). Canola, safflower, olive, soybean, and sunflower oils. Avocado.  Seasoning and other foods  Herbs. Spices. Seasoning mixes without salt. Unsalted popcorn and pretzels. Fat-free sweets.  What foods are not recommended?  The items listed may not be a complete list. Talk with your dietitian about what dietary choices are best for you.  Grains  Baked goods made with fat, such as croissants, muffins, or some breads. Dry pasta or rice meal packs.  Vegetables  Creamed or fried vegetables. Vegetables in a cheese sauce. Regular canned vegetables (not low-sodium or reduced-sodium). Regular canned tomato sauce and paste (not low-sodium or reduced-sodium). Regular tomato and vegetable juice (not low-sodium or reduced-sodium). Pickles. Olives.  Fruits  Canned fruit in a light or heavy syrup. Fried fruit. Fruit in cream or butter sauce.  Meat and other protein foods  Fatty cuts of meat. Ribs. Fried meat. Jackson. Sausage. Bologna and other processed lunch meats. Salami. Fatback. Hotdogs. Bratwurst. Salted nuts and seeds. Canned beans with added salt. Canned or smoked fish. Whole eggs or egg yolks. Chicken or turkey with skin.  Dairy  Whole or 2% milk, cream, and half-and-half. Whole or full-fat cream cheese. Whole-fat or sweetened yogurt. Full-fat cheese. Nondairy creamers. Whipped toppings.  Processed cheese and cheese spreads.  Fats and oils  Butter. Stick margarine. Lard. Shortening. Ghee. Jackson fat. Tropical oils, such as coconut, palm kernel, or palm oil.  Seasoning and other foods  Salted popcorn and pretzels. Onion salt, garlic salt, seasoned salt, table salt, and sea salt. Worcestershire sauce. Tartar sauce. Barbecue sauce. Teriyaki sauce. Soy sauce, including reduced-sodium. Steak sauce. Canned and packaged gravies. Fish sauce. Oyster sauce. Cocktail sauce. Horseradish that you find on the shelf. Ketchup. Mustard. Meat flavorings and tenderizers. Bouillon cubes. Hot sauce and Tabasco sauce. Premade or packaged marinades. Premade or packaged taco seasonings. Relishes. Regular salad dressings.  Where to find more information:  · National Heart, Lung, and [employer] [place]: www.nhlbi.nih.gov  · American Heart Association: www.heart.org  Summary  · The DASH eating plan is a healthy eating plan that has been shown to reduce high blood pressure (hypertension). It may also reduce your risk for type 2 diabetes, heart disease, and stroke.  · With the DASH eating plan, you should limit salt (sodium) intake to 2,300 mg a day. If you have hypertension, you may need to reduce your sodium intake to 1,500 mg a day.  · When on the DASH eating plan, aim to eat more fresh fruits and vegetables, whole grains, lean proteins, low-fat dairy, and heart-healthy fats.  · Work with your health care provider or diet and nutrition specialist (dietitian) to adjust your eating plan to your individual calorie needs.  This information is not intended to replace advice given to you by your health care provider. Make sure you discuss any questions you have with your health care provider.  Document Released: 12/06/2012 Document Revised: 12/11/2017 Document Reviewed: 12/11/2017  [employer] Patient Education © 2019 [employer]

## 2019-05-13 NOTE — PROGRESS NOTES
Chief Complaint   Patient presents with   • Annual Exam       HPI  Gino Maguire 49 y.o. female who presents for an Annual Physical.  Gino has a history of hypertension, gestational diabetes, anxiety depression, and vitamin D deficiency hypertension she takes propranolol which also helps with her anxiety as well.  States her blood pressure is higher this morning because she has not had her medication yet today.  Denies any headaches, vision changes, dizziness, or chest pain.  For anxiety and depression she takes Wellbutrin and propranolol; rarely has to take a Xanax.  Feels like her symptoms are well controlled.  No thoughts of self-harm or suicidal ideations.  She still is taking her vitamin D supplement.  She does have complaints of generalized joint pain arthritis pains and takes Mobic daily.  Her left knee has been bothering her a bit over the weekend but no acute injury.  She has seen rheumatology in the past with diagnosis of fibromyalgia followed through the [facility].  She takes turmeric and tries to not take the Mobic that often.  As needed Reglan and Zofran are noted on her medication list which she says that she only rarely would take if she had a migraine.  She is allergic to triptans. states that she has not had a migraine for years and has not had to use these medications recently.  Gino has been doing well without new interval problems. Plan to update vaccines if needed today.  Patient currently follows a heart healthy diet and gets routine exercise.  No inhaler use for months.  Normal stress test ekg nov 2018 with chest pain work-up.  Last pap year ago  See's dr valero  Mammogram due had clip status post biopsy and October 2018.  Is supposed to have a 6-month diagnostic follow-up mammogram for left breast lump located 10oclock  Hx mmr abnormal 2012 and had an MMR booster; she wants updated MMR titer to make sure her immune.    Review of Systems   Constitutional: Negative for  chills, fatigue and fever.   HENT: Negative.    Eyes: Negative for blurred vision and visual disturbance.   Respiratory: Negative for shortness of breath.    Cardiovascular: Negative for chest pain, palpitations and leg swelling.   Genitourinary: Positive for breast pain. Negative for breast discharge, difficulty urinating and breast lump.   Musculoskeletal: Positive for arthralgias and back pain. Negative for gait problem.   Neurological: Negative for dizziness, light-headedness and headache.   Psychiatric/Behavioral: Negative for self-injury, sleep disturbance, suicidal ideas and depressed mood. The patient is nervous/anxious.          [facility]  The following portions of the patient's history were reviewed and updated as appropriate: allergies, current medications, past family history, past medical history, past social history, past surgical history and problem list.     Social history:  Tobacco use none  Alcohol use rare  House; has smoke detectors  Uses seatbelt    Past Medical History:   Diagnosis Date   • Abdominal pain    • Abdominal pain, RUQ     Check cmp. refer to [facility] u/s and gen surg eval d/t cholelithiasis noted on CT of abd/pelvis donein ER 1/16   • Acute bronchitis     Take cefdinir and medrol dose pack as directed. Use otc mucinex. Continue to rest and push fluids. Call or rtc if no better. Gave phenergan w/ codeine cough syrup 5 ml po prn #120ml, no RF per Dr. Cesar   • Acute pharyngitis     Check for monod/t exposure/ Pt will restart flovent as used for esophagitis. If no improvement take the medrol dose pack she was given at least visit   • Acute sinusitis    • Acute upper respiratory infection    • Body aches    • Cholelithiasis     Reviewed CT of adb/pelvis from ER 1/16 showed cholelithiasis. Will refer for gen surgery eval and [facility] u/s   • Chronic pain disorder    • Cough    • Eosinophilic esophagitis    • Extremity pain    • Gestational diabetes    • Headache    • Influenza    • Labyrinthitis    •  Low back pain    • Lumbar strain    • Lumbosacral disc disease    • Neck pain    • Right hip pain    • Shingles    • Strain of thoracic region    • Viral infection    • Viral pharyngitis       Past Surgical History:   Procedure Laterality Date   •  SECTION      97, 00, 04, 12   • DILATATION AND CURETTAGE     • ENDOSCOPY     • OOPHORECTOMY Right 2017   • WISDOM TOOTH EXTRACTION        Immunization History   Administered Date(s) Administered   • Flu Mist 10/20/2015   • Flu Vaccine Quad PF >36MO 2017   • MMR 2012   • Tdap 2012   • influenza Split 2016     Health Maintenance   Topic Date Due   • PAP SMEAR  2016   • ZOSTER VACCINE (1 of 2) 2018   • INFLUENZA VACCINE  2019   • ANNUAL PHYSICAL  2020   • MAMMOGRAM  2020   • TDAP/TD VACCINES (2 - Td) 2022   • COLONOSCOPY  12/15/2027     Allergies   Allergen Reactions   • Maxalt [Rizatriptan] Anaphylaxis   • Sumatriptan Anaphylaxis   • Zolmitriptan Anaphylaxis   • Labetalol Rash   • Sulfa Antibiotics Rash      Family History   Problem Relation Age of Onset   • Arthritis Mother    • Hypertension Mother    • Thyroid disease Mother    • Arthritis Father    • Hypertension Father    • Heart disease Father    • Heart attack Other    • Arthritis Other    • Hypertension Other    • Migraines Other    • Stroke Other    • Breast cancer Other 31   • Ovarian cancer Cousin 42   • Heart failure Paternal Grandmother    • Endometrial cancer Neg Hx           Current Outpatient Medications:   •  albuterol sulfate  (90 Base) MCG/ACT inhaler, Inhale 2 puffs Every 4 (Four) Hours As Needed for Wheezing., Disp: 1 inhaler, Rfl: 0  •  ALPRAZolam (XANAX) 0.5 MG tablet, One po bid prn, Disp: 60 tablet, Rfl: 0  •  buPROPion XL (WELLBUTRIN XL) 150 MG 24 hr tablet, take 1 tablet by mouth every morning, Disp: 30 tablet, Rfl: 5  •  L-Methylfolate-Algae (L-METHYLFOLATE FORTE) 15-90.314 MG capsule, Take 1  "tablet by mouth Daily., Disp: 30 capsule, Rfl: 0  •  meloxicam (MOBIC) 15 MG tablet, Take 15 mg by mouth Daily., Disp: , Rfl:   •  metoclopramide (REGLAN) 10 MG tablet, Take  by mouth., Disp: , Rfl:   •  Multiple Vitamins-Minerals (MULTIVITAMIN WITH MINERALS) tablet tablet, Take 1 tablet by mouth Daily., Disp: , Rfl:   •  ondansetron (ZOFRAN) 4 MG tablet, take 1 tablet by mouth every 8 hours if needed for nausea and vomiting, Disp: 14 tablet, Rfl: 1  •  promethazine (PHENERGAN) 25 MG tablet, Take 1 tablet by mouth Every 8 (Eight) Hours As Needed for Nausea or Vomiting., Disp: 21 tablet, Rfl: 0  •  propranolol (INDERAL) 10 MG tablet, Take 10 mg by mouth 2 (Two) Times a Day., Disp: , Rfl: 0  •  tiZANidine (ZANAFLEX) 4 MG tablet, Take 4 mg by mouth At Night As Needed for Muscle Spasms., Disp: , Rfl:   •  vitamin D (ERGOCALCIFEROL) 60632 units capsule capsule, Take 1 capsule by mouth 1 (One) Time Per Week., Disp: , Rfl: 0    VITALS:  /88   Pulse 83   Temp 97.7 °F (36.5 °C) (Oral)   Ht 160 cm (63\")   Wt 87.7 kg (193 lb 6.4 oz)   SpO2 99%   Breastfeeding? No   BMI 34.26 kg/m²     Physical Exam   Constitutional: She is oriented to person, place, and time. She appears well-developed and well-nourished. No distress.   HENT:   Head: Normocephalic.   Right Ear: External ear normal.   Left Ear: External ear normal.   Nose: Nose normal.   Mouth/Throat: Oropharynx is clear and moist.   Eyes: EOM are normal. Pupils are equal, round, and reactive to light.   Neck: Normal range of motion. Neck supple.   Cardiovascular: Normal rate, regular rhythm, normal heart sounds and intact distal pulses.   Pulmonary/Chest: Effort normal and breath sounds normal. No respiratory distress.   Abdominal: Soft. Bowel sounds are normal. There is no tenderness.   Musculoskeletal: Normal range of motion.        Left knee: She exhibits normal range of motion, no swelling, no deformity, normal alignment, no LCL laxity, normal patellar " mobility, no bony tenderness, normal meniscus and no MCL laxity. No tenderness found.   Normal ROM all major joints   Lymphadenopathy:     She has no cervical adenopathy.   Neurological: She is alert and oriented to person, place, and time.   Skin: Skin is warm and dry. Capillary refill takes less than 2 seconds. No rash noted.   Psychiatric: She has a normal mood and affect. Her behavior is normal.   Vitals reviewed.      LABS  Results for orders placed or performed in visit on 05/13/19   POC Urinalysis Dipstick, Automated   Result Value Ref Range    Color Yellow Yellow, Straw, Dark Yellow, Constanza    Clarity, UA Clear Clear    Specific Gravity  1.015 1.005 - 1.030    pH, Urine 5.0 5.0 - 8.0    Leukocytes Negative Negative    Nitrite, UA Negative Negative    Protein, POC Negative Negative mg/dL    Glucose, UA Negative Negative, 1000 mg/dL (3+) mg/dL    Ketones, UA Negative Negative    Urobilinogen, UA Normal Normal    Bilirubin 1 mg/dL (A) Negative    Blood, UA Negative Negative   POCT microalbumin   Result Value Ref Range    Microalbumin, Urine 10 mg/l     Creatinine, Urine 100 mg/dl      CBC / Diff Ambiguous Default   Order: [id number]   Status:  Final result   Visible to patient:  Yes (MyChart)    Ref Range & Units 11d ago   WBC 3.4 - 10.8 x10E3/uL 7.0    RBC 3.77 - 5.28 x10E6/uL 4.19    Hemoglobin 11.1 - 15.9 g/dL 13.0    Hematocrit 34.0 - 46.6 % 38.9    MCV 79 - 97 fL 93    MCH 26.6 - 33.0 pg 31.0    MCHC 31.5 - 35.7 g/dL 33.4    RDW 12.3 - 15.4 % 14.3    Platelets 150 - 379 x10E3/uL 262    Comment:                **Effective May 20, 2019 the reference interval for**                    Platelets will be changing to:                                  0 - 7 d            140 - 396 x10E3/uL                                  8 - 30 d           139 - 531 x10E3/uL                                 31 d - 999 yrs      150 - 450 x10E3/uL    Neutrophil Rel % Not Estab. % 60    Lymphocyte Rel % Not Estab. % 29    Monocyte Rel %  Not Estab. % 5    Eosinophil Rel % Not Estab. % 6    Basophil Rel % Not Estab. % 0    Neutrophils Absolute 1.4 - 7.0 x10E3/uL 4.1    Lymphocytes Absolute 0.7 - 3.1 x10E3/uL 2.0    Monocytes Absolute 0.1 - 0.9 x10E3/uL 0.4    Eosinophils Absolute 0.0 - 0.4 x10E3/uL 0.4    Basophils Absolute 0.0 - 0.2 x10E3/uL 0.0    Immature Granulocyte Rel % Not Estab. % 0    Immature Grans Absolute 0.0 - 0.1 x10E3/uL 0.0    Comment: A hand-written panel/profile was received from your office. In   accordance with the [facility] Ambiguous Test Code Policy dated July 2003, we have assigned CBC with Differential/Platelet, Test Code   #649915 to this request. If this is not the testing you wished to   receive on this specimen, please contact the [facility] Client Inquiry/   Technical Services Department to clarify the test order. We   appreciate your business.    Resulting Agency  [facility]      Narrative   Performed by: [facility]   Performed at:  01  [place] [id number]  : James Cavazos PhD, Phone:  [phone]  Patient Fasting:  Y       Specimen Collected: 05/15/19 08:45 Last Resulted: 05/16/19 18:35         Contains abnormal data Comprehensive Metabolic Panel   Order: [id number]   Status:  Final result   Visible to patient:  Yes (MyChart)    Ref Range & Units 11d ago   Glucose 65 - 99 mg/dL 86    BUN 6 - 24 mg/dL 13    Creatinine 0.57 - 1.00 mg/dL 0.83    eGFR Non African Am >59 mL/min/1.73 82    eGFR African Am >59 mL/min/1.73 94    BUN/Creatinine Ratio 9 - 23 16    Sodium 134 - 144 mmol/L 138    Potassium 3.5 - 5.2 mmol/L 4.7    Chloride 96 - 106 mmol/L 102    Total CO2 20 - 29 mmol/L 22    Calcium 8.7 - 10.2 mg/dL 9.2    Total Protein 6.0 - 8.5 g/dL 6.7    Albumin 3.5 - 5.5 g/dL 4.4    Globulin 1.5 - 4.5 g/dL 2.3    A/G Ratio 1.2 - 2.2 1.9    Total Bilirubin 0.0 - 1.2 mg/dL 0.4    Alkaline Phosphatase 39 - 117 IU/L 81    AST (SGOT) 0 - 40 IU/L 35    ALT (SGPT) 0 - 32 IU/L 39 Abnormally  high     Resulting Agency  [facility]      Narrative   Performed by: [facility]   Performed at:  01 - [facility] [address]  [id number]  : James Cavazos PhD, Phone:  [phone]  Patient Fasting:  Y       Specimen Collected: 05/15/19 08:45 Last Resulted: 05/16/19 18:35           ISSA Comprehensive Plus Profile   Order: [id number]   Status:  Final result   Visible to patient:  Yes (MyChart)    Ref Range & Units 11d ago   Anti-DNA (DS) Ab Qn 0 - 9 IU/mL 2    Comment:                                    Negative      <5                                      Equivocal  5 - 9                                      Positive      >9    RNP Antibodies 0.0 - 0.9 AI 1.8 Abnormally high     Kuhn Antibodies 0.0 - 0.9 AI <0.2    Kuhn/RNP Antibodies 0.0 - 0.9 AI <0.2    Antiscleroderma-70 Antibodies 0.0 - 0.9 AI 0.2    LUZ SSA (RO) Ab 0.0 - 0.9 AI <0.2    LUZ SSB (LA) Ab 0.0 - 0.9 AI <0.2    Antichromatin Antibodies 0.0 - 0.9 AI <0.2    Antiribosomal P Antibodies 0.0 - 0.9 AI <0.2    MARK-1 IgG 0.0 - 0.9 AI <0.2    Anti-Centromere B Antibodies 0.0 - 0.9 AI <0.2    See below:  Comment    Comment: Autoantibody                       Disease Association   ____________________________________________________________                           Condition                  Frequency   _____________________   ________________________   _________   Antinuclear Antibody,    SLE, mixed connective   Direct (ISSA-D)           tissue diseases   _____________________   ________________________   _________   dsDNA                    SLE                        40 - 60%   _____________________   ________________________   _________   Chromatin                Drug induced SLE                90%                            SLE                        48 - 97%   _____________________   ________________________   _________   SSA (Ro)                 SLE                        25 - 35%                            Sjogren's Syndrome          40 - 70%                             Lupus                 100%   _____________________   ________________________   _________   SSB (La)                 SLE                             10%                            Sjogren's Syndrome              30%   _____________________   _______________________    _________   Sm (anti-Kuhn)          SLE                        15 - 30%   _____________________   _______________________    _________   RNP                      Mixed Connective Tissue                            Disease                         95%   (U1 nRNP,                SLE                        30 - 50%   anti-ribonucleoprotein)  Polymyositis and/or                            Dermatomyositis                 20%   _____________________   ________________________   _________   Scl-70 (antiDNA          Scleroderma (diffuse)      20 - 35%   topoisomerase)           Crest                           13%   _____________________   ________________________   _________   Janki-1                     Polymyositis and/or                            Dermatomyositis            20 - 40%   _____________________   ________________________   _________   Centromere B             Scleroderma - Crest                            variant                         80%   _____________________   ________________________   _________   Ribosomal P              SLE                        10 - 20%    Resulting Agency  [facility]      Narrative   Performed by: [facility]   Performed at:   - [facility] [address]  [id number]  : James Cavazos PhD, Phone:  [phone]  Patient Fasting:  Y       Specimen Collected: 05/15/19 08:45 Last Resulted: 19 18:35           Contains abnormal data Lipid Panel   Order: [id number]   Status:  Final result   Visible to patient:  Yes (MyChart)    Ref Range & Units 11d ago   Total Cholesterol 100 - 199 mg/dL 189    Triglycerides 0 - 149 mg/dL 126    HDL Cholesterol  >39 mg/dL 49    VLDL Cholesterol 5 - 40 mg/dL 25    LDL Cholesterol  0 - 99 mg/dL 115 Abnormally high     Resulting Agency  [facility]      Narrative   Performed by: [facility]   Performed at:  [facility]  [id number]  : James Cavazos PhD, Phone:  [phone]  Patient Fasting:  Y       Specimen Collected: 05/15/19 08:45 Last Resulted: 05/16/19 18:35           Measles / Mumps / Rubella Immunity   Order: [id number]   Status:  Final result   Visible to patient:  Yes (MyChart)    Ref Range & Units 11d ago   Rubella Antibodies, IgG Immune >0.99 index 1.00    Comment:                                 Non-immune       <0.90                                   Equivocal  0.90 - 0.99                                   Immune           >0.99    Rubeola IgG Immune >29.9 AU/mL >300.0    Comment:                                  Negative        <25.0                                    Equivocal 25.0 - 29.9                                    Positive        >29.9   Presence of antibodies to Rubeola is presumptive evidence   of immunity except when acute infection is suspected.    Mumps IgG Immune >10.9 AU/mL >300.0    Comment:                                 Negative         <9.0                                   Equivocal  9.0 - 10.9                                   Positive        >10.9   A positive result generally indicates past exposure to   Mumps virus or previous vaccination.    Resulting Agency  [facility]      Narrative   Performed by: [facility]   Performed at:  [address]  [address]  [id number]  : James Cavazos PhD, Phone:  [phone]  Patient Fasting:  Y       Specimen Collected: 05/15/19 08:45 Last Resulted: 05/16/19 18:35              Hemoglobin A1c   Order: [id number]   Status:  Final result   Visible to patient:  Yes (MyChart)    Ref Range & Units 11d ago   Hemoglobin A1C 4.8 - 5.6 % 5.4    Comment:          Prediabetes: 5.7 - 6.4             Diabetes: >6.4            Glycemic control for adults with diabetes: <7.0    Resulting Agency  [facility]      Narrative   Performed by: [facility]   Performed at:  [address]  [address]  [id number]  : James Cavazos PhD, Phone:  [phone]  Patient Fasting:  Y       Specimen Collected: 05/15/19 08:45 Last Resulted: 05/16/19 18:35         TSH   Order: [id number]   Status:  Final result   Visible to patient:  Yes (MyChart)    Ref Range & Units 11d ago   TSH 0.450 - 4.500 uIU/mL 4.340    Resulting Agency  [facility]      Narrative   Performed by: [facility]   Performed at:  01  [address]  [id number]  : James Cavazos PhD, Phone:  [phone]  Patient Fasting:  Y       Specimen Collected: 05/15/19 08:45 Last Resulted: 05/16/19 18:35           Rheumatoid Factor   Order: [id number]   Status:  Final result   Visible to patient:  Yes (MyChart)    Ref Range & Units 11d ago   RA Latex Turbid 0.0 - 13.9 IU/mL <10.0    Resulting Agency  [facility]      Narrative   Performed by: [facility]   Performed at:  01  [address]  [id number]  : James Cavazos PhD, Phone:  [phone]  Patient Fasting:  Y       Specimen Collected: 05/15/19 08:45 Last Resulted: 05/16/19 18:35           Vitamin D 25 Hydroxy   Order: [id number]   Status:  Final result   Visible to patient:  Yes (MyChart)    Ref Range & Units 11d ago   25 Hydroxy, Vitamin D 30.0 - 100.0 ng/mL 50.6            Sedimentation Rate   Order: [id number]   Status:  Final result   Visible to patient:  Yes (MyChart)    Ref Range & Units 11d ago   Sed Rate 0 - 40 mm/hr 4    Resulting Agency  [facility]      Narrative   Performed by: [facility]   Performed at:  01  [address]  [id number]  : James Cavazos PhD, Phone:  [phone]  Patient Fasting:  Y       Specimen Collected: 05/15/19 08:45 Last Resulted: 05/16/19 18:35          Contains abnormal data C-reactive Protein   Order: [id number]   Status:  Final result   Visible to patient:  Yes (MyChart)    Ref Range & Units 11d ago   C-Reactive Protein 0.0 - 4.9 mg/L 12.4 Abnormally high     Resulting Agency  [facility]      Narrative   Performed by: [facility]   Performed at:  01 - [facility] [address]  [id number]  : James Cavazos PhD, Phone:  [phone]  Patient Fasting:  Y       Specimen Collected: 05/15/19 08:45 Last Resulted: 05/16/19 18:35           Nuclear Antigen Antibody, IFA   Order: [id number]   Status:  Final result   Visible to patient:  Yes (MyChart)   Component 11d ago   ISSA Negative    Comment:                                      Negative   <1:80                                        Borderline  1:80                                        Positive   >1:80    Resulting Agency [facility]      Narrative   Performed by: [facility]   Performed at:  01 - [facility] [address]  [id number]  : James Cavazos PhD, Phone:  [phone]  Patient Fasting:  Y       Specimen Collected: 05/15/19 08:45 Last Resulted: 05/16/19 18:35                   ASSESSMENT/PLAN  Gino was seen today for annual exam.    Diagnoses and all orders for this visit:    Annual physical exam  -     CBC & Differential  -     Comprehensive Metabolic Panel  -     TSH  -     POC Urinalysis Dipstick, Automated  -     Cancel: MicroAlbumin, Urine, Random - Urine, Clean Catch  -     Hemoglobin A1c  -     Lipid Panel  -     Vitamin D 25 Hydroxy  -     POCT microalbumin    Arthralgia, unspecified joint  -     Rheumatoid factor  -     C-reactive protein  -     Sedimentation Rate  -     ISSA by IFA, Reflex 9-biomarkers profile    Abnormal antibody titer  -     Measles / Mumps / Rubella Immunity    Essential hypertension  Comments:  Has not had bp meds this morning. Cont propranolol.  DASH diet reviewed; written instructions provided. Needs low sodium diet <1500mg  day, heart healthy foods and regular exercise.     History of breast lump  -     Cancel: Mammo diagnostic digital tomosynthesis left w CAD; Future  -     Cancel: US breast left complete; Future  -     Mammo diagnostic digital tomosynthesis left w CAD; Future  -     US breast left complete; Future    Breast cancer screening  -     Mammo screening digital tomosynthesis bilateral w CAD; Future    Positive sm/RNP antibody  Pt to schedule FU with rheumatology followed at [facility].    Elevated C-reactive protein (CRP)  Will recheck level next visit. ? Error or related to inflammatory state. Normal in august 2018      ASCVD calc 2.4%.  Nutrition and activity goals reviewed including: mainly water to drink, limit white flour/processed sugar; increase high protein, high fiber carbs, good breakfast, working toward 150 mins cardio per week, resistance training 2x/week.  Will fu on las; had to [facility].  Reviewed shingrex vaccine recommendation to check with her pharmacy.  I discussed the patients findings and my recommendations with patient.  I will contact patient regarding test results and provide instructions regarding any necessary changes in plan of care.  Regular annual physical exams encouraged.    Patient voiced understanding of all instructions and denied further questions.    FOLLOW-UP  Return in about 6 months (around 11/13/2019), or if symptoms worsen or fail to improve, for Recheck BP.  Recheck crp, BP  Electronically signed by:    CLAY Bagley  05/13/2019

## 2019-05-16 LAB
25(OH)D3+25(OH)D2 SERPL-MCNC: 50.6 NG/ML (ref 30–100)
ALBUMIN SERPL-MCNC: 4.4 G/DL (ref 3.5–5.5)
ALBUMIN/GLOB SERPL: 1.9 {RATIO} (ref 1.2–2.2)
ALP SERPL-CCNC: 81 IU/L (ref 39–117)
ALT SERPL-CCNC: 39 IU/L (ref 0–32)
AMBIG ABBREV CMP14 DEFAULT: NORMAL
AMBIG ABBREV LP DEFAULT: NORMAL
ANA TITR SER IF: NEGATIVE {TITER}
AST SERPL-CCNC: 35 IU/L (ref 0–40)
BASOPHILS # BLD AUTO: 0 X10E3/UL (ref 0–0.2)
BASOPHILS NFR BLD AUTO: 0 %
BILIRUB SERPL-MCNC: 0.4 MG/DL (ref 0–1.2)
BUN SERPL-MCNC: 13 MG/DL (ref 6–24)
BUN/CREAT SERPL: 16 (ref 9–23)
CALCIUM SERPL-MCNC: 9.2 MG/DL (ref 8.7–10.2)
CENTROMERE B AB SER-ACNC: <0.2 AI (ref 0–0.9)
CHLORIDE SERPL-SCNC: 102 MMOL/L (ref 96–106)
CHOLEST SERPL-MCNC: 189 MG/DL (ref 100–199)
CHROMATIN AB SERPL-ACNC: <0.2 AI (ref 0–0.9)
CO2 SERPL-SCNC: 22 MMOL/L (ref 20–29)
CREAT SERPL-MCNC: 0.83 MG/DL (ref 0.57–1)
CRP SERPL-MCNC: 12.4 MG/L (ref 0–4.9)
DSDNA AB SER-ACNC: 2 IU/ML (ref 0–9)
ENA JO1 AB SER-ACNC: <0.2 AI (ref 0–0.9)
ENA RNP AB SER-ACNC: 1.8 AI (ref 0–0.9)
ENA SCL70 AB SER-ACNC: 0.2 AI (ref 0–0.9)
ENA SM AB SER-ACNC: <0.2 AI (ref 0–0.9)
ENA SM+RNP AB SER-ACNC: <0.2 AI (ref 0–0.9)
ENA SS-A AB SER-ACNC: <0.2 AI (ref 0–0.9)
ENA SS-B AB SER-ACNC: <0.2 AI (ref 0–0.9)
EOSINOPHIL # BLD AUTO: 0.4 X10E3/UL (ref 0–0.4)
EOSINOPHIL NFR BLD AUTO: 6 %
ERYTHROCYTE [DISTWIDTH] IN BLOOD BY AUTOMATED COUNT: 14.3 % (ref 12.3–15.4)
ERYTHROCYTE [SEDIMENTATION RATE] IN BLOOD BY WESTERGREN METHOD: 4 MM/HR (ref 0–40)
GLOBULIN SER CALC-MCNC: 2.3 G/DL (ref 1.5–4.5)
GLUCOSE SERPL-MCNC: 86 MG/DL (ref 65–99)
HBA1C MFR BLD: 5.4 % (ref 4.8–5.6)
HCT VFR BLD AUTO: 38.9 % (ref 34–46.6)
HDLC SERPL-MCNC: 49 MG/DL
HGB BLD-MCNC: 13 G/DL (ref 11.1–15.9)
IMM GRANULOCYTES # BLD AUTO: 0 X10E3/UL (ref 0–0.1)
IMM GRANULOCYTES NFR BLD AUTO: 0 %
LDLC SERPL CALC-MCNC: 115 MG/DL (ref 0–99)
LYMPHOCYTES # BLD AUTO: 2 X10E3/UL (ref 0.7–3.1)
LYMPHOCYTES NFR BLD AUTO: 29 %
Lab: ABNORMAL
MCH RBC QN AUTO: 31 PG (ref 26.6–33)
MCHC RBC AUTO-ENTMCNC: 33.4 G/DL (ref 31.5–35.7)
MCV RBC AUTO: 93 FL (ref 79–97)
MEV IGG SER IA-ACNC: >300 AU/ML
MONOCYTES # BLD AUTO: 0.4 X10E3/UL (ref 0.1–0.9)
MONOCYTES NFR BLD AUTO: 5 %
MUV IGG SER IA-ACNC: >300 AU/ML
NEUTROPHILS # BLD AUTO: 4.1 X10E3/UL (ref 1.4–7)
NEUTROPHILS NFR BLD AUTO: 60 %
PLATELET # BLD AUTO: 262 X10E3/UL (ref 150–379)
POTASSIUM SERPL-SCNC: 4.7 MMOL/L (ref 3.5–5.2)
PROT SERPL-MCNC: 6.7 G/DL (ref 6–8.5)
RBC # BLD AUTO: 4.19 X10E6/UL (ref 3.77–5.28)
RHEUMATOID FACT SERPL-ACNC: <10 IU/ML (ref 0–13.9)
RIBOSOMAL P AB SER-ACNC: <0.2 AI (ref 0–0.9)
RUBV IGG SERPL IA-ACNC: 1 INDEX
SODIUM SERPL-SCNC: 138 MMOL/L (ref 134–144)
TRIGL SERPL-MCNC: 126 MG/DL (ref 0–149)
TSH SERPL DL<=0.005 MIU/L-ACNC: 4.34 UIU/ML (ref 0.45–4.5)
VLDLC SERPL CALC-MCNC: 25 MG/DL (ref 5–40)
WBC # BLD AUTO: 7 X10E3/UL (ref 3.4–10.8)

## 2019-05-19 RX ORDER — LEVOMEFOLATE/ALGAL OIL 15-90.314
CAPSULE ORAL
Qty: 30 CAPSULE | Refills: 0 | Status: SHIPPED | OUTPATIENT
Start: 2019-05-19 | End: 2020-06-26 | Stop reason: SDUPTHER

## 2019-06-06 NOTE — TELEPHONE ENCOUNTER
PT IS CALLING TO ASK FOR A REFILL ON PROPLANOLOL, HER INSTRUCTIONS SHOULD SAY 1 TO 2 TABLETS 2X DAILY, PER PT. PT'S PHARMACY IS [facility] IN [facility]. PT CAN BE REACHED [phone],

## 2019-06-07 RX ORDER — PROPRANOLOL HYDROCHLORIDE 10 MG/1
10 TABLET ORAL 2 TIMES DAILY
Qty: 60 TABLET | Refills: 11 | Status: SHIPPED | OUTPATIENT
Start: 2019-06-07 | End: 2020-07-14 | Stop reason: SDUPTHER

## 2019-06-12 ENCOUNTER — HOSPITAL ENCOUNTER (OUTPATIENT)
Dept: MAMMOGRAPHY | Facility: HOSPITAL | Age: 49
Discharge: HOME | End: 2019-06-12
Admitting: NURSE PRACTITIONER

## 2019-06-12 DIAGNOSIS — Z87.898 HISTORY OF BREAST LUMP: ICD-10-CM

## 2019-06-12 PROCEDURE — 77065 DX MAMMO INCL CAD UNI: CPT | Performed by: RADIOLOGY

## 2019-06-12 PROCEDURE — 77065 DX MAMMO INCL CAD UNI: CPT

## 2019-07-08 DIAGNOSIS — F41.9 ANXIETY: ICD-10-CM

## 2019-07-15 RX ORDER — ALPRAZOLAM 0.5 MG/1
TABLET ORAL
Qty: 60 TABLET | Refills: 0 | OUTPATIENT
Start: 2019-07-15

## 2019-07-18 ENCOUNTER — OFFICE VISIT (OUTPATIENT)
Dept: INTERNAL MEDICINE | Facility: CLINIC | Age: 49
End: 2019-07-18

## 2019-07-18 VITALS
WEIGHT: 193 LBS | BODY MASS INDEX: 34.2 KG/M2 | HEIGHT: 63 IN | DIASTOLIC BLOOD PRESSURE: 78 MMHG | TEMPERATURE: 98 F | SYSTOLIC BLOOD PRESSURE: 142 MMHG | OXYGEN SATURATION: 99 % | HEART RATE: 82 BPM

## 2019-07-18 DIAGNOSIS — Z79.899 HIGH RISK MEDICATION USE: ICD-10-CM

## 2019-07-18 DIAGNOSIS — M79.89 SWELLING OF BOTH HANDS: ICD-10-CM

## 2019-07-18 DIAGNOSIS — M25.50 ARTHRALGIA, UNSPECIFIED JOINT: ICD-10-CM

## 2019-07-18 DIAGNOSIS — Z86.69 HISTORY OF MIGRAINE: ICD-10-CM

## 2019-07-18 DIAGNOSIS — I10 ESSENTIAL HYPERTENSION: ICD-10-CM

## 2019-07-18 DIAGNOSIS — F41.1 GAD (GENERALIZED ANXIETY DISORDER): Primary | ICD-10-CM

## 2019-07-18 PROCEDURE — 99214 OFFICE O/P EST MOD 30 MIN: CPT | Performed by: NURSE PRACTITIONER

## 2019-07-18 RX ORDER — METOCLOPRAMIDE 10 MG/1
10 TABLET ORAL 4 TIMES DAILY PRN
Qty: 30 TABLET | Refills: 5 | Status: SHIPPED | OUTPATIENT
Start: 2019-07-18 | End: 2020-01-24

## 2019-07-18 RX ORDER — ALPRAZOLAM 0.25 MG/1
0.25 TABLET ORAL NIGHTLY PRN
Qty: 30 TABLET | Refills: 0 | Status: SHIPPED | OUTPATIENT
Start: 2019-07-18

## 2019-07-18 RX ORDER — ALPRAZOLAM 0.5 MG/1
TABLET ORAL
Qty: 60 TABLET | Refills: 0 | Status: CANCELLED | OUTPATIENT
Start: 2019-07-18

## 2019-07-18 RX ORDER — HYDROCHLOROTHIAZIDE 12.5 MG/1
12.5 TABLET ORAL DAILY
Qty: 30 TABLET | Refills: 2 | Status: SHIPPED | OUTPATIENT
Start: 2019-07-18 | End: 2019-10-06 | Stop reason: SDUPTHER

## 2019-07-18 NOTE — PATIENT INSTRUCTIONS
"DASH Eating Plan  DASH stands for \"Dietary Approaches to Stop Hypertension.\" The DASH eating plan is a healthy eating plan that has been shown to reduce high blood pressure (hypertension). It may also reduce your risk for type 2 diabetes, heart disease, and stroke. The DASH eating plan may also help with weight loss.  What are tips for following this plan?  General guidelines  · Avoid eating more than 2,300 mg (milligrams) of salt (sodium) a day. If you have hypertension, you may need to reduce your sodium intake to 1,500 mg a day.  · Limit alcohol intake to no more than 1 drink a day for nonpregnant women and 2 drinks a day for men. One drink equals 12 oz of beer, 5 oz of wine, or 1½ oz of hard liquor.  · Work with your health care provider to maintain a healthy body weight or to lose weight. Ask what an ideal weight is for you.  · Get at least 30 minutes of exercise that causes your heart to beat faster (aerobic exercise) most days of the week. Activities may include walking, swimming, or biking.  · Work with your health care provider or diet and nutrition specialist (dietitian) to adjust your eating plan to your individual calorie needs.  Reading food labels  · Check food labels for the amount of sodium per serving. Choose foods with less than 5 percent of the Daily Value of sodium. Generally, foods with less than 300 mg of sodium per serving fit into this eating plan.  · To find whole grains, look for the word \"whole\" as the first word in the ingredient list.  Shopping  · Buy products labeled as \"low-sodium\" or \"no salt added.\"  · Buy fresh foods. Avoid canned foods and premade or frozen meals.  Cooking  · Avoid adding salt when cooking. Use salt-free seasonings or herbs instead of table salt or sea salt. Check with your health care provider or pharmacist before using salt substitutes.  · Do not gomez foods. Cook foods using healthy methods such as baking, boiling, grilling, and broiling instead.  · Cook with " heart-healthy oils, such as olive, canola, soybean, or sunflower oil.  Meal planning    · Eat a balanced diet that includes:  ? 5 or more servings of fruits and vegetables each day. At each meal, try to fill half of your plate with fruits and vegetables.  ? Up to 6-8 servings of whole grains each day.  ? Less than 6 oz of lean meat, poultry, or fish each day. A 3-oz serving of meat is about the same size as a deck of cards. One egg equals 1 oz.  ? 2 servings of low-fat dairy each day.  ? A serving of nuts, seeds, or beans 5 times each week.  ? Heart-healthy fats. Healthy fats called Omega-3 fatty acids are found in foods such as flaxseeds and coldwater fish, like sardines, salmon, and mackerel.  · Limit how much you eat of the following:  ? Canned or prepackaged foods.  ? Food that is high in trans fat, such as fried foods.  ? Food that is high in saturated fat, such as fatty meat.  ? Sweets, desserts, sugary drinks, and other foods with added sugar.  ? Full-fat dairy products.  · Do not salt foods before eating.  · Try to eat at least 2 vegetarian meals each week.  · Eat more home-cooked food and less restaurant, buffet, and fast food.  · When eating at a restaurant, ask that your food be prepared with less salt or no salt, if possible.  What foods are recommended?  The items listed may not be a complete list. Talk with your dietitian about what dietary choices are best for you.  Grains  Whole-grain or whole-wheat bread. Whole-grain or whole-wheat pasta. Brown rice. Oatmeal. Quinoa. Bulgur. Whole-grain and low-sodium cereals. Lanie bread. Low-fat, low-sodium crackers. Whole-wheat flour tortillas.  Vegetables  Fresh or frozen vegetables (raw, steamed, roasted, or grilled). Low-sodium or reduced-sodium tomato and vegetable juice. Low-sodium or reduced-sodium tomato sauce and tomato paste. Low-sodium or reduced-sodium canned vegetables.  Fruits  All fresh, dried, or frozen fruit. Canned fruit in natural juice (without  added sugar).  Meat and other protein foods  Skinless chicken or turkey. Ground chicken or turkey. Pork with fat trimmed off. Fish and seafood. Egg whites. Dried beans, peas, or lentils. Unsalted nuts, nut butters, and seeds. Unsalted canned beans. Lean cuts of beef with fat trimmed off. Low-sodium, lean deli meat.  Dairy  Low-fat (1%) or fat-free (skim) milk. Fat-free, low-fat, or reduced-fat cheeses. Nonfat, low-sodium ricotta or cottage cheese. Low-fat or nonfat yogurt. Low-fat, low-sodium cheese.  Fats and oils  Soft margarine without trans fats. Vegetable oil. Low-fat, reduced-fat, or light mayonnaise and salad dressings (reduced-sodium). Canola, safflower, olive, soybean, and sunflower oils. Avocado.  Seasoning and other foods  Herbs. Spices. Seasoning mixes without salt. Unsalted popcorn and pretzels. Fat-free sweets.  What foods are not recommended?  The items listed may not be a complete list. Talk with your dietitian about what dietary choices are best for you.  Grains  Baked goods made with fat, such as croissants, muffins, or some breads. Dry pasta or rice meal packs.  Vegetables  Creamed or fried vegetables. Vegetables in a cheese sauce. Regular canned vegetables (not low-sodium or reduced-sodium). Regular canned tomato sauce and paste (not low-sodium or reduced-sodium). Regular tomato and vegetable juice (not low-sodium or reduced-sodium). Pickles. Olives.  Fruits  Canned fruit in a light or heavy syrup. Fried fruit. Fruit in cream or butter sauce.  Meat and other protein foods  Fatty cuts of meat. Ribs. Fried meat. Jackson. Sausage. Bologna and other processed lunch meats. Salami. Fatback. Hotdogs. Bratwurst. Salted nuts and seeds. Canned beans with added salt. Canned or smoked fish. Whole eggs or egg yolks. Chicken or turkey with skin.  Dairy  Whole or 2% milk, cream, and half-and-half. Whole or full-fat cream cheese. Whole-fat or sweetened yogurt. Full-fat cheese. Nondairy creamers. Whipped toppings.  Processed cheese and cheese spreads.  Fats and oils  Butter. Stick margarine. Lard. Shortening. Ghee. Jackson fat. Tropical oils, such as coconut, palm kernel, or palm oil.  Seasoning and other foods  Salted popcorn and pretzels. Onion salt, garlic salt, seasoned salt, table salt, and sea salt. Worcestershire sauce. Tartar sauce. Barbecue sauce. Teriyaki sauce. Soy sauce, including reduced-sodium. Steak sauce. Canned and packaged gravies. Fish sauce. Oyster sauce. Cocktail sauce. Horseradish that you find on the shelf. Ketchup. Mustard. Meat flavorings and tenderizers. Bouillon cubes. Hot sauce and Tabasco sauce. Premade or packaged marinades. Premade or packaged taco seasonings. Relishes. Regular salad dressings.  Where to find more information:  · National Heart, Lung, and Blood [place]: www.nhlbi.nih.gov  · American Heart Association: www.heart.org  Summary  · The DASH eating plan is a healthy eating plan that has been shown to reduce high blood pressure (hypertension). It may also reduce your risk for type 2 diabetes, heart disease, and stroke.  · With the DASH eating plan, you should limit salt (sodium) intake to 2,300 mg a day. If you have hypertension, you may need to reduce your sodium intake to 1,500 mg a day.  · When on the DASH eating plan, aim to eat more fresh fruits and vegetables, whole grains, lean proteins, low-fat dairy, and heart-healthy fats.  · Work with your health care provider or diet and nutrition specialist (dietitian) to adjust your eating plan to your individual calorie needs.  This information is not intended to replace advice given to you by your health care provider. Make sure you discuss any questions you have with your health care provider.  Document Released: 12/06/2012 Document Revised: 12/11/2017 Document Reviewed: 12/11/2017  [employer] Patient Education © 2019 [employer]

## 2019-07-18 NOTE — PROGRESS NOTES
Chief Complaint   Patient presents with   • Anxiety     med refill       History of Present Illness  49 y.o.female presents for anxiety follow up.  General anxiety disorder onset years; takes wellbutrin and propranolol regularly with rare xanax for breakthru anxiety only 1 prescription for such in last year. aksing for refills.  Has increased stress recently.    Recent positive RNP antibody with negative ISSA. Has c/o generalized joint pain arthalgias. Seen at [facility] and was told needed to have FU lab to see if if still positive but no test noted for rnp antibodies.    C/o hand swelling argelia hands fingers onset several weeks. No chest pain or shortness of air. No leg swelling. Voiding without difficulty.  No prior hx of htn diagnosis but bp was up some last visit. No prior bp meds. Positive family hx htn.    Hx migraines; takes propranolol and prn reglan. Asking for refill.     Review of Systems   Constitutional: Negative for chills, fatigue and fever.   Eyes: Negative for blurred vision and visual disturbance.   Respiratory: Negative for shortness of breath.    Cardiovascular: Negative for chest pain, palpitations and leg swelling.        Swelling argelia hands   Genitourinary: Negative for difficulty urinating.   Musculoskeletal: Positive for arthralgias, joint swelling and myalgias.   Neurological: Negative for dizziness, light-headedness and headache.   Psychiatric/Behavioral: Positive for sleep disturbance and stress. Negative for self-injury, suicidal ideas and depressed mood. The patient is nervous/anxious.          [facility]  The following portions of the patient's history were reviewed and updated as appropriate: allergies, current medications, past family history, past medical history, past social history, past surgical history and problem list.     Social hx:  Illicit drug use; denies. Uses cbd oil.  Tobacco former smoker  Alcohol none    Past Medical History:   Diagnosis Date   • Abdominal pain    •  Abdominal pain, RUQ     Check cmp. refer to [facility] u/s and gen surg eval d/t cholelithiasis noted on CT of abd/pelvis donein ER    • Acute bronchitis     Take cefdinir and medrol dose pack as directed. Use otc mucinex. Continue to rest and push fluids. Call or rtc if no better. Gave phenergan w/ codeine cough syrup 5 ml po prn #120ml, no RF per Dr. Cesar   • Acute pharyngitis     Check for monod/t exposure/ Pt will restart flovent as used for esophagitis. If no improvement take the medrol dose pack she was given at least visit   • Acute sinusitis    • Acute upper respiratory infection    • Anxiety    • Body aches    • Cholelithiasis     Reviewed CT of adb/pelvis from ER  showed cholelithiasis. Will refer for gen surgery eval and [facility] u/s   • Chronic pain disorder    • Cough    • Eosinophilic esophagitis    • Extremity pain    • Gestational diabetes    • Headache    • Influenza    • Labyrinthitis    • Low back pain    • Lumbar strain    • Lumbosacral disc disease    • Neck pain    • Right hip pain    • Shingles    • Strain of thoracic region    • Viral infection    • Viral pharyngitis       Past Surgical History:   Procedure Laterality Date   • BREAST BIOPSY Left 10/2018    Benign STEREO    •  SECTION      97, 00, 04, 12   • DILATATION AND CURETTAGE     • ENDOSCOPY     • OOPHORECTOMY Right 2017   • WISDOM TOOTH EXTRACTION        Allergies   Allergen Reactions   • Maxalt [Rizatriptan] Anaphylaxis   • Sulfa Antibiotics Rash   • Sumatriptan Anaphylaxis   • Zolmitriptan Anaphylaxis   • Labetalol Rash      Family History   Problem Relation Age of Onset   • Arthritis Mother    • Hypertension Mother    • Thyroid disease Mother    • Arthritis Father    • Hypertension Father    • Heart disease Father    • Heart attack Other    • Arthritis Other    • Hypertension Other    • Migraines Other    • Stroke Other    • Breast cancer Other 31   • Ovarian cancer Cousin 42   • Heart failure Paternal  "Grandmother    • Endometrial cancer Neg Hx    • Colon cancer Neg Hx             Current Outpatient Medications:   •  albuterol sulfate  (90 Base) MCG/ACT inhaler, Inhale 2 puffs Every 4 (Four) Hours As Needed for Wheezing., Disp: 1 inhaler, Rfl: 0  •  ALPRAZolam (XANAX) 0.5 MG tablet, One po bid prn, Disp: 60 tablet, Rfl: 0  •  buPROPion XL (WELLBUTRIN XL) 150 MG 24 hr tablet, take 1 tablet by mouth every morning, Disp: 30 tablet, Rfl: 5  •  L-Methylfolate-Algae 15-90.314 MG capsule, take 1 capsule by mouth daily, Disp: 30 capsule, Rfl: 0  •  metoclopramide (REGLAN) 10 MG tablet, Take  by mouth., Disp: , Rfl:   •  Multiple Vitamins-Minerals (MULTIVITAMIN WITH MINERALS) tablet tablet, Take 1 tablet by mouth Daily., Disp: , Rfl:   •  ondansetron (ZOFRAN) 4 MG tablet, take 1 tablet by mouth every 8 hours if needed for nausea and vomiting, Disp: 14 tablet, Rfl: 1  •  promethazine (PHENERGAN) 25 MG tablet, Take 1 tablet by mouth Every 8 (Eight) Hours As Needed for Nausea or Vomiting., Disp: 21 tablet, Rfl: 0  •  propranolol (INDERAL) 10 MG tablet, Take 1 tablet by mouth 2 (Two) Times a Day., Disp: 60 tablet, Rfl: 11  •  vitamin D (ERGOCALCIFEROL) 56259 units capsule capsule, Take 1 capsule by mouth 1 (One) Time Per Week., Disp: , Rfl: 0    VITALS:  /78   Pulse 82   Temp 98 °F (36.7 °C)   Ht 160 cm (63\")   Wt 87.5 kg (193 lb)   SpO2 99%   BMI 34.19 kg/m²   Physical Exam   Constitutional: She is oriented to person, place, and time. She appears well-developed and well-nourished. No distress.   HENT:   Head: Normocephalic.   Mouth/Throat: Oropharynx is clear and moist.   Eyes: Pupils are equal, round, and reactive to light.   Neck: Normal range of motion. Neck supple.   Cardiovascular: Normal rate, regular rhythm and normal heart sounds.   Generalized swelling of bilateral hands/fingers.   Pulmonary/Chest: Effort normal and breath sounds normal. No respiratory distress.   Musculoskeletal: Normal range of " motion.   Normal ROM all major joints; Generalized swelling of bilateral hands/fingers.   Lymphadenopathy:     She has no cervical adenopathy.   Neurological: She is alert and oriented to person, place, and time.   Skin: Skin is warm and dry. Capillary refill takes less than 2 seconds. No rash noted.   Psychiatric: She has a normal mood and affect. Her behavior is normal.   Vitals reviewed.      LABS  Pending   Blood and UDS ordered.    ASSESSMENT/PLAN  Gino was seen today for anxiety.    Diagnoses and all orders for this visit:    PAIGE (generalized anxiety disorder)  -     ALPRAZolam (XANAX) 0.25 MG tablet; Take 1 tablet by mouth At Night As Needed for Anxiety or Sleep. One po bid prn    Essential hypertension  -     hydrochlorothiazide (HYDRODIURIL) 12.5 MG tablet; Take 1 tablet by mouth Daily.    Swelling of both hands  -     hydrochlorothiazide (HYDRODIURIL) 12.5 MG tablet; Take 1 tablet by mouth Daily.    History of migraine  -     metoclopramide (REGLAN) 10 MG tablet; Take 1 tablet by mouth 4 (Four) Times a Day As Needed (nausea associated migraine).    Arthralgia, unspecified joint  -     ISSA Comprehensive Plus Profile    High risk medication use  -     Urine Drug Screen - Urine, Clean Catch; Future      Counseled patient regarding multimodal approach with healthy nutrition, healthy sleep, regular physical activity, social activities, counseling, and medications.  Reviewed medication options and common side effects. Cont wellbutrin and propranolol. Will only prescribe xanax once a year to use on an as needed basis.  If needs more frequently will need to add other anxiety med such as buspar or SSRI; or refer to behavior health.  Pt verbalized understanding.  Advised pt of potential side effects of reglan such as tardive dyskinesia, which can be permanent. Advised to use reglan sparingly and not routinely.    Patient has completed a prescribing agreement detailing terms of continued prescribing of controlled  substances, including monitoring CHITRA reports, urine drug screening, and pill counts if necessary.  Patient is aware that inappropriate use will result in cessation of prescribing such medications.  As part of patient's treatment plan I am prescribing a controlled substance.  The patient has been made aware of the appropriate use of such medications, including potential risk of somnolence, limited ability to drive and/or work safely, and potential for dependence and/or overdose.  It has also been made clear that these medications are for use by this patient only, without concomitant use of alcohol or other substances, unless prescribed.  The patient has read and signed the [facility] Controlled Substance Contract.  I will continue to see patient for regular follow up appointments.  They are well controlled on their medication.  CHITRA is updated every 3 months. The patient is aware of the potential for addiction and dependence.    New dx of htn with last couple visit SBP >140.  Reviewed different htn medication options; pt wants to proceed with diuretic hctz. Should also help with swelling in hands.  Need low sodium diet <1500mg day, heart healthy diet exercise.    I discussed the patients findings and my recommendations with patient.  Patient was encouraged to keep me informed of any acute changes, lack of improvement, or any new concerning symptoms.    Patient voiced understanding of all instructions and denied further questions.      FOLLOW-UP  Return in about 6 months (around 1/18/2020), or if symptoms worsen or fail to improve.    Electronically signed by:    CLAY Bagley  07/18/2019

## 2019-07-22 ENCOUNTER — TELEPHONE (OUTPATIENT)
Dept: INTERNAL MEDICINE | Facility: CLINIC | Age: 49
End: 2019-07-22

## 2019-07-22 NOTE — TELEPHONE ENCOUNTER
----- Message from CLAY Munoz sent at 7/22/2019 11:09 AM EDT -----  Let pt know I wanted to make sure she was aware that reglan can have side effect of what is caused tardive dyskinesia which affects nervous system leading to involuntary movements of muscles nerves.  If occurs can be permanent.  I know she said she used reglan for her nausea r/t migraines.  Need to use sparingly and not routinely.

## 2019-08-05 ENCOUNTER — OFFICE VISIT (OUTPATIENT)
Dept: INTERNAL MEDICINE | Facility: CLINIC | Age: 49
End: 2019-08-05

## 2019-08-05 VITALS
BODY MASS INDEX: 34.2 KG/M2 | HEIGHT: 63 IN | OXYGEN SATURATION: 99 % | HEART RATE: 75 BPM | WEIGHT: 193 LBS | SYSTOLIC BLOOD PRESSURE: 140 MMHG | DIASTOLIC BLOOD PRESSURE: 80 MMHG

## 2019-08-05 DIAGNOSIS — M25.562 CHRONIC PAIN OF LEFT KNEE: ICD-10-CM

## 2019-08-05 DIAGNOSIS — R21 RASH: Primary | ICD-10-CM

## 2019-08-05 DIAGNOSIS — G89.29 CHRONIC PAIN OF LEFT KNEE: ICD-10-CM

## 2019-08-05 PROCEDURE — 99213 OFFICE O/P EST LOW 20 MIN: CPT | Performed by: NURSE PRACTITIONER

## 2019-08-05 RX ORDER — PREDNISONE 10 MG/1
TABLET ORAL
Qty: 1 EACH | Refills: 0 | Status: SHIPPED | OUTPATIENT
Start: 2019-08-05 | End: 2019-09-03

## 2019-08-05 RX ORDER — TRIAMCINOLONE ACETONIDE 1 MG/G
CREAM TOPICAL 2 TIMES DAILY
Qty: 45 G | Refills: 1 | Status: SHIPPED | OUTPATIENT
Start: 2019-08-05 | End: 2019-08-12

## 2019-08-05 NOTE — PROGRESS NOTES
Chief Complaint   Patient presents with   • Rash   • Edema     knee        History of Present Illness  49 y.o.female presents for rash and knee edema.  Had been on vacation last week; Thursday developed rash on chest itches now spreading down both arms. Was in sun a lot. No fever. Not sure if exposures was on vacation.  C/o left knee pain swelling; pain dull located on top and back of knee. Has seen ortho in past for steroid injection in knee. Chronic pain some worse over last couple weeks.      Review of Systems   Constitutional: Negative for chills and fever.   Respiratory: Negative for shortness of breath and wheezing.    Musculoskeletal: Positive for arthralgias and joint swelling. Negative for gait problem.   Skin: Positive for rash.       [facility]  The following portions of the patient's history were reviewed and updated as appropriate: allergies, current medications, past family history, past medical history, past social history, past surgical history and problem list.       Past Medical History:   Diagnosis Date   • Abdominal pain    • Abdominal pain, RUQ     Check cmp. refer to [facility] u/s and gen surg eval d/t cholelithiasis noted on CT of abd/pelvis donein ER 1/16   • Acute bronchitis     Take cefdinir and medrol dose pack as directed. Use otc mucinex. Continue to rest and push fluids. Call or rtc if no better. Gave phenergan w/ codeine cough syrup 5 ml po prn #120ml, no RF per Dr. Cesar   • Acute pharyngitis     Check for monod/t exposure/ Pt will restart flovent as used for esophagitis. If no improvement take the medrol dose pack she was given at least visit   • Acute sinusitis    • Acute upper respiratory infection    • Anxiety    • Body aches    • Cholelithiasis     Reviewed CT of adb/pelvis from ER 1/16 showed cholelithiasis. Will refer for gen surgery eval and [facility] u/s   • Chronic pain disorder    • Cough    • Eosinophilic esophagitis    • Extremity pain    • Gestational diabetes    • Headache    • Influenza     • Labyrinthitis    • Low back pain    • Lumbar strain    • Lumbosacral disc disease    • Neck pain    • Right hip pain    • Shingles    • Strain of thoracic region    • Viral infection    • Viral pharyngitis       Past Surgical History:   Procedure Laterality Date   • BREAST BIOPSY Left 10/2018    Benign STEREO    •  SECTION      97, 00, 04, 12   • DILATATION AND CURETTAGE     • ENDOSCOPY     • OOPHORECTOMY Right 2017   • WISDOM TOOTH EXTRACTION        Allergies   Allergen Reactions   • Maxalt [Rizatriptan] Anaphylaxis   • Sulfa Antibiotics Rash   • Sumatriptan Anaphylaxis   • Zolmitriptan Anaphylaxis   • Labetalol Rash      Family History   Problem Relation Age of Onset   • Arthritis Mother    • Hypertension Mother    • Thyroid disease Mother    • Arthritis Father    • Hypertension Father    • Heart disease Father    • Heart attack Other    • Arthritis Other    • Hypertension Other    • Migraines Other    • Stroke Other    • Breast cancer Other 31   • Ovarian cancer Cousin 42   • Heart failure Paternal Grandmother    • Endometrial cancer Neg Hx    • Colon cancer Neg Hx             Current Outpatient Medications:   •  albuterol sulfate  (90 Base) MCG/ACT inhaler, Inhale 2 puffs Every 4 (Four) Hours As Needed for Wheezing., Disp: 1 inhaler, Rfl: 0  •  ALPRAZolam (XANAX) 0.25 MG tablet, Take 1 tablet by mouth At Night As Needed for Anxiety or Sleep. One po bid prn, Disp: 30 tablet, Rfl: 0  •  buPROPion XL (WELLBUTRIN XL) 150 MG 24 hr tablet, take 1 tablet by mouth every morning, Disp: 30 tablet, Rfl: 5  •  hydrochlorothiazide (HYDRODIURIL) 12.5 MG tablet, Take 1 tablet by mouth Daily., Disp: 30 tablet, Rfl: 2  •  L-Methylfolate-Algae 15-90.314 MG capsule, take 1 capsule by mouth daily, Disp: 30 capsule, Rfl: 0  •  metoclopramide (REGLAN) 10 MG tablet, Take 1 tablet by mouth 4 (Four) Times a Day As Needed (nausea associated migraine)., Disp: 30 tablet, Rfl: 5  •  Multiple  "Vitamins-Minerals (MULTIVITAMIN WITH MINERALS) tablet tablet, Take 1 tablet by mouth Daily., Disp: , Rfl:   •  ondansetron (ZOFRAN) 4 MG tablet, take 1 tablet by mouth every 8 hours if needed for nausea and vomiting, Disp: 14 tablet, Rfl: 1  •  promethazine (PHENERGAN) 25 MG tablet, Take 1 tablet by mouth Every 8 (Eight) Hours As Needed for Nausea or Vomiting., Disp: 21 tablet, Rfl: 0  •  propranolol (INDERAL) 10 MG tablet, Take 1 tablet by mouth 2 (Two) Times a Day., Disp: 60 tablet, Rfl: 11  •  vitamin D (ERGOCALCIFEROL) 60019 units capsule capsule, Take 1 capsule by mouth 1 (One) Time Per Week., Disp: , Rfl: 0    VITALS:  /80   Pulse 75   Ht 160 cm (63\")   Wt 87.5 kg (193 lb)   SpO2 99%   BMI 34.19 kg/m²     Physical Exam   Constitutional: She appears well-developed and well-nourished. No distress.   Pulmonary/Chest: Effort normal. No respiratory distress.   Musculoskeletal:        Left knee: She exhibits decreased range of motion and swelling. She exhibits no erythema. Tenderness found. Lateral joint line tenderness noted.   Neurological: She is alert.   Skin: Skin is warm and dry. Turgor is normal. Rash noted.   Macular urticarial rash on anterior chest bilateral upper ext no cellultitis   Vitals reviewed.      LABS  No new labs    ASSESSMENT/PLAN  Gino was seen today for rash and edema.    Diagnoses and all orders for this visit:    Rash  -     predniSONE (DELTASONE) 10 MG (21) tablet pack; Use as directed on package  -     triamcinolone (KENALOG) 0.1 % cream; Apply  topically to the appropriate area as directed 2 (Two) Times a Day for 7 days.    Chronic pain of left knee  -     predniSONE (DELTASONE) 10 MG (21) tablet pack; Use as directed on package    steroids will likely help knee pain some until she can get appt with ortho. Pt states does not need referral; she will make appt.    I discussed the patients findings and my recommendations with patient.  Patient was encouraged to keep me informed " of any acute changes, lack of improvement, or any new concerning symptoms.    Patient voiced understanding of all instructions and denied further questions.      FOLLOW-UP  Return if symptoms worsen or fail to improve.    Electronically signed by:    CLAY Bagley  08/05/2019

## 2019-08-10 LAB — ANA TITR SER IF: NEGATIVE {TITER}

## 2019-08-16 ENCOUNTER — OFFICE VISIT (OUTPATIENT)
Dept: ORTHOPEDIC SURGERY | Facility: CLINIC | Age: 49
End: 2019-08-16

## 2019-08-16 VITALS — BODY MASS INDEX: 34.2 KG/M2 | HEIGHT: 63 IN

## 2019-08-16 DIAGNOSIS — M17.12 PRIMARY OSTEOARTHRITIS OF LEFT KNEE: ICD-10-CM

## 2019-08-16 DIAGNOSIS — M25.562 LEFT KNEE PAIN, UNSPECIFIED CHRONICITY: ICD-10-CM

## 2019-08-16 DIAGNOSIS — M25.462 KNEE EFFUSION, LEFT: Primary | ICD-10-CM

## 2019-08-16 PROCEDURE — 20610 DRAIN/INJ JOINT/BURSA W/O US: CPT | Performed by: ORTHOPAEDIC SURGERY

## 2019-08-16 PROCEDURE — 99213 OFFICE O/P EST LOW 20 MIN: CPT | Performed by: ORTHOPAEDIC SURGERY

## 2019-08-16 RX ORDER — LIDOCAINE HYDROCHLORIDE 10 MG/ML
3 INJECTION, SOLUTION EPIDURAL; INFILTRATION; INTRACAUDAL; PERINEURAL
Status: COMPLETED | OUTPATIENT
Start: 2019-08-16 | End: 2019-08-16

## 2019-08-16 RX ORDER — TRIAMCINOLONE ACETONIDE 40 MG/ML
80 INJECTION, SUSPENSION INTRA-ARTICULAR; INTRAMUSCULAR
Status: COMPLETED | OUTPATIENT
Start: 2019-08-16 | End: 2019-08-16

## 2019-08-16 RX ADMIN — LIDOCAINE HYDROCHLORIDE 3 ML: 10 INJECTION, SOLUTION EPIDURAL; INFILTRATION; INTRACAUDAL; PERINEURAL at 07:52

## 2019-08-16 RX ADMIN — TRIAMCINOLONE ACETONIDE 80 MG: 40 INJECTION, SUSPENSION INTRA-ARTICULAR; INTRAMUSCULAR at 07:52

## 2019-08-16 NOTE — PROGRESS NOTES
Procedure   Large Joint Arthrocentesis: L knee  Date/Time: 8/16/2019 7:52 AM  Consent given by: patient  Site marked: site marked  Timeout: Immediately prior to procedure a time out was called to verify the correct patient, procedure, equipment, support staff and site/side marked as required   Supporting Documentation  Indications: pain   Procedure Details  Location: knee - L knee  Preparation: Patient was prepped and draped in the usual sterile fashion  Needle size: 22 G  Approach: anterolateral  Medications administered: 3 mL lidocaine PF 1% 1 %; 80 mg triamcinolone acetonide 40 MG/ML (bupicavaine 0.25% ndc: [id number] lot: [id number] exp: [id number] Psychiatric)  Patient tolerance: patient tolerated the procedure well with no immediate complications

## 2019-08-16 NOTE — PROGRESS NOTES
Orthopaedic Clinic Note: Knee New Patient    Chief Complaint   Patient presents with   • Left Knee - Pain        HPI    Gino Maguire is a 49 y.o. female who presents with left knee pain for 2 month(s). Onset has been atraumatic and gradual nature.  Patient is complaining of intermittent swelling and stiffness of the knee that comes on intermittently.  She denies any inciting event.  She states that the knee will swell and she will have associated stiffness when walking and standing.  Over the past 2 months she has had 3 separate episodes of the knee swelling followed by a decrease in swelling.  Is associated with a constant dull ache.  She denies any focal pain.  She is ambulatory with no assistive device.  Prior treatments have included occasional anti-inflammatories as well as oral steroids and a compression knee sleeve.  She is here to discuss treatment options for ongoing intermittent knee swelling she currently rates her pain a 1/10 on the pain scale.  Walking weightbearing increase her symptoms.  Sitting and resting eases her symptoms.    Past Medical History:   Diagnosis Date   • Abdominal pain    • Abdominal pain, RUQ     Check cmp. refer to [facility] u/s and gen surg eval d/t cholelithiasis noted on CT of abd/pelvis donein ER 1/16   • Acute bronchitis     Take cefdinir and medrol dose pack as directed. Use otc mucinex. Continue to rest and push fluids. Call or rtc if no better. Gave phenergan w/ codeine cough syrup 5 ml po prn #120ml, no RF per Dr. Cesar   • Acute pharyngitis     Check for monod/t exposure/ Pt will restart flovent as used for esophagitis. If no improvement take the medrol dose pack she was given at least visit   • Acute sinusitis    • Acute upper respiratory infection    • Anxiety    • Body aches    • Cholelithiasis     Reviewed CT of adb/pelvis from ER 1/16 showed cholelithiasis. Will refer for gen surgery eval and [facility] u/s   • Chronic pain disorder    • Cough    • Eosinophilic esophagitis     • Extremity pain    • Gestational diabetes    • Headache    • Influenza    • Labyrinthitis    • Low back pain    • Lumbar strain    • Lumbosacral disc disease    • Neck pain    • Right hip pain    • Shingles    • Strain of thoracic region    • Viral infection    • Viral pharyngitis       Past Surgical History:   Procedure Laterality Date   • BREAST BIOPSY Left 10/2018    Benign STEREO    •  SECTION      97, 00, 04, 12   • DILATATION AND CURETTAGE     • ENDOSCOPY     • OOPHORECTOMY Right 2017   • WISDOM TOOTH EXTRACTION        Family History   Problem Relation Age of Onset   • Arthritis Mother    • Hypertension Mother    • Thyroid disease Mother    • Arthritis Father    • Hypertension Father    • Heart disease Father    • Heart attack Other    • Arthritis Other    • Hypertension Other    • Migraines Other    • Stroke Other    • Breast cancer Other 31   • Ovarian cancer Cousin 42   • Heart failure Paternal Grandmother    • Endometrial cancer Neg Hx    • Colon cancer Neg Hx      Social History     Socioeconomic History   • Marital status:      Spouse name: Not on file   • Number of children: Not on file   • Years of education: Not on file   • Highest education level: Not on file   Occupational History   • Occupation:    Tobacco Use   • Smoking status: Former Smoker     Packs/day: 1.00     Years: 3.00     Pack years: 3.00     Types: Cigarettes     Last attempt to quit: 2009     Years since quitting: 10.0   • Smokeless tobacco: Never Used   Substance and Sexual Activity   • Alcohol use: No   • Drug use: No   • Sexual activity: Defer   Lifestyle   • Physical activity:     Days per week: 0 days     Minutes per session: Not on file   • Stress: Very much   Social History Narrative    Caffeine: 2 serving per day.      Current Outpatient Medications on File Prior to Visit   Medication Sig Dispense Refill   • albuterol sulfate  (90 Base) MCG/ACT inhaler Inhale 2  puffs Every 4 (Four) Hours As Needed for Wheezing. 1 inhaler 0   • ALPRAZolam (XANAX) 0.25 MG tablet Take 1 tablet by mouth At Night As Needed for Anxiety or Sleep. One po bid prn 30 tablet 0   • buPROPion XL (WELLBUTRIN XL) 150 MG 24 hr tablet take 1 tablet by mouth every morning 30 tablet 5   • hydrochlorothiazide (HYDRODIURIL) 12.5 MG tablet Take 1 tablet by mouth Daily. 30 tablet 2   • L-Methylfolate-Algae 15-90.314 MG capsule take 1 capsule by mouth daily 30 capsule 0   • metoclopramide (REGLAN) 10 MG tablet Take 1 tablet by mouth 4 (Four) Times a Day As Needed (nausea associated migraine). 30 tablet 5   • Multiple Vitamins-Minerals (MULTIVITAMIN WITH MINERALS) tablet tablet Take 1 tablet by mouth Daily.     • ondansetron (ZOFRAN) 4 MG tablet take 1 tablet by mouth every 8 hours if needed for nausea and vomiting 14 tablet 1   • predniSONE (DELTASONE) 10 MG (21) tablet pack Use as directed on package 1 each 0   • promethazine (PHENERGAN) 25 MG tablet Take 1 tablet by mouth Every 8 (Eight) Hours As Needed for Nausea or Vomiting. 21 tablet 0   • propranolol (INDERAL) 10 MG tablet Take 1 tablet by mouth 2 (Two) Times a Day. 60 tablet 11   • vitamin D (ERGOCALCIFEROL) 70409 units capsule capsule Take 1 capsule by mouth 1 (One) Time Per Week.  0     No current facility-administered medications on file prior to visit.       Allergies   Allergen Reactions   • Maxalt [Rizatriptan] Anaphylaxis   • Sulfa Antibiotics Rash   • Sumatriptan Anaphylaxis   • Zolmitriptan Anaphylaxis   • Labetalol Rash        Review of Systems   Constitutional: Negative.    Eyes: Negative.    Respiratory: Negative.    Cardiovascular: Negative.    Gastrointestinal: Positive for abdominal pain.   Endocrine: Negative.    Genitourinary: Negative.    Musculoskeletal: Positive for arthralgias.   Skin: Positive for rash.   Allergic/Immunologic: Positive for environmental allergies.   Neurological: Positive for headaches.   Hematological: Negative.   "  Psychiatric/Behavioral: The patient is nervous/anxious.         The patient's Review of Systems was personally reviewed and confirmed as accurate.    The following portions of the patient's history were reviewed and updated as appropriate: allergies, current medications, past family history, past medical history, past social history, past surgical history and problem list.    Physical Exam  Height 160 cm (62.99\"), not currently breastfeeding.    Body mass index is 34.2 kg/m².    GENERAL APPEARANCE: awake, alert & oriented x 3, in no acute distress and well developed, well nourished  PSYCH: normal affect  LUNGS:  breathing nonlabored  EYES: sclera anicteric  CARDIOVASCULAR: palpable dorsalis pedis, palpable posterior tibial bilaterally. Capillary refill less than 2 seconds  EXTREMITIES: no clubbing, cyanosis  GAIT:  Normal            Right Lower Extremity Exam:   ----------  Hip Exam  ----------  FLEXION CONTRACTURE: None  FLEXION: 110 degrees  INTERNAL ROTATION: 20 degrees at 90 degrees of flexion   EXTERNAL ROTATION: 40 degrees at 90 degrees of flexion    PAIN WITH HIP MOTION: no  ----------  Knee Exam  ----------  ALIGNMENT: neutral, no varus or valgus deformity     RANGE OF MOTION:  Normal (0-120 degrees) with no extensor lag or flexion contracture  LIGAMENTOUS STABILITY:   stable to varus and valgus stress at 0 and 30 degrees without any evidence of laxity     STRENGTH:  5/5 knee flexion, extension. 5/5 ankle dorsiflexion and plantarflexion.     PAIN WITH PALPATION: denies tenderness to palpation about the knee, denies medial or lateral joint line pain  KNEE EFFUSION: no  PAIN WITH KNEE ROM: no  PATELLAR CREPITUS: no  SPECIAL EXAM FINDINGS:  Negative patellar compression    REFLEXES:  PATELLAR 2+/4  ACHILLES 2+/4    CLONUS: negative  STRAIGHT LEG TEST:   negative    SENSATION TO LIGHT TOUCH:  DEEP PERONEAL/SUPERFICIAL PERONEAL/SURAL/SAPHENOUS/TIBIAL:   intact    EDEMA:  no  ERYTHEMA:  no  WOUNDS/INCISIONS: " none, no overlying skin problems.      Left Lower Extremity Exam:   ----------  Hip Exam  ----------  FLEXION CONTRACTURE: None  FLEXION: 110 degrees  INTERNAL ROTATION: 20 degrees at 90 degrees of flexion   EXTERNAL ROTATION: 40 degrees at 90 degrees of flexion    PAIN WITH HIP MOTION: no  ----------  Knee Exam  ----------  ALIGNMENT: neutral, no varus or valgus deformity     RANGE OF MOTION:  Normal (0-120 degrees) with no extensor lag or flexion contracture  LIGAMENTOUS STABILITY:   stable to varus and valgus stress at 0 and 30 degrees without any evidence of laxity     STRENGTH:  5/5 knee flexion, extension. 5/5 ankle dorsiflexion and plantarflexion.     PAIN WITH PALPATION: denies tenderness to palpation about the knee, denies medial or lateral joint line pain  KNEE EFFUSION: trace effusion  PAIN WITH KNEE ROM: no  PATELLAR CREPITUS: no  SPECIAL EXAM FINDINGS:  Negative patellar compression    REFLEXES:  PATELLAR 2+/4  ACHILLES 2+/4    CLONUS: negative  STRAIGHT LEG TEST:   negative    SENSATION TO LIGHT TOUCH:  DEEP PERONEAL/SUPERFICIAL PERONEAL/SURAL/SAPHENOUS/TIBIAL:   intact    EDEMA:  no  ERYTHEMA:  no  WOUNDS/INCISIONS: none, no overlying skin problems.    ______________________________________________________________________  ______________________________________________________________________    RADIOGRAPHIC FINDINGS:   Indication: Left knee pain    Comparison: Todays xrays were compared to previous xrays from 12/12/2016    Left knee(s) 4 views: mild tricompartmental osteoarthritis and No significant changes compared to prior radiographs.      Assessment/Plan:   Diagnosis Plan   1. Knee effusion, left     2. Left knee pain, unspecified chronicity  XR Knee 4+ View Left   3. Primary osteoarthritis of left knee       Patient has symptoms consistent with knee effusion and mild arthritic changes of the knee joint.  I discussed treatment options.  She is agreeable to cortisone injection left knee today.   She will follow-up as needed.    Procedure Note:  I discussed with the patient the potential benefits of performing a therapeutic injection of the left knee as well as potential risks including but not limited to infection, swelling, pain, bleeding, bruising, nerve/vessel damage, skin color changes, transient elevation in blood glucose levels, and fat atrophy. After informed consent and after the area was prepped with alcohol, ethyl chloride was used to numb the skin. Via the superior lateral approach, 3cc of 1% lidocaine, 3cc of 0.25% marcaine and 2 cc of 40mg/ml of Kenalog were injected into the left knee. The patient tolerated the procedure well. There were no complications. A sterile dressing was placed over the injection site.      Shay Rock MD  08/16/19  7:53 AM

## 2019-09-03 ENCOUNTER — OFFICE VISIT (OUTPATIENT)
Dept: INTERNAL MEDICINE | Facility: CLINIC | Age: 49
End: 2019-09-03

## 2019-09-03 VITALS
HEIGHT: 63 IN | BODY MASS INDEX: 32.6 KG/M2 | TEMPERATURE: 97.9 F | WEIGHT: 184 LBS | OXYGEN SATURATION: 100 % | DIASTOLIC BLOOD PRESSURE: 80 MMHG | SYSTOLIC BLOOD PRESSURE: 128 MMHG | HEART RATE: 71 BPM

## 2019-09-03 DIAGNOSIS — R21 RASH: Primary | ICD-10-CM

## 2019-09-03 PROCEDURE — 87070 CULTURE OTHR SPECIMN AEROBIC: CPT | Performed by: NURSE PRACTITIONER

## 2019-09-03 PROCEDURE — 87205 SMEAR GRAM STAIN: CPT | Performed by: NURSE PRACTITIONER

## 2019-09-03 PROCEDURE — 99213 OFFICE O/P EST LOW 20 MIN: CPT | Performed by: NURSE PRACTITIONER

## 2019-09-03 PROCEDURE — 87186 SC STD MICRODIL/AGAR DIL: CPT | Performed by: NURSE PRACTITIONER

## 2019-09-03 PROCEDURE — 87077 CULTURE AEROBIC IDENTIFY: CPT | Performed by: NURSE PRACTITIONER

## 2019-09-03 RX ORDER — GINSENG 100 MG
CAPSULE ORAL 3 TIMES DAILY
Qty: 28 G | Refills: 0 | Status: SHIPPED | OUTPATIENT
Start: 2019-09-03 | End: 2020-01-24

## 2019-09-03 NOTE — PROGRESS NOTES
Chief Complaint   Patient presents with   • Rash       History of Present Illness  49 y.o.female presents for rash.  C/o itchy rash under right breast with one bump that looks like a blister with oozing clear fluid. A few scattered smaller bumps coming up below area. Nonpainful. No tingling or numbness. Itches. No known insect bites or other exposures. No changes in detergents soaps. No fever.    Review of Systems   Constitutional: Negative for chills and fever.   Skin: Positive for rash.   Neurological: Negative for numbness.         PMSFH  The following portions of the patient's history were reviewed and updated as appropriate: allergies, current medications, past family history, past medical history, past social history, past surgical history and problem list.       Past Medical History:   Diagnosis Date   • Abdominal pain    • Abdominal pain, RUQ     Check cmp. refer to [facility] u/s and gen surg eval d/t cholelithiasis noted on CT of abd/pelvis donein ER 1/16   • Acute bronchitis     Take cefdinir and medrol dose pack as directed. Use otc mucinex. Continue to rest and push fluids. Call or rtc if no better. Gave phenergan w/ codeine cough syrup 5 ml po prn #120ml, no RF per Dr. Cesar   • Acute pharyngitis     Check for monod/t exposure/ Pt will restart flovent as used for esophagitis. If no improvement take the medrol dose pack she was given at least visit   • Acute sinusitis    • Acute upper respiratory infection    • Anxiety    • Body aches    • Cholelithiasis     Reviewed CT of adb/pelvis from ER 1/16 showed cholelithiasis. Will refer for gen surgery eval and [facility] u/s   • Chronic pain disorder    • Cough    • Eosinophilic esophagitis    • Extremity pain    • Gestational diabetes    • Headache    • Influenza    • Labyrinthitis    • Low back pain    • Lumbar strain    • Lumbosacral disc disease    • Neck pain    • Right hip pain    • Shingles    • Strain of thoracic region    • Viral infection    • Viral pharyngitis        Past Surgical History:   Procedure Laterality Date   • BREAST BIOPSY Left 10/2018    Benign STEREO    •  SECTION      97, 00, 04, 12   • DILATATION AND CURETTAGE     • ENDOSCOPY     • OOPHORECTOMY Right 2017   • WISDOM TOOTH EXTRACTION        Allergies   Allergen Reactions   • Maxalt [Rizatriptan] Anaphylaxis   • Sulfa Antibiotics Rash   • Sumatriptan Anaphylaxis   • Zolmitriptan Anaphylaxis   • Labetalol Rash      Family History   Problem Relation Age of Onset   • Arthritis Mother    • Hypertension Mother    • Thyroid disease Mother    • Arthritis Father    • Hypertension Father    • Heart disease Father    • Heart attack Other    • Arthritis Other    • Hypertension Other    • Migraines Other    • Stroke Other    • Breast cancer Other 31   • Ovarian cancer Cousin 42   • Heart failure Paternal Grandmother    • Endometrial cancer Neg Hx    • Colon cancer Neg Hx             Current Outpatient Medications:   •  albuterol sulfate  (90 Base) MCG/ACT inhaler, Inhale 2 puffs Every 4 (Four) Hours As Needed for Wheezing., Disp: 1 inhaler, Rfl: 0  •  ALPRAZolam (XANAX) 0.25 MG tablet, Take 1 tablet by mouth At Night As Needed for Anxiety or Sleep. One po bid prn, Disp: 30 tablet, Rfl: 0  •  buPROPion XL (WELLBUTRIN XL) 150 MG 24 hr tablet, take 1 tablet by mouth every morning, Disp: 30 tablet, Rfl: 5  •  hydrochlorothiazide (HYDRODIURIL) 12.5 MG tablet, Take 1 tablet by mouth Daily., Disp: 30 tablet, Rfl: 2  •  L-Methylfolate-Algae 15-90.314 MG capsule, take 1 capsule by mouth daily, Disp: 30 capsule, Rfl: 0  •  metoclopramide (REGLAN) 10 MG tablet, Take 1 tablet by mouth 4 (Four) Times a Day As Needed (nausea associated migraine)., Disp: 30 tablet, Rfl: 5  •  Multiple Vitamins-Minerals (MULTIVITAMIN WITH MINERALS) tablet tablet, Take 1 tablet by mouth Daily., Disp: , Rfl:   •  ondansetron (ZOFRAN) 4 MG tablet, take 1 tablet by mouth every 8 hours if needed for nausea and  "vomiting, Disp: 14 tablet, Rfl: 1  •  promethazine (PHENERGAN) 25 MG tablet, Take 1 tablet by mouth Every 8 (Eight) Hours As Needed for Nausea or Vomiting., Disp: 21 tablet, Rfl: 0  •  propranolol (INDERAL) 10 MG tablet, Take 1 tablet by mouth 2 (Two) Times a Day., Disp: 60 tablet, Rfl: 11  •  vitamin D (ERGOCALCIFEROL) 73283 units capsule capsule, Take 1 capsule by mouth 1 (One) Time Per Week., Disp: , Rfl: 0    VITALS:  /80   Pulse 71   Temp 97.9 °F (36.6 °C)   Ht 160 cm (63\")   Wt 83.5 kg (184 lb)   SpO2 100%   BMI 32.59 kg/m²     Physical Exam   Constitutional: She is oriented to person, place, and time. She appears well-developed and well-nourished. No distress.   HENT:   Head: Normocephalic.   Pulmonary/Chest: Effort normal.   Neurological: She is alert and oriented to person, place, and time.   Skin: Skin is warm and dry. Rash noted.   Round erythematous lesion with scant amt clear fluid  no swelling, streaking or surrounding erythema lesion located on underside right breast about 6 oclock; Few scattered small red papules on upper right abd nondrainig.       LABS  Results for orders placed or performed in visit on 09/03/19   Wound Culture - Wound, Breast, Right   Result Value Ref Range    Gram Stain No WBCs or organisms seen        ASSESSMENT/PLAN  Gino was seen today for rash.    Diagnoses and all orders for this visit:    Rash  -     bacitracin 500 UNIT/GM ointment; Apply  topically to the appropriate area as directed 3 (Three) Times a Day.  -     Wound Culture - Wound, Breast, Right    no cellulitis; with one area open will add topical abx. Was able to get wound culture with drainage noted.  Can use OTC hydrocortisone cream to area as needed.   Oral antihistamine to help itching.    If worsening or fever or developed surrounding erythema, she is to let me know. IIf worsening of symptoms or no improvement in symptoms or fever > 101.5 patient should contact our office for further evaluation " treatment or seek urgent care.    I discussed the patients findings and my recommendations with patient.  Patient was encouraged to keep me informed of any acute changes, lack of improvement, or any new concerning symptoms.    Patient voiced understanding of all instructions and denied further questions.      FOLLOW-UP  Return if symptoms worsen or fail to improve.    Electronically signed by:    CLAY Bagley  09/03/2019

## 2019-09-06 ENCOUNTER — TELEPHONE (OUTPATIENT)
Dept: INTERNAL MEDICINE | Facility: CLINIC | Age: 49
End: 2019-09-06

## 2019-09-06 DIAGNOSIS — A49.01 STAPH AUREUS INFECTION: Primary | ICD-10-CM

## 2019-09-06 LAB
BACTERIA SPEC AEROBE CULT: ABNORMAL
GRAM STN SPEC: ABNORMAL

## 2019-09-06 RX ORDER — DOXYCYCLINE HYCLATE 100 MG/1
100 CAPSULE ORAL 2 TIMES DAILY
Qty: 20 CAPSULE | Refills: 0 | Status: SHIPPED | OUTPATIENT
Start: 2019-09-06 | End: 2019-09-07 | Stop reason: SINTOL

## 2019-09-06 NOTE — TELEPHONE ENCOUNTER
----- Message from Tangela Carter, CLAY sent at 9/6/2019  8:02 AM EDT -----  Let pt know her final sensitively came back on wound culture and did show resistance to pcn.  I would like her to take a round of doxycycline to help with rash.  I sent RX.  Do not take her multi vitamin while taking doxy as makes med not work well. Thanks.

## 2019-09-07 ENCOUNTER — TELEPHONE (OUTPATIENT)
Dept: INTERNAL MEDICINE | Facility: CLINIC | Age: 49
End: 2019-09-07

## 2019-09-07 RX ORDER — AZITHROMYCIN 250 MG/1
TABLET, FILM COATED ORAL
Qty: 6 TABLET | Refills: 0 | Status: SHIPPED | OUTPATIENT
Start: 2019-09-07 | End: 2020-01-24

## 2019-09-07 NOTE — TELEPHONE ENCOUNTER
PATIENT IS CALLING, THE DOXYCYCLINE IS MAKING HER SICK TO HER STOMACH, IS THERE ANYTHING ELSE SHE CAN TAKE, SHE SAYS SHE CANNOT TAKE AUGMENTIN EITHER .  HER [facility].  PT CAN BE REACHED [phone]

## 2019-09-17 ENCOUNTER — OFFICE VISIT (OUTPATIENT)
Dept: ORTHOPEDIC SURGERY | Facility: CLINIC | Age: 49
End: 2019-09-17

## 2019-09-17 VITALS — BODY MASS INDEX: 32.62 KG/M2 | HEART RATE: 82 BPM | HEIGHT: 63 IN | WEIGHT: 184.08 LBS | OXYGEN SATURATION: 98 %

## 2019-09-17 DIAGNOSIS — M17.12 PRIMARY OSTEOARTHRITIS OF LEFT KNEE: ICD-10-CM

## 2019-09-17 DIAGNOSIS — M25.462 KNEE EFFUSION, LEFT: Primary | ICD-10-CM

## 2019-09-17 PROCEDURE — 99213 OFFICE O/P EST LOW 20 MIN: CPT | Performed by: ORTHOPAEDIC SURGERY

## 2019-09-17 NOTE — PROGRESS NOTES
Orthopaedic Clinic Note: Knee Established Patient    Chief Complaint   Patient presents with   • Follow-up     4 weeks- Knee effusion, left         HPI    It has been 4  week(s) since Ms. Maguire's last visit. She returns to clinic today for follow-up left knee effusion.  She received intra-articular injection 4 weeks ago.  The injection provided about a week of relief before her swelling recurred.  She rates her pain 3/10 on the pain scale.  She is continued to have global pain especially at the medial joint line.  She is having constant dull aching sensation.  She is here to discuss further treatment options that she is continue to experience limitations in daily activity.    Past Medical History:   Diagnosis Date   • Abdominal pain    • Abdominal pain, RUQ     Check cmp. refer to [facility] u/s and gen surg eval d/t cholelithiasis noted on CT of abd/pelvis donein ER 1/16   • Acute bronchitis     Take cefdinir and medrol dose pack as directed. Use otc mucinex. Continue to rest and push fluids. Call or rtc if no better. Gave phenergan w/ codeine cough syrup 5 ml po prn #120ml, no RF per Dr. Cesar   • Acute pharyngitis     Check for monod/t exposure/ Pt will restart flovent as used for esophagitis. If no improvement take the medrol dose pack she was given at least visit   • Acute sinusitis    • Acute upper respiratory infection    • Anxiety    • Body aches    • Cholelithiasis     Reviewed CT of adb/pelvis from ER 1/16 showed cholelithiasis. Will refer for gen surgery eval and [facility] u/s   • Chronic pain disorder    • Cough    • Eosinophilic esophagitis    • Extremity pain    • Gestational diabetes    • Headache    • Influenza    • Labyrinthitis    • Low back pain    • Lumbar strain    • Lumbosacral disc disease    • Neck pain    • Right hip pain    • Shingles    • Strain of thoracic region    • Viral infection    • Viral pharyngitis       Past Surgical History:   Procedure Laterality Date   • BREAST BIOPSY Left 10/2018     Benign STEREO    •  SECTION      97, 00, 04, 12   • DILATATION AND CURETTAGE     • ENDOSCOPY     • OOPHORECTOMY Right 2017   • WISDOM TOOTH EXTRACTION        Family History   Problem Relation Age of Onset   • Arthritis Mother    • Hypertension Mother    • Thyroid disease Mother    • Arthritis Father    • Hypertension Father    • Heart disease Father    • Heart attack Other    • Arthritis Other    • Hypertension Other    • Migraines Other    • Stroke Other    • Breast cancer Other 31   • Ovarian cancer Cousin 42   • Heart failure Paternal Grandmother    • Endometrial cancer Neg Hx    • Colon cancer Neg Hx      Social History     Socioeconomic History   • Marital status:      Spouse name: Not on file   • Number of children: Not on file   • Years of education: Not on file   • Highest education level: Not on file   Occupational History   • Occupation:    Tobacco Use   • Smoking status: Former Smoker     Packs/day: 1.00     Years: 3.00     Pack years: 3.00     Types: Cigarettes     Last attempt to quit: 2009     Years since quitting: 10.1   • Smokeless tobacco: Never Used   Substance and Sexual Activity   • Alcohol use: No   • Drug use: No   • Sexual activity: Defer   Lifestyle   • Physical activity:     Days per week: 0 days     Minutes per session: Not on file   • Stress: Very much   Social History Narrative    Caffeine: 2 serving per day.      Current Outpatient Medications on File Prior to Visit   Medication Sig Dispense Refill   • albuterol sulfate  (90 Base) MCG/ACT inhaler Inhale 2 puffs Every 4 (Four) Hours As Needed for Wheezing. 1 inhaler 0   • ALPRAZolam (XANAX) 0.25 MG tablet Take 1 tablet by mouth At Night As Needed for Anxiety or Sleep. One po bid prn 30 tablet 0   • azithromycin (ZITHROMAX) 250 MG tablet Take 2 tablets the first day, then 1 tablet daily for 4 days. 6 tablet 0   • bacitracin 500 UNIT/GM ointment Apply  topically to the  "appropriate area as directed 3 (Three) Times a Day. 28 g 0   • buPROPion XL (WELLBUTRIN XL) 150 MG 24 hr tablet take 1 tablet by mouth every morning 30 tablet 5   • hydrochlorothiazide (HYDRODIURIL) 12.5 MG tablet Take 1 tablet by mouth Daily. 30 tablet 2   • L-Methylfolate-Algae 15-90.314 MG capsule take 1 capsule by mouth daily 30 capsule 0   • metoclopramide (REGLAN) 10 MG tablet Take 1 tablet by mouth 4 (Four) Times a Day As Needed (nausea associated migraine). 30 tablet 5   • Multiple Vitamins-Minerals (MULTIVITAMIN WITH MINERALS) tablet tablet Take 1 tablet by mouth Daily.     • ondansetron (ZOFRAN) 4 MG tablet take 1 tablet by mouth every 8 hours if needed for nausea and vomiting 14 tablet 1   • promethazine (PHENERGAN) 25 MG tablet Take 1 tablet by mouth Every 8 (Eight) Hours As Needed for Nausea or Vomiting. 21 tablet 0   • propranolol (INDERAL) 10 MG tablet Take 1 tablet by mouth 2 (Two) Times a Day. 60 tablet 11   • vitamin D (ERGOCALCIFEROL) 23577 units capsule capsule Take 1 capsule by mouth 1 (One) Time Per Week.  0     No current facility-administered medications on file prior to visit.       Allergies   Allergen Reactions   • Maxalt [Rizatriptan] Anaphylaxis   • Sulfa Antibiotics Rash   • Sumatriptan Anaphylaxis   • Zolmitriptan Anaphylaxis   • Labetalol Rash        Review of Systems   Constitutional: Negative.    HENT: Negative.    Eyes: Negative.    Respiratory: Negative.    Cardiovascular: Negative.    Gastrointestinal: Negative.    Endocrine: Negative.    Genitourinary: Negative.    Musculoskeletal: Positive for arthralgias and joint swelling.   Skin: Negative.    Allergic/Immunologic: Negative.    Neurological: Negative.    Hematological: Negative.    Psychiatric/Behavioral: Negative.         The patient's Review of Systems was personally reviewed and confirmed as accurate.    Physical Exam  Pulse 82, height 160 cm (62.99\"), weight 83.5 kg (184 lb 1.4 oz), SpO2 98 %, not currently " breastfeeding.    Body mass index is 32.62 kg/m².    GENERAL APPEARANCE: awake, alert, oriented, in no acute distress and well developed, well nourished  LUNGS:  breathing nonlabored  EXTREMITIES: no clubbing, cyanosis  PERIPHERAL PULSES: palpable dorsalis pedis and posterior tibial pulses bilaterally.    GAIT:  Antalgic        ----------  Left Knee Exam:  ----------  ALIGNMENT: neutral  ----------  RANGE OF MOTION:  Decreased (5 - 115 degrees) with no extensor lag  LIGAMENTOUS STABILITY:   stable to varus and valgus stress at terminal extension and 30 degrees without any evidence of laxity  ----------  STRENGTH:  KNEE FLEXION 5/5  KNEE EXTENSION  5/5  ANKLE DORSIFLEXION  5/5  ANKLE PLANTARFLEXION  5/5  ----------  PAIN WITH PALPATION:medial joint line and popliteal region  KNEE EFFUSION: yes, mild effusion  PAIN WITH KNEE ROM: yes  PATELLAR CREPITUS:  no  ----------  SENSATION TO LIGHT TOUCH:  DEEP PERONEAL/SUPERFICIAL PERONEAL/SURAL/SAPHENOUS/TIBIAL:    intact  ----------  EDEMA:  no  ERYTHEMA:    no  WOUNDS/INCISIONS:   no  _____________________________________________________________________  _____________________________________________________________________    RADIOGRAPHIC FINDINGS:   No new imaging today    Assessment/Plan:   Diagnosis Plan   1. Knee effusion, left     2. Primary osteoarthritis of left knee       Patient has failed intra-articular steroid injection and has a recurrent effusion as well as continuing ongoing limitations in daily activities.  At this stage, her symptoms have been ongoing for approximately 3 months.  She has failed anti-inflammatory treatment and cortisone injection.  I recommended an MRI to evaluate for intra-articular pathology.  She is agreeable to this.  I will see her back after the MRI to discuss the results and how to proceed.      Shay Rock MD  09/17/19  8:57 AM

## 2019-09-21 ENCOUNTER — HOSPITAL ENCOUNTER (OUTPATIENT)
Dept: MRI IMAGING | Facility: HOSPITAL | Age: 49
Discharge: HOME | End: 2019-09-21
Admitting: ORTHOPAEDIC SURGERY

## 2019-09-21 DIAGNOSIS — M25.462 KNEE EFFUSION, LEFT: ICD-10-CM

## 2019-09-21 PROCEDURE — 73721 MRI JNT OF LWR EXTRE W/O DYE: CPT

## 2019-10-03 ENCOUNTER — OFFICE VISIT (OUTPATIENT)
Dept: ORTHOPEDIC SURGERY | Facility: CLINIC | Age: 49
End: 2019-10-03

## 2019-10-03 VITALS — HEIGHT: 63 IN | BODY MASS INDEX: 32.62 KG/M2 | WEIGHT: 184.08 LBS | OXYGEN SATURATION: 98 % | HEART RATE: 78 BPM

## 2019-10-03 DIAGNOSIS — M17.12 PRIMARY OSTEOARTHRITIS OF LEFT KNEE: Primary | ICD-10-CM

## 2019-10-03 PROCEDURE — 99213 OFFICE O/P EST LOW 20 MIN: CPT | Performed by: ORTHOPAEDIC SURGERY

## 2019-10-03 RX ORDER — MELOXICAM 15 MG/1
TABLET ORAL
Qty: 60 TABLET | Refills: 0 | Status: SHIPPED | OUTPATIENT
Start: 2019-10-03 | End: 2020-07-28 | Stop reason: SDUPTHER

## 2019-10-03 RX ORDER — FLUTICASONE PROPIONATE 50 MCG
2 SPRAY, SUSPENSION (ML) NASAL
COMMUNITY

## 2019-10-03 RX ORDER — TURMERIC 100 %
POWDER (GRAM) MISCELLANEOUS
COMMUNITY
End: 2020-07-14

## 2019-10-03 NOTE — PROGRESS NOTES
Orthopaedic Clinic Note: Knee Established Patient    Chief Complaint   Patient presents with   • Follow-up     MRI follow up; Knee effusion, left         HPI    It has been 2  week(s) since Ms. Maguire's last visit. She returns to clinic today for follow-up left knee pain.  She obtained an MRI and is here for follow-up and discussion the MRI results.  She is continued to have intermittent pain and swelling that is worse with walking weightbearing activities.  Rates her pain 4/10 on the pain scale.  She is continued having throbbing sensation despite occasional anti-inflammatory treatment with Motrin.  She is here to discuss the results of her MRI.  She is ambulate with no assistive device today.    Past Medical History:   Diagnosis Date   • Abdominal pain    • Abdominal pain, RUQ     Check cmp. refer to [facility] u/s and gen surg eval d/t cholelithiasis noted on CT of abd/pelvis donein ER 1/16   • Acute bronchitis     Take cefdinir and medrol dose pack as directed. Use otc mucinex. Continue to rest and push fluids. Call or rtc if no better. Gave phenergan w/ codeine cough syrup 5 ml po prn #120ml, no RF per Dr. Cesar   • Acute pharyngitis     Check for monod/t exposure/ Pt will restart flovent as used for esophagitis. If no improvement take the medrol dose pack she was given at least visit   • Acute sinusitis    • Acute upper respiratory infection    • Anxiety    • Body aches    • Cholelithiasis     Reviewed CT of adb/pelvis from ER 1/16 showed cholelithiasis. Will refer for gen surgery eval and [facility] u/s   • Chronic pain disorder    • Cough    • Eosinophilic esophagitis    • Extremity pain    • Gestational diabetes    • Headache    • Influenza    • Labyrinthitis    • Low back pain    • Lumbar strain    • Lumbosacral disc disease    • Neck pain    • Right hip pain    • Shingles    • Strain of thoracic region    • Viral infection    • Viral pharyngitis       Past Surgical History:   Procedure Laterality Date   • BREAST  BIOPSY Left 10/2018    Benign STEREO    •  SECTION      97, 00, 04, 12   • DILATATION AND CURETTAGE     • ENDOSCOPY     • OOPHORECTOMY Right 2017   • WISDOM TOOTH EXTRACTION        Family History   Problem Relation Age of Onset   • Arthritis Mother    • Hypertension Mother    • Thyroid disease Mother    • Arthritis Father    • Hypertension Father    • Heart disease Father    • Heart attack Other    • Arthritis Other    • Hypertension Other    • Migraines Other    • Stroke Other    • Breast cancer Other 31   • Ovarian cancer Cousin 42   • Heart failure Paternal Grandmother    • Endometrial cancer Neg Hx    • Colon cancer Neg Hx      Social History     Socioeconomic History   • Marital status:      Spouse name: Not on file   • Number of children: Not on file   • Years of education: Not on file   • Highest education level: Not on file   Occupational History   • Occupation:    Tobacco Use   • Smoking status: Former Smoker     Packs/day: 1.00     Years: 3.00     Pack years: 3.00     Types: Cigarettes     Last attempt to quit: 2009     Years since quitting: 10.1   • Smokeless tobacco: Never Used   Substance and Sexual Activity   • Alcohol use: No   • Drug use: No   • Sexual activity: Defer   Lifestyle   • Physical activity:     Days per week: 0 days     Minutes per session: Not on file   • Stress: Very much   Social History Narrative    Caffeine: 2 serving per day.      Current Outpatient Medications on File Prior to Visit   Medication Sig Dispense Refill   • albuterol sulfate  (90 Base) MCG/ACT inhaler Inhale 2 puffs Every 4 (Four) Hours As Needed for Wheezing. 1 inhaler 0   • ALPRAZolam (XANAX) 0.25 MG tablet Take 1 tablet by mouth At Night As Needed for Anxiety or Sleep. One po bid prn 30 tablet 0   • azithromycin (ZITHROMAX) 250 MG tablet Take 2 tablets the first day, then 1 tablet daily for 4 days. 6 tablet 0   • bacitracin 500 UNIT/GM ointment Apply   topically to the appropriate area as directed 3 (Three) Times a Day. 28 g 0   • buPROPion XL (WELLBUTRIN XL) 150 MG 24 hr tablet take 1 tablet by mouth every morning 30 tablet 5   • Cetirizine HCl 10 MG capsule      • Ergocalciferol (VITAMIN D2 PO) Take 1 capsule by mouth.     • fluticasone (FLONASE) 50 MCG/ACT nasal spray 2 sprays into the nostril(s) as directed by provider.     • hydrochlorothiazide (HYDRODIURIL) 12.5 MG tablet Take 1 tablet by mouth Daily. 30 tablet 2   • L-Methylfolate-Algae 15-90.314 MG capsule take 1 capsule by mouth daily 30 capsule 0   • metoclopramide (REGLAN) 10 MG tablet Take 1 tablet by mouth 4 (Four) Times a Day As Needed (nausea associated migraine). 30 tablet 5   • Multiple Vitamins-Minerals (MULTIVITAMIN ADULT PO) Take  by mouth.     • Multiple Vitamins-Minerals (MULTIVITAMIN WITH MINERALS) tablet tablet Take 1 tablet by mouth Daily.     • ondansetron (ZOFRAN) 4 MG tablet take 1 tablet by mouth every 8 hours if needed for nausea and vomiting 14 tablet 1   • promethazine (PHENERGAN) 25 MG tablet Take 1 tablet by mouth Every 8 (Eight) Hours As Needed for Nausea or Vomiting. 21 tablet 0   • propranolol (INDERAL) 10 MG tablet Take 1 tablet by mouth 2 (Two) Times a Day. 60 tablet 11   • Turmeric, Curcuma [place]a, (TURMERIC ROOT) powder      • vitamin D (ERGOCALCIFEROL) 53527 units capsule capsule Take 1 capsule by mouth 1 (One) Time Per Week.  0   • [DISCONTINUED] L-methylfolate Calcium 15 MG tablet   0     No current facility-administered medications on file prior to visit.       Allergies   Allergen Reactions   • Maxalt [Rizatriptan] Anaphylaxis   • Sulfa Antibiotics Rash   • Sumatriptan Anaphylaxis   • Zolmitriptan Anaphylaxis   • Labetalol Rash        Review of Systems   Constitutional: Positive for fatigue.   HENT: Positive for postnasal drip, sneezing and tinnitus.    Eyes: Positive for itching.   Respiratory: Negative.    Cardiovascular: Negative.    Gastrointestinal: Negative.   "  Endocrine: Negative.    Genitourinary: Negative.    Musculoskeletal: Positive for arthralgias, back pain, joint swelling, neck pain and neck stiffness.   Skin: Negative.    Allergic/Immunologic: Positive for environmental allergies.   Neurological: Positive for headaches.   Hematological: Negative.    Psychiatric/Behavioral: The patient is nervous/anxious.         The patient's Review of Systems was personally reviewed and confirmed as accurate.    Physical Exam  Pulse 78, height 160 cm (62.99\"), weight 83.5 kg (184 lb 1.4 oz), SpO2 98 %, not currently breastfeeding.    Body mass index is 32.62 kg/m².    GENERAL APPEARANCE: awake, alert, oriented, in no acute distress and well developed, well nourished  LUNGS:  breathing nonlabored  EXTREMITIES: no clubbing, cyanosis  PERIPHERAL PULSES: palpable dorsalis pedis and posterior tibial pulses bilaterally.    GAIT:  Normal        ----------  Left Knee Exam:  ----------  ALIGNMENT: neutral  ----------  RANGE OF MOTION:  Decreased (3 - 115 degrees) with no extensor lag  LIGAMENTOUS STABILITY:   stable to varus and valgus stress at terminal extension and 30 degrees without any evidence of laxity  ----------  STRENGTH:  KNEE FLEXION 5/5  KNEE EXTENSION  5/5  ANKLE DORSIFLEXION  5/5  ANKLE PLANTARFLEXION  5/5  ----------  PAIN WITH PALPATION:medial joint line and popliteal region  KNEE EFFUSION: yes, trace effusion  PAIN WITH KNEE ROM: yes  PATELLAR CREPITUS:  no  ----------  SENSATION TO LIGHT TOUCH:  DEEP PERONEAL/SUPERFICIAL PERONEAL/SURAL/SAPHENOUS/TIBIAL:    intact  ----------  EDEMA:  no  ERYTHEMA:    no  WOUNDS/INCISIONS:   no  _____________________________________________________________________  _____________________________________________________________________    RADIOGRAPHIC FINDINGS:   MRI of the left knee from 9/2/2019 was personally reviewed.  MRI shows no evidence of meniscal tear.  There is significant arthritic findings particularly in the patellofemoral " compartment.    Assessment/Plan:   Diagnosis Plan   1. Primary osteoarthritis of left knee  meloxicam (MOBIC) 15 MG tablet     I discussed the results of the MRI with the patient.  I discussed treatment options.  She is agreeable to prescription anti-inflammatory.  In addition to this, I recommended activity modification.  She will follow-up as needed.      Sahy Rock MD  10/03/19  9:38 AM

## 2019-10-06 DIAGNOSIS — M79.89 SWELLING OF BOTH HANDS: ICD-10-CM

## 2019-10-06 DIAGNOSIS — I10 ESSENTIAL HYPERTENSION: ICD-10-CM

## 2019-10-06 RX ORDER — HYDROCHLOROTHIAZIDE 12.5 MG/1
TABLET ORAL
Qty: 30 TABLET | Refills: 2 | Status: SHIPPED | OUTPATIENT
Start: 2019-10-06 | End: 2020-01-24 | Stop reason: SDUPTHER

## 2019-10-14 RX ORDER — FLUTICASONE PROPIONATE 220 UG/1
AEROSOL, METERED RESPIRATORY (INHALATION)
Qty: 12 G | Refills: 2 | OUTPATIENT
Start: 2019-10-14

## 2019-10-21 DIAGNOSIS — K20.0 EOSINOPHILIC ESOPHAGITIS: Primary | ICD-10-CM

## 2019-10-21 RX ORDER — FLUTICASONE PROPIONATE 220 UG/1
AEROSOL, METERED RESPIRATORY (INHALATION)
Qty: 4 INHALER | Refills: 0 | Status: SHIPPED | OUTPATIENT
Start: 2019-10-21 | End: 2020-07-14

## 2020-01-24 ENCOUNTER — OFFICE VISIT (OUTPATIENT)
Dept: INTERNAL MEDICINE | Facility: CLINIC | Age: 50
End: 2020-01-24

## 2020-01-24 VITALS
HEART RATE: 83 BPM | WEIGHT: 180 LBS | HEIGHT: 63 IN | BODY MASS INDEX: 31.89 KG/M2 | SYSTOLIC BLOOD PRESSURE: 118 MMHG | DIASTOLIC BLOOD PRESSURE: 80 MMHG | OXYGEN SATURATION: 98 % | TEMPERATURE: 98.6 F

## 2020-01-24 DIAGNOSIS — R10.11 RUQ PAIN: ICD-10-CM

## 2020-01-24 DIAGNOSIS — R51.9 CHRONIC NONINTRACTABLE HEADACHE, UNSPECIFIED HEADACHE TYPE: ICD-10-CM

## 2020-01-24 DIAGNOSIS — J06.9 VIRAL UPPER RESPIRATORY TRACT INFECTION: Primary | ICD-10-CM

## 2020-01-24 DIAGNOSIS — I10 ESSENTIAL HYPERTENSION: ICD-10-CM

## 2020-01-24 DIAGNOSIS — G89.29 CHRONIC NONINTRACTABLE HEADACHE, UNSPECIFIED HEADACHE TYPE: ICD-10-CM

## 2020-01-24 DIAGNOSIS — K20.0 EOSINOPHILIC ESOPHAGITIS: ICD-10-CM

## 2020-01-24 PROCEDURE — 99214 OFFICE O/P EST MOD 30 MIN: CPT | Performed by: NURSE PRACTITIONER

## 2020-01-24 RX ORDER — HYDROCHLOROTHIAZIDE 12.5 MG/1
12.5 TABLET ORAL DAILY
Qty: 30 TABLET | Refills: 11 | Status: SHIPPED | OUTPATIENT
Start: 2020-01-24 | End: 2021-01-12

## 2020-01-24 RX ORDER — DEXTROMETHORPHAN HYDROBROMIDE AND PROMETHAZINE HYDROCHLORIDE 15; 6.25 MG/5ML; MG/5ML
5 SYRUP ORAL NIGHTLY PRN
Qty: 60 ML | Refills: 0 | Status: SHIPPED | OUTPATIENT
Start: 2020-01-24 | End: 2020-07-14

## 2020-01-24 NOTE — PROGRESS NOTES
Chief Complaint   Patient presents with   • Anxiety   • URI   • Abdominal Pain     wants to see gastro   • Hypertension   • Headache       HPI:  49 y.o.female presents for anxiety, upper respiratory infection, abdominal pain, headache and hypertension.    Anxiety   Presents for follow-up visit. Symptoms include dizziness, nervous/anxious behavior, palpitations and panic. Patient reports no decreased concentration, depressed mood, excessive worry, nausea or suicidal ideas. Primary symptoms comment: Has had one panic attack since last visit. Symptoms occur rarely. The severity of symptoms is moderate. The quality of sleep is good. Nighttime awakenings: one to two.     Compliance with medications is %.   URI    This is a new problem. The current episode started in the past 7 days. The problem has been gradually worsening. There has been no fever. Associated symptoms include congestion, headaches, rhinorrhea and sneezing. Pertinent negatives include no nausea or vomiting. Associated symptoms comments: Body aches. Productive cough (white-yellow). She has tried acetaminophen, antihistamine and decongestant for the symptoms. The treatment provided mild relief.   Abdominal Pain   This is a recurrent problem. The current episode started more than 1 year ago. The onset quality is undetermined. The problem occurs daily. Duration: off and on all day. The problem has been unchanged. The pain is located in the RUQ. The pain is mild. The quality of the pain is dull. The abdominal pain does not radiate. Associated symptoms include flatus and headaches. Pertinent negatives include no belching, constipation, frequency, hematochezia, hematuria, melena, nausea, vomiting or weight loss. The pain is aggravated by eating. The pain is relieved by nothing. The treatment provided no relief. Her past medical history is significant for abdominal surgery and irritable bowel syndrome.   Hypertension   This is a chronic problem. The current  episode started more than 1 year ago. The problem has been gradually improving since onset. The problem is controlled. Associated symptoms include anxiety, headaches and palpitations. Pertinent negatives include no blurred vision, malaise/fatigue or peripheral edema. (Chest pain is from her esophagus) Risk factors for coronary artery disease include obesity and post-menopausal state. Past treatments include diuretics. Current antihypertension treatment includes diuretics. The current treatment provides moderate improvement. There are no compliance problems.  There is no history of angina.   Headache    This is a chronic problem. The current episode started more than 1 year ago. The problem occurs intermittently (Lasts for 3-4 days). The problem has been unchanged. Pain location: left unilateral face forehead, maxillary, down fac. The pain radiates to the face (Can't touch hair, face. ). Quality: Burning pain, sometimes shooting. Face is very sensitive. The pain is at a severity of 7/10. The pain is severe. Associated symptoms include dizziness, phonophobia, photophobia, rhinorrhea, scalp tenderness and tinnitus. Pertinent negatives include no blurred vision, nausea, tingling, vomiting or weight loss. Exacerbated by: Touching her hair, face, eye. She has tried acetaminophen for the symptoms. The treatment provided no relief.     Esophageal esophagitis; PPIs do not help. Has previously taken Levsin for EOE with help. Has had previous endoscopies and is due for another one.    Review of Systems   Constitutional: Positive for fatigue. Negative for appetite change, malaise/fatigue and unexpected weight loss.   HENT: Positive for congestion, postnasal drip, rhinorrhea, sneezing, tinnitus and voice change.    Eyes: Positive for photophobia. Negative for blurred vision.   Cardiovascular: Positive for palpitations.   Gastrointestinal: Positive for abdominal distention, flatus and GERD. Negative for anal bleeding, blood in  stool, constipation, hematochezia, melena, nausea, vomiting and indigestion.        EOE.   Genitourinary: Negative for decreased urine volume, frequency, hematuria, urgency and urinary incontinence.   Neurological: Positive for dizziness. Negative for tingling.   Psychiatric/Behavioral: Negative for decreased concentration, suicidal ideas and depressed mood. The patient is nervous/anxious.          [facility]  The following portions of the patient's history were reviewed and updated as appropriate: allergies, current medications, past family history, past medical history, past social history, past surgical history and problem list.     Past Medical History:   Diagnosis Date   • Abdominal pain    • Abdominal pain, RUQ     Check cmp. refer to [facility] u/s and gen surg eval d/t cholelithiasis noted on CT of abd/pelvis donein ER    • Acute bronchitis     Take cefdinir and medrol dose pack as directed. Use otc mucinex. Continue to rest and push fluids. Call or rtc if no better. Gave phenergan w/ codeine cough syrup 5 ml po prn #120ml, no RF per Dr. Cesar   • Acute pharyngitis     Check for monod/t exposure/ Pt will restart flovent as used for esophagitis. If no improvement take the medrol dose pack she was given at least visit   • Acute sinusitis    • Acute upper respiratory infection    • Anxiety    • Body aches    • Cholelithiasis     Reviewed CT of adb/pelvis from ER  showed cholelithiasis. Will refer for gen surgery eval and [facility] u/s   • Chronic pain disorder    • Cough    • Eosinophilic esophagitis    • Extremity pain    • Gestational diabetes    • Headache    • Influenza    • Labyrinthitis    • Low back pain    • Lumbar strain    • Lumbosacral disc disease    • Neck pain    • Right hip pain    • Shingles    • Strain of thoracic region    • Viral infection    • Viral pharyngitis       Past Surgical History:   Procedure Laterality Date   • BREAST BIOPSY Left 10/2018    Benign STEREO    •  SECTION      89,  9/21/00, 05/18/04, 02/06/12   • DILATATION AND CURETTAGE     • ENDOSCOPY     • OOPHORECTOMY Right 08/2017   • WISDOM TOOTH EXTRACTION        Allergies   Allergen Reactions   • Maxalt [Rizatriptan] Anaphylaxis   • Sulfa Antibiotics Rash   • Sumatriptan Anaphylaxis   • Zolmitriptan Anaphylaxis   • Labetalol Rash      Social History     Socioeconomic History   • Marital status:      Spouse name: Not on file   • Number of children: Not on file   • Years of education: Not on file   • Highest education level: Not on file   Occupational History   • Occupation:    Tobacco Use   • Smoking status: Former Smoker     Packs/day: 1.00     Years: 3.00     Pack years: 3.00     Types: Cigarettes     Last attempt to quit: 8/5/2009     Years since quitting: 10.4   • Smokeless tobacco: Never Used   Substance and Sexual Activity   • Alcohol use: No   • Drug use: No   • Sexual activity: Defer   Lifestyle   • Physical activity:     Days per week: 0 days     Minutes per session: Not on file   • Stress: Very much   Social History Narrative    Caffeine: 2 serving per day.     Family History   Problem Relation Age of Onset   • Arthritis Mother    • Hypertension Mother    • Thyroid disease Mother    • Arthritis Father    • Hypertension Father    • Heart disease Father    • Heart attack Other    • Arthritis Other    • Hypertension Other    • Migraines Other    • Stroke Other    • Breast cancer Other 31   • Ovarian cancer Cousin 42   • Heart failure Paternal Grandmother    • Endometrial cancer Neg Hx    • Colon cancer Neg Hx             Current Outpatient Medications:   •  albuterol sulfate  (90 Base) MCG/ACT inhaler, Inhale 2 puffs Every 4 (Four) Hours As Needed for Wheezing., Disp: 1 inhaler, Rfl: 0  •  ALPRAZolam (XANAX) 0.25 MG tablet, Take 1 tablet by mouth At Night As Needed for Anxiety or Sleep. One po bid prn, Disp: 30 tablet, Rfl: 0  •  buPROPion XL (WELLBUTRIN XL) 150 MG 24 hr tablet, take 1 tablet by mouth  "every morning, Disp: 30 tablet, Rfl: 5  •  Cetirizine HCl 10 MG capsule, , Disp: , Rfl:   •  Ergocalciferol (VITAMIN D2 PO), Take 1 capsule by mouth., Disp: , Rfl:   •  fluticasone (FLONASE) 50 MCG/ACT nasal spray, 2 sprays into the nostril(s) as directed by provider., Disp: , Rfl:   •  fluticasone (FLOVENT HFA) 220 MCG/ACT inhaler, Spray 2 inhalations by mouth and swallow powder two times per day. Rinse mouth with water and spit after use, Disp: 4 inhaler, Rfl: 0  •  hydroCHLOROthiazide (HYDRODIURIL) 12.5 MG tablet, take 1 tablet by mouth once daily, Disp: 30 tablet, Rfl: 2  •  L-Methylfolate-Algae 15-90.314 MG capsule, take 1 capsule by mouth daily, Disp: 30 capsule, Rfl: 0  •  meloxicam (MOBIC) 15 MG tablet, 1 PO Daily with food., Disp: 60 tablet, Rfl: 0  •  metoclopramide (REGLAN) 10 MG tablet, Take 1 tablet by mouth 4 (Four) Times a Day As Needed (nausea associated migraine). (Patient taking differently: Take 15 mg by mouth 4 (Four) Times a Day As Needed (nausea associated migraine).), Disp: 30 tablet, Rfl: 5  •  Multiple Vitamins-Minerals (MULTIVITAMIN ADULT PO), Take  by mouth., Disp: , Rfl:   •  Multiple Vitamins-Minerals (MULTIVITAMIN WITH MINERALS) tablet tablet, Take 1 tablet by mouth Daily., Disp: , Rfl:   •  ondansetron (ZOFRAN) 4 MG tablet, take 1 tablet by mouth every 8 hours if needed for nausea and vomiting, Disp: 14 tablet, Rfl: 1  •  promethazine (PHENERGAN) 25 MG tablet, Take 1 tablet by mouth Every 8 (Eight) Hours As Needed for Nausea or Vomiting., Disp: 21 tablet, Rfl: 0  •  propranolol (INDERAL) 10 MG tablet, Take 1 tablet by mouth 2 (Two) Times a Day., Disp: 60 tablet, Rfl: 11  •  Turmeric, Curcuma Longa, (TURMERIC ROOT) powder, , Disp: , Rfl:   •  vitamin D (ERGOCALCIFEROL) 61057 units capsule capsule, Take 1 capsule by mouth 1 (One) Time Per Week., Disp: , Rfl: 0    VITALS:  /80   Pulse 83   Temp 98.6 °F (37 °C)   Ht 160 cm (63\")   Wt 81.6 kg (180 lb)   SpO2 98%   BMI 31.89 " kg/m²     Physical Exam   Constitutional: She is oriented to person, place, and time. She appears well-developed and well-nourished. No distress.   HENT:   Head: Normocephalic.   Right Ear: Tympanic membrane, external ear and ear canal normal.   Left Ear: External ear and ear canal normal. Tympanic membrane is retracted.   Nose: Mucosal edema and congestion present. No rhinorrhea. Right sinus exhibits no maxillary sinus tenderness and no frontal sinus tenderness. Left sinus exhibits no maxillary sinus tenderness and no frontal sinus tenderness.   Mouth/Throat: Oropharynx is clear and moist and mucous membranes are normal.   Eyes: Pupils are equal, round, and reactive to light. Conjunctivae are normal.   Neck: Normal range of motion. Neck supple. No thyromegaly present.   Cardiovascular: Normal rate, regular rhythm and normal heart sounds.   No edema   Pulmonary/Chest: Effort normal and breath sounds normal. No respiratory distress. She exhibits no tenderness.   Abdominal: Soft. Bowel sounds are normal. She exhibits no distension and no mass. There is no hepatosplenomegaly. There is tenderness in the right upper quadrant. There is no rigidity, no rebound, no guarding and no CVA tenderness. No hernia.   Negative rovsing and obturator sign.   Lymphadenopathy:        Head (right side): No submental, no submandibular and no tonsillar adenopathy present.        Head (left side): No submental, no submandibular and no tonsillar adenopathy present.     She has no cervical adenopathy.   Neurological: She is alert and oriented to person, place, and time. She has normal strength. No cranial nerve deficit or sensory deficit. Coordination normal. GCS eye subscore is 4. GCS verbal subscore is 5. GCS motor subscore is 6.   Skin: Skin is warm and dry. No rash noted.   No jaundice   Psychiatric: She has a normal mood and affect. Her behavior is normal.   Vitals reviewed.      LABS  pending    ASSESSMENT/PLAN  Gino was seen today for  anxiety, uri and abdominal pain.    Diagnoses and all orders for this visit:    Viral upper respiratory tract infection  Comments:  cont oral antihistamine  Orders:  -     promethazine-dextromethorphan (PROMETHAZINE-DM) 6.25-15 MG/5ML syrup; Take 5 mL by mouth At Night As Needed for Cough.  Cough: Probable viral in nature. Use saline spray, Promethazine-DM as need. Instructed pt that it may cause drowsiness.  Essential hypertension  Comments:  controlled  Orders:  -     hydroCHLOROthiazide (HYDRODIURIL) 12.5 MG tablet; Take 1 tablet by mouth Daily.  HTN Controlled. Continue diuretic as prescribed. Limit sodium intake to <1500 mg/day. Avoid processed foods.  Eosinophilic esophagitis  -     Ambulatory Referral to Gastroenterology    Chronic nonintractable headache, unspecified headache type  Comments:  migraine vs trigeminal neuralgia  Orders:  -     Ambulatory Referral to Neurology    RUQ pain  -     Ambulatory Referral to Gastroenterology  -     Comprehensive Metabolic Panel  -     CBC & Differential  Has had prior luz marina.    I discussed the patients findings and my recommendations with patient.  Patient was encouraged to keep me informed of any acute changes, lack of improvement, or any new concerning symptoms.  Patient voiced understanding of all instructions and denied further questions.      FOLLOW-UP  Return in about 6 months (around 7/24/2020), or if symptoms worsen or fail to improve, for Recheck bp.    Electronically signed by:    CLAY Bagley  01/24/2020    EMR Dragon/Transcription Disclaimer:  Much of this encounter note is an electronic transcription/translation of spoken language to printed text.  The electronic translation of spoken language may permit erroneous, or at times, nonsensical words or phrases to be inadvertently transcribed.  Although I have reviewed the note for such errors, some may still exist

## 2020-01-25 LAB
ALBUMIN SERPL-MCNC: 4.7 G/DL (ref 3.8–4.9)
ALBUMIN/GLOB SERPL: 1.7 {RATIO} (ref 1.2–2.2)
ALP SERPL-CCNC: 102 IU/L (ref 39–117)
ALT SERPL-CCNC: 30 IU/L (ref 0–32)
AMBIG ABBREV CMP14 DEFAULT: NORMAL
AST SERPL-CCNC: 29 IU/L (ref 0–40)
BASOPHILS # BLD AUTO: 0 X10E3/UL (ref 0–0.2)
BASOPHILS NFR BLD AUTO: 0 %
BILIRUB SERPL-MCNC: 0.5 MG/DL (ref 0–1.2)
BUN SERPL-MCNC: 15 MG/DL (ref 6–24)
BUN/CREAT SERPL: 16 (ref 9–23)
CALCIUM SERPL-MCNC: 9.7 MG/DL (ref 8.7–10.2)
CHLORIDE SERPL-SCNC: 99 MMOL/L (ref 96–106)
CO2 SERPL-SCNC: 22 MMOL/L (ref 20–29)
CREAT SERPL-MCNC: 0.96 MG/DL (ref 0.57–1)
EOSINOPHIL # BLD AUTO: 0.3 X10E3/UL (ref 0–0.4)
EOSINOPHIL NFR BLD AUTO: 3 %
ERYTHROCYTE [DISTWIDTH] IN BLOOD BY AUTOMATED COUNT: 13.8 % (ref 11.7–15.4)
GLOBULIN SER CALC-MCNC: 2.8 G/DL (ref 1.5–4.5)
GLUCOSE SERPL-MCNC: 85 MG/DL (ref 65–99)
HCT VFR BLD AUTO: 42.7 % (ref 34–46.6)
HGB BLD-MCNC: 14.2 G/DL (ref 11.1–15.9)
IMM GRANULOCYTES # BLD AUTO: 0 X10E3/UL (ref 0–0.1)
IMM GRANULOCYTES NFR BLD AUTO: 0 %
LYMPHOCYTES # BLD AUTO: 2.4 X10E3/UL (ref 0.7–3.1)
LYMPHOCYTES NFR BLD AUTO: 24 %
MCH RBC QN AUTO: 30.8 PG (ref 26.6–33)
MCHC RBC AUTO-ENTMCNC: 33.3 G/DL (ref 31.5–35.7)
MCV RBC AUTO: 93 FL (ref 79–97)
MONOCYTES # BLD AUTO: 0.7 X10E3/UL (ref 0.1–0.9)
MONOCYTES NFR BLD AUTO: 7 %
NEUTROPHILS # BLD AUTO: 6.5 X10E3/UL (ref 1.4–7)
NEUTROPHILS NFR BLD AUTO: 66 %
PLATELET # BLD AUTO: 382 X10E3/UL (ref 150–450)
POTASSIUM SERPL-SCNC: 5 MMOL/L (ref 3.5–5.2)
PROT SERPL-MCNC: 7.5 G/DL (ref 6–8.5)
RBC # BLD AUTO: 4.61 X10E6/UL (ref 3.77–5.28)
SODIUM SERPL-SCNC: 140 MMOL/L (ref 134–144)
WBC # BLD AUTO: 9.9 X10E3/UL (ref 3.4–10.8)

## 2020-06-26 ENCOUNTER — TELEMEDICINE (OUTPATIENT)
Dept: FAMILY MEDICINE CLINIC | Facility: TELEHEALTH | Age: 50
End: 2020-06-26

## 2020-06-26 DIAGNOSIS — T78.40XA ALLERGIC REACTION, INITIAL ENCOUNTER: Primary | ICD-10-CM

## 2020-06-26 PROCEDURE — 99213 OFFICE O/P EST LOW 20 MIN: CPT | Performed by: NURSE PRACTITIONER

## 2020-06-26 RX ORDER — METHYLPREDNISOLONE 4 MG/1
TABLET ORAL
Qty: 21 TABLET | Refills: 0 | Status: SHIPPED | OUTPATIENT
Start: 2020-06-26 | End: 2020-07-14

## 2020-06-26 RX ORDER — BUPROPION HYDROCHLORIDE 300 MG/1
TABLET ORAL
COMMUNITY
Start: 2020-04-16 | End: 2020-07-14

## 2020-06-26 RX ORDER — LORATADINE 10 MG/1
10 TABLET ORAL DAILY
COMMUNITY
End: 2020-12-28

## 2020-06-26 RX ORDER — BUPROPION HYDROCHLORIDE 150 MG/1
150 TABLET ORAL DAILY
COMMUNITY

## 2020-06-26 NOTE — PROGRESS NOTES
Gino Maguire    1970  [id number]        Questionnaire:   --symptoms: No COVID 19 symptoms  --exposure to flu or COVID-19 in past 14 days:  No   --sudden loss of taste or smell:  no      HPI  Mrs. Gino Maguire is a 49 yo female who has a complaint of an allergic reaction. She reports her hairstylist applied something to her hair along with dye and she started having itchy bumps on her scalp, forehead and cheeks. She noticed this 2-3 days ago and has been using hydrocortisone cream along with her loratadine with no improvements. She ask about shampoos to help itching.   She reports she had a history of esophagitis, but does take NSAIDs and steroids as needed. She does not take any antacids or PPI, but can if needed. She takes Flovent for spasms of esophagus.       Review of Systems - Allergy and Immunology ROS: positive for - red itchy bumps on forehead, cheeks, scalp   negative for - hives, insect bite sensitivity, itchy/watery eyes, latex, nasal congestion, postnasal drip, seasonal allergies or respiratory distress     PE:  Constitutional-Well developed, well nourished female that does not appear to be in any distress  Neurological-She is alert and oriented   Psyc-mood and affect normal  Respiratory-Respirations non-labored with no distress.   Skin-redness of cheeks and forehead, small bumps noted in the scalp with some mild redness and excoriations at area shown behind the ears and the top of the scalp. She reports the same over the whole scalp.       Diagnoses and all orders for this visit:    Allergic reaction, initial encounter    Other orders  -     methylPREDNISolone (MEDROL, CJ,) 4 MG tablet; Take as directed on package instructions.        Any medications prescribed have been sent electronically to   [id number] - [place], [place] - [address] AT [facility] - [phone]  - [phone]   [address] [facility] [address]  Phone: [phone] Fax:  [phone]    If symptoms do not improve in 24-48 hours follow up with primary care physician. May try Selsun Blue, Head and Shoulders or other shampoos for scalp itching to relieve itch. Can continue hydrocortisone cream on scalp and forehead but must limit this to more days.   Follow up with urgent care or pcp if other symptoms develop   If she has any reflux or esophageal irritation she is to stop the steroid and see her pcp  She voices understanding of instructions.       Time Documentation  Counseled patient  Counseling topics: diagnosis, treatment options, follow up plan and return instructions  Total encounter time: counseling time more than 50% of visit: 20 minutes        CLAY Allen  06/26/20  11:45 AM    This visit was performed via Telehealth.  This patient has been instructed to follow-up with their primary care provider if their symptoms worsen or the treatment provided does not resolve their illness.

## 2020-06-26 NOTE — PATIENT INSTRUCTIONS
If symptoms do not improve in 24-48 hours follow up with primary care physician. May try Selsun Blue, Head and Shoulders or other shampoos for scalp itching to relieve itch. Can continue hydrocortisone cream on scalp and forehead but must limit this to more days.   Follow up with urgent care or pcp if other symptoms develop   If she has any reflux or esophageal irritation she is to stop the steroid and see her pcp      Contact Dermatitis  Dermatitis is redness, soreness, and swelling (inflammation) of the skin. Contact dermatitis is a reaction to certain substances that touch the skin. Many different substances can cause contact dermatitis. There are two types of contact dermatitis:  · Irritant contact dermatitis. This type is caused by something that irritates your skin, such as having dry hands from washing them too often with soap. This type does not require previous exposure to the substance for a reaction to occur. This is the most common type.  · Allergic contact dermatitis. This type is caused by a substance that you are allergic to, such as poison ivy. This type occurs when you have been exposed to the substance (allergen) and develop a sensitivity to it. Dermatitis may develop soon after your first exposure to the allergen, or it may not develop until the next time you are exposed and every time thereafter.  What are the causes?  Irritant contact dermatitis is most commonly caused by exposure to:  · Makeup.  · Soaps.  · Detergents.  · Bleaches.  · Acids.  · Metal salts, such as nickel.  Allergic contact dermatitis is most commonly caused by exposure to:  · Poisonous plants.  · Chemicals.  · Jewelry.  · Latex.  · Medicines.  · Preservatives in products, such as clothing.  What increases the risk?  You are more likely to develop this condition if you have:  · A job that exposes you to irritants or allergens.  · Certain medical conditions, such as asthma or eczema.  What are the signs or symptoms?  Symptoms of  this condition may occur on your body anywhere the irritant has touched you or is touched by you.  · Symptoms include:  ? Dryness or flaking.  ? Redness.  ? Cracks.  ? Itching.  ? Pain or a burning feeling.  ? Blisters.  ? Drainage of small amounts of blood or clear fluid from skin cracks.  With allergic contact dermatitis, there may also be swelling in areas such as the eyelids, mouth, or genitals.  How is this diagnosed?  This condition is diagnosed with a medical history and physical exam.  · A patch skin test may be performed to help determine the cause.  · If the condition is related to your job, you may need to see an occupational medicine specialist.  How is this treated?  This condition is treated by checking for the cause of the reaction and protecting your skin from further contact. Treatment may also include:  · Steroid creams or ointments. Oral steroid medicines may be needed in more severe cases.  · Antibiotic medicines or antibacterial ointments, if a skin infection is present.  · Antihistamine lotion or an antihistamine taken by mouth to ease itching.  · A bandage (dressing).  Follow these instructions at home:  Skin care  · Moisturize your skin as needed.  · Apply cool compresses to the affected areas.  · Try applying baking soda paste to your skin. Stir water into baking soda until it reaches a paste-like consistency.  · Do not scratch your skin, and avoid friction to the affected area.  · Avoid the use of soaps, perfumes, and dyes.  Medicines  · Take or apply over-the-counter and prescription medicines only as told by your health care provider.  · If you were prescribed an antibiotic medicine, take or apply the antibiotic as told by your health care provider. Do not stop using the antibiotic even if your condition improves.  Bathing  · Try taking a bath with:  ? Epsom salts. Follow the instructions on the packaging. You can get these at your local pharmacy or grocery store.  ? Baking soda. Pour a  small amount into the bath as directed by your health care provider.  ? Colloidal oatmeal. Follow the instructions on the packaging. You can get this at your local pharmacy or grocery store.  · Bathe less frequently, such as every other day.  · Bathe in lukewarm water. Avoid using hot water.  Bandage care  · If you were given a bandage (dressing), change it as told by your health care provider.  · Wash your hands with soap and water before and after you change your dressing. If soap and water are not available, use hand .  General instructions  · Avoid the substance that caused your reaction. If you do not know what caused it, keep a journal to try to track what caused it. Write down:  ? What you eat.  ? What cosmetic products you use.  ? What you drink.  ? What you wear in the affected area. This includes jewelry.  · Check the affected areas every day for signs of infection. Check for:  ? More redness, swelling, or pain.  ? More fluid or blood.  ? Warmth.  ? Pus or a bad smell.  · Keep all follow-up visits as told by your health care provider. This is important.  Contact a health care provider if:  · Your condition does not improve with treatment.  · Your condition gets worse.  · You have signs of infection such as swelling, tenderness, redness, soreness, or warmth in the affected area.  · You have a fever.  · You have new symptoms.  Get help right away if:  · You have a severe headache, neck pain, or neck stiffness.  · You vomit.  · You feel very sleepy.  · You notice red streaks coming from the affected area.  · Your bone or joint underneath the affected area becomes painful after the skin has healed.  · The affected area turns darker.  · You have difficulty breathing.  Summary  · Dermatitis is redness, soreness, and swelling (inflammation) of the skin. Contact dermatitis is a reaction to certain substances that touch the skin.  · Symptoms of this condition may occur on your body anywhere the irritant  has touched you or is touched by you.  · This condition is treated by figuring out what caused the reaction and protecting your skin from further contact. Treatment may also include medicines and skin care.  · Avoid the substance that caused your reaction. If you do not know what caused it, keep a journal to try to track what caused it.  · Contact a health care provider if your condition gets worse or you have signs of infection such as swelling, tenderness, redness, soreness, or warmth in the affected area.  This information is not intended to replace advice given to you by your health care provider. Make sure you discuss any questions you have with your health care provider.  Document Released: 12/15/2001 Document Revised: 04/08/2020 Document Reviewed: 07/03/2019  [employer]vier Patient Education © 2020 [employer]

## 2020-07-06 RX ORDER — PROPRANOLOL HYDROCHLORIDE 10 MG/1
TABLET ORAL
Qty: 60 TABLET | Refills: 11 | OUTPATIENT
Start: 2020-07-06

## 2020-07-14 ENCOUNTER — OFFICE VISIT (OUTPATIENT)
Dept: INTERNAL MEDICINE | Facility: CLINIC | Age: 50
End: 2020-07-14

## 2020-07-14 VITALS
SYSTOLIC BLOOD PRESSURE: 120 MMHG | OXYGEN SATURATION: 98 % | WEIGHT: 185 LBS | DIASTOLIC BLOOD PRESSURE: 70 MMHG | HEART RATE: 87 BPM | TEMPERATURE: 97.1 F | HEIGHT: 63 IN | BODY MASS INDEX: 32.78 KG/M2

## 2020-07-14 DIAGNOSIS — R05.9 COUGH: ICD-10-CM

## 2020-07-14 DIAGNOSIS — R11.0 NAUSEA: ICD-10-CM

## 2020-07-14 DIAGNOSIS — R21 RASH AND NONSPECIFIC SKIN ERUPTION: ICD-10-CM

## 2020-07-14 DIAGNOSIS — F41.9 ANXIETY: ICD-10-CM

## 2020-07-14 DIAGNOSIS — I10 ESSENTIAL HYPERTENSION: Primary | ICD-10-CM

## 2020-07-14 PROCEDURE — 99214 OFFICE O/P EST MOD 30 MIN: CPT | Performed by: NURSE PRACTITIONER

## 2020-07-14 RX ORDER — LANOLIN ALCOHOL/MO/W.PET/CERES
1000 CREAM (GRAM) TOPICAL DAILY
COMMUNITY
End: 2021-10-19

## 2020-07-14 RX ORDER — HYDROCHLOROTHIAZIDE 12.5 MG/1
12.5 TABLET ORAL DAILY
Qty: 30 TABLET | Refills: 11 | Status: CANCELLED | OUTPATIENT
Start: 2020-07-14

## 2020-07-14 RX ORDER — PROMETHAZINE HYDROCHLORIDE 25 MG/1
25 TABLET ORAL EVERY 8 HOURS PRN
Qty: 21 TABLET | Refills: 2 | Status: SHIPPED | OUTPATIENT
Start: 2020-07-14

## 2020-07-14 RX ORDER — ALBUTEROL SULFATE 90 UG/1
2 AEROSOL, METERED RESPIRATORY (INHALATION) EVERY 4 HOURS PRN
Qty: 1 INHALER | Refills: 5 | Status: SHIPPED | OUTPATIENT
Start: 2020-07-14 | End: 2021-01-18 | Stop reason: SDUPTHER

## 2020-07-14 RX ORDER — PROPRANOLOL HYDROCHLORIDE 10 MG/1
10 TABLET ORAL 2 TIMES DAILY
Qty: 60 TABLET | Refills: 11 | Status: SHIPPED | OUTPATIENT
Start: 2020-07-14 | End: 2021-07-19

## 2020-07-14 NOTE — PROGRESS NOTES
Chief Complaint   Patient presents with   • Hypertension   • Anxiety   • Nausea   • Rash   50 y.o.female presents for hypertension, Anxiety, Nausea, and Rash    Hypertension   This is a chronic problem. The current episode started more than 1 year ago. The problem has been rapidly improving since onset. The problem is controlled. Associated symptoms include anxiety and palpitations. Pertinent negatives include no blurred vision or shortness of breath. Agents associated with hypertension include NSAIDs. Risk factors for coronary artery disease include family history, obesity and post-menopausal state. Past treatments include diuretics and beta blockers. Current antihypertension treatment includes beta blockers, diuretics and lifestyle changes. The current treatment provides significant improvement. There are no compliance problems.    Anxiety   Presents for follow-up visit. Symptoms include dizziness, nausea and palpitations. Patient reports no shortness of breath or suicidal ideas. Symptoms occur rarely. The severity of symptoms is mild. The quality of sleep is good. Nighttime awakenings: none.    Compliance with medications is %. Treatment side effects: No side effects.   Nausea   This is a chronic problem. The current episode started more than 1 year ago. The problem occurs rarely. The problem has been gradually improving. Associated symptoms include nausea and a rash. Pertinent negatives include no chills, fatigue or fever. The symptoms are aggravated by eating and drinking. She has tried changing her diet of eating and drinking for symptom management. The treatment provided moderate relief. Patient also states that when she gets migraine headaches, she becomes severly nauseated and uses the phenergan at that time.    Rash   This is a new problem. The current episode started more than 1 month ago. The problem is unchanged. The affected locations include the chest. The rash is characterized by itchiness. She  was exposed to nothing. Pertinent negatives include no fatigue, fever or shortness of breath. Past treatments include antihistamine and anti-itch cream. The treatment provided no relief. Her past medical history is significant for allergies, asthma and varicella. Since she has a sulfa allergy, the patient inquired if the HCTZ would cause the rash.     Uses inhaler rarely when gets sick/cough and likes to have available.     Review of Systems   Constitutional: Negative for chills, fatigue and fever.   Eyes: Negative for blurred vision and visual disturbance.   Respiratory: Negative for shortness of breath.    Cardiovascular: Positive for palpitations. Negative for leg swelling.   Gastrointestinal: Positive for nausea.   Genitourinary: Negative for difficulty urinating.   Skin: Positive for rash.   Neurological: Positive for dizziness. Negative for syncope, light-headedness and headache.   Psychiatric/Behavioral: Negative for suicidal ideas.         [facility]  The following portions of the patient's history were reviewed and updated as appropriate: allergies, current medications, past family history, past medical history, past social history, past surgical history and problem list.     Past Medical History:   Diagnosis Date   • Abdominal pain    • Abdominal pain, RUQ     Check cmp. refer to [facility] u/s and gen surg eval d/t cholelithiasis noted on CT of abd/pelvis donein ER 1/16   • Acute bronchitis     Take cefdinir and medrol dose pack as directed. Use otc mucinex. Continue to rest and push fluids. Call or rtc if no better. Gave phenergan w/ codeine cough syrup 5 ml po prn #120ml, no RF per Dr. Cesar   • Acute pharyngitis     Check for monod/t exposure/ Pt will restart flovent as used for esophagitis. If no improvement take the medrol dose pack she was given at least visit   • Acute sinusitis    • Acute upper respiratory infection    • Anxiety    • Body aches    • Cholelithiasis     Reviewed CT of adb/pelvis from ER 1/16  showed cholelithiasis. Will refer for gen surgery eval and [facility] u/s   • Chronic pain disorder    • Cough    • Eosinophilic esophagitis    • Extremity pain    • Gestational diabetes    • Headache    • Influenza    • Labyrinthitis    • Low back pain    • Lumbar strain    • Lumbosacral disc disease    • Neck pain    • Right hip pain    • Shingles    • Strain of thoracic region    • Viral infection    • Viral pharyngitis       Past Surgical History:   Procedure Laterality Date   • BREAST BIOPSY Left 10/2018    Benign STEREO    •  SECTION      97, 00, 04, 12   • DILATATION AND CURETTAGE     • ENDOSCOPY     • OOPHORECTOMY Right 2017   • WISDOM TOOTH EXTRACTION        Allergies   Allergen Reactions   • Maxalt [Rizatriptan] Anaphylaxis   • Sulfa Antibiotics Rash   • Sumatriptan Anaphylaxis   • Zolmitriptan Anaphylaxis   • Labetalol Rash      Social History     Socioeconomic History   • Marital status:    Occupational History   • Occupation:    Tobacco Use   • Smoking status: Former Smoker     Packs/day: 1.00     Years: 3.00     Pack years: 3.00     Types: Cigarettes     Last attempt to quit: 2009     Years since quitting: 10.9   • Smokeless tobacco: Never Used   Substance and Sexual Activity   • Alcohol use: Yes     Comment: social   • Drug use: No   • Sexual activity: Defer   Lifestyle   • Physical activity:     Days per week: 0 days     Minutes per session: Not on file   • Stress: Very much   Social History Narrative    Caffeine: 2 serving per day.     Family History   Problem Relation Age of Onset   • Arthritis Mother    • Hypertension Mother    • Thyroid disease Mother    • Arthritis Father    • Hypertension Father    • Heart disease Father    • Heart attack Other    • Arthritis Other    • Hypertension Other    • Migraines Other    • Stroke Other    • Breast cancer Other 31   • Ovarian cancer Cousin 42   • Heart failure Paternal Grandmother    • Endometrial cancer Neg  "Hx    • Colon cancer Neg Hx             Current Outpatient Medications:   •  albuterol sulfate  (90 Base) MCG/ACT inhaler, Inhale 2 puffs Every 4 (Four) Hours As Needed for Wheezing., Disp: 1 inhaler, Rfl: 0  •  ALPRAZolam (XANAX) 0.25 MG tablet, Take 1 tablet by mouth At Night As Needed for Anxiety or Sleep. One po bid prn, Disp: 30 tablet, Rfl: 0  •  buPROPion XL (Wellbutrin XL) 150 MG 24 hr tablet, Take 150 mg by mouth Daily., Disp: , Rfl:   •  Ergocalciferol (VITAMIN D2 PO), Take 1 capsule by mouth., Disp: , Rfl:   •  fluticasone (FLONASE) 50 MCG/ACT nasal spray, 2 sprays into the nostril(s) as directed by provider., Disp: , Rfl:   •  hydroCHLOROthiazide (HYDRODIURIL) 12.5 MG tablet, Take 1 tablet by mouth Daily., Disp: 30 tablet, Rfl: 11  •  loratadine (CLARITIN) 10 MG tablet, Take 10 mg by mouth Daily., Disp: , Rfl:   •  meloxicam (MOBIC) 15 MG tablet, 1 PO Daily with food., Disp: 60 tablet, Rfl: 0  •  Multiple Vitamins-Minerals (MULTIVITAMIN ADULT PO), Take  by mouth., Disp: , Rfl:   •  promethazine (PHENERGAN) 25 MG tablet, Take 1 tablet by mouth Every 8 (Eight) Hours As Needed for Nausea or Vomiting., Disp: 21 tablet, Rfl: 0  •  propranolol (INDERAL) 10 MG tablet, Take 1 tablet by mouth 2 (Two) Times a Day., Disp: 60 tablet, Rfl: 11  •  vitamin B-12 (CYANOCOBALAMIN) 1000 MCG tablet, Take 1,000 mcg by mouth Daily., Disp: , Rfl:     VITALS:  /70   Pulse 87   Temp 97.1 °F (36.2 °C)   Ht 160 cm (63\")   Wt 83.9 kg (185 lb)   LMP  (LMP Unknown)   SpO2 98%   BMI 32.77 kg/m²     Physical Exam   Constitutional: She is oriented to person, place, and time. She appears well-developed and well-nourished. No distress.   HENT:   Head: Normocephalic.   Right Ear: External ear normal.   Left Ear: External ear normal.   Nose: Nose normal.   Mouth/Throat: Oropharynx is clear and moist.   Eyes: Pupils are equal, round, and reactive to light. Conjunctivae and EOM are normal. Right eye exhibits no discharge. " Left eye exhibits no discharge.   Neck: Normal range of motion. Neck supple. No thyromegaly present.   Cardiovascular: Normal rate, regular rhythm, normal heart sounds and intact distal pulses.   No edema   Pulmonary/Chest: Effort normal and breath sounds normal. No respiratory distress.   Abdominal: Soft. Bowel sounds are normal. There is no tenderness.   Musculoskeletal: Normal range of motion. She exhibits no edema.   Normal ROM all major joints   Lymphadenopathy:     She has no cervical adenopathy.   Neurological: She is alert and oriented to person, place, and time.   Skin: Skin is warm and dry. Capillary refill takes less than 2 seconds. Rash noted.   Small round pale pink macular papular anterior chest.    Psychiatric: She has a normal mood and affect. Her behavior is normal.   Vitals reviewed.      LABS  No new labs      ASSESSMENT/PLAN  Gino was seen today for hypertension, anxiety, nausea and rash.    Diagnoses and all orders for this visit:    Essential hypertension  Comments:  controlled  Orders:  -     propranolol (INDERAL) 10 MG tablet; Take 1 tablet by mouth 2 (Two) Times a Day.  Pt would like to try holding hctz with sulfa allergy.  Encouraged low sodium diet.  Rash and nonspecific skin eruption  Hold hctz with sulfa allergy. Also discussed holding propranolol with prior allergy to betablocker. Pt would like to try holding the hctz first before changing both meds.  Anxiety  -     propranolol (INDERAL) 10 MG tablet; Take 1 tablet by mouth 2 (Two) Times a Day.    Nausea  -     promethazine (PHENERGAN) 25 MG tablet; Take 1 tablet by mouth Every 8 (Eight) Hours As Needed for Nausea or Vomiting.    Cough  -     albuterol sulfate  (90 Base) MCG/ACT inhaler; Inhale 2 puffs Every 4 (Four) Hours As Needed for Wheezing.      I discussed the patients findings and my recommendations with patient.  Patient was encouraged to keep me informed of any acute changes, lack of improvement, or any new concerning  symptoms.  Patient voiced understanding of all instructions and denied further questions.      FOLLOW-UP  Return in about 6 months (around 1/14/2021), or if symptoms worsen or fail to improve, for Annual 3-6 months with fasting labs.    Electronically signed by:    CLAY Bagley  07/14/2020    EMR Dragon/Transcription Disclaimer:  Much of this encounter note is an electronic transcription/translation of spoken language to printed text.  The electronic translation of spoken language may permit erroneous, or at times, nonsensical words or phrases to be inadvertently transcribed.  Although I have reviewed the note for such errors, some may still exist

## 2020-07-28 ENCOUNTER — OFFICE VISIT (OUTPATIENT)
Dept: ORTHOPEDIC SURGERY | Facility: CLINIC | Age: 50
End: 2020-07-28

## 2020-07-28 VITALS — HEIGHT: 63 IN | OXYGEN SATURATION: 97 % | HEART RATE: 87 BPM | WEIGHT: 184.97 LBS | BODY MASS INDEX: 32.77 KG/M2

## 2020-07-28 DIAGNOSIS — M62.838 MUSCLE SPASM OF LEFT LOWER EXTREMITY: ICD-10-CM

## 2020-07-28 DIAGNOSIS — M17.12 PRIMARY OSTEOARTHRITIS OF LEFT KNEE: Primary | ICD-10-CM

## 2020-07-28 PROCEDURE — 99213 OFFICE O/P EST LOW 20 MIN: CPT | Performed by: ORTHOPAEDIC SURGERY

## 2020-07-28 RX ORDER — MELOXICAM 15 MG/1
TABLET ORAL
Qty: 60 TABLET | Refills: 0 | Status: SHIPPED | OUTPATIENT
Start: 2020-07-28 | End: 2021-04-21 | Stop reason: SDUPTHER

## 2020-07-28 RX ORDER — METHOCARBAMOL 750 MG/1
750 TABLET, FILM COATED ORAL 4 TIMES DAILY PRN
Qty: 90 TABLET | Refills: 0 | Status: SHIPPED | OUTPATIENT
Start: 2020-07-28 | End: 2021-06-21

## 2020-07-28 NOTE — PROGRESS NOTES
Orthopaedic Clinic Note: Knee Established Patient    Chief Complaint   Patient presents with   • Follow-up     9 month follow up - Primary osteoarthritis of left knee        HPI    It has been 9  month(s) since Ms. Maguire's last visit. She returns to clinic today for complaint of left knee pain.  She states she had a new onset of sharp stabbing pain about the posterior medial left knee that began about 4 days ago.  No prior incident of trauma.  She describes it as a pulling sensation localized in the hamstring region posterior medially.  When it occurs, she is having difficulty straightening her leg out.  She will sit and relax for a few minutes and then the knee will gradually relax and she can straighten the knee out.  She rates her pain 2/10 on the pain scale.  She states that the episodes are random and will occur when she is not even moving or bending the knee.  She is here to discuss this new onset of symptoms as well as treatment options.    I have reviewed the following portions of the patient's history:History of Present Illness    Past Medical History:   Diagnosis Date   • Abdominal pain    • Abdominal pain, RUQ     Check cmp. refer to [facility] u/s and gen surg eval d/t cholelithiasis noted on CT of abd/pelvis donein ER 1/16   • Acute bronchitis     Take cefdinir and medrol dose pack as directed. Use otc mucinex. Continue to rest and push fluids. Call or rtc if no better. Gave phenergan w/ codeine cough syrup 5 ml po prn #120ml, no RF per Dr. Cesar   • Acute pharyngitis     Check for monod/t exposure/ Pt will restart flovent as used for esophagitis. If no improvement take the medrol dose pack she was given at least visit   • Acute sinusitis    • Acute upper respiratory infection    • Anxiety    • Body aches    • Cholelithiasis     Reviewed CT of adb/pelvis from ER 1/16 showed cholelithiasis. Will refer for gen surgery eval and [facility] u/s   • Chronic pain disorder    • Cough    • Eosinophilic esophagitis    •  Extremity pain    • Gestational diabetes    • Headache    • Influenza    • Labyrinthitis    • Low back pain    • Lumbar strain    • Lumbosacral disc disease    • Neck pain    • Right hip pain    • Shingles    • Strain of thoracic region    • Viral infection    • Viral pharyngitis       Past Surgical History:   Procedure Laterality Date   • BREAST BIOPSY Left 10/2018    Benign STEREO    •  SECTION      97, 00, 04, 12   • DILATATION AND CURETTAGE     • ENDOSCOPY     • OOPHORECTOMY Right 2017   • WISDOM TOOTH EXTRACTION        Family History   Problem Relation Age of Onset   • Arthritis Mother    • Hypertension Mother    • Thyroid disease Mother    • Arthritis Father    • Hypertension Father    • Heart disease Father    • Heart attack Other    • Arthritis Other    • Hypertension Other    • Migraines Other    • Stroke Other    • Breast cancer Other 31   • Ovarian cancer Cousin 42   • Heart failure Paternal Grandmother    • Endometrial cancer Neg Hx    • Colon cancer Neg Hx      Social History     Socioeconomic History   • Marital status:      Spouse name: Not on file   • Number of children: Not on file   • Years of education: Not on file   • Highest education level: Not on file   Occupational History   • Occupation:    Tobacco Use   • Smoking status: Former Smoker     Packs/day: 1.00     Years: 3.00     Pack years: 3.00     Types: Cigarettes     Last attempt to quit: 2009     Years since quitting: 10.9   • Smokeless tobacco: Never Used   Substance and Sexual Activity   • Alcohol use: Yes     Comment: social   • Drug use: No   • Sexual activity: Defer   Lifestyle   • Physical activity:     Days per week: 0 days     Minutes per session: Not on file   • Stress: Very much   Social History Narrative    Caffeine: 2 serving per day.      Current Outpatient Medications on File Prior to Visit   Medication Sig Dispense Refill   • albuterol sulfate  (90 Base) MCG/ACT  "inhaler Inhale 2 puffs Every 4 (Four) Hours As Needed for Wheezing. 1 inhaler 5   • ALPRAZolam (XANAX) 0.25 MG tablet Take 1 tablet by mouth At Night As Needed for Anxiety or Sleep. One po bid prn 30 tablet 0   • buPROPion XL (Wellbutrin XL) 150 MG 24 hr tablet Take 150 mg by mouth Daily.     • Ergocalciferol (VITAMIN D2 PO) Take 1 capsule by mouth.     • fluticasone (FLONASE) 50 MCG/ACT nasal spray 2 sprays into the nostril(s) as directed by provider.     • hydroCHLOROthiazide (HYDRODIURIL) 12.5 MG tablet Take 1 tablet by mouth Daily. 30 tablet 11   • loratadine (CLARITIN) 10 MG tablet Take 10 mg by mouth Daily.     • Multiple Vitamins-Minerals (MULTIVITAMIN ADULT PO) Take  by mouth.     • promethazine (PHENERGAN) 25 MG tablet Take 1 tablet by mouth Every 8 (Eight) Hours As Needed for Nausea or Vomiting. 21 tablet 2   • propranolol (INDERAL) 10 MG tablet Take 1 tablet by mouth 2 (Two) Times a Day. 60 tablet 11   • vitamin B-12 (CYANOCOBALAMIN) 1000 MCG tablet Take 1,000 mcg by mouth Daily.     • [DISCONTINUED] meloxicam (MOBIC) 15 MG tablet 1 PO Daily with food. 60 tablet 0     No current facility-administered medications on file prior to visit.       Allergies   Allergen Reactions   • Maxalt [Rizatriptan] Anaphylaxis   • Sulfa Antibiotics Rash   • Sumatriptan Anaphylaxis   • Zolmitriptan Anaphylaxis   • Labetalol Rash        Review of Systems   Constitutional: Negative.    HENT: Negative.    Eyes: Negative.    Respiratory: Negative.    Cardiovascular: Negative.    Gastrointestinal: Negative.    Endocrine: Negative.    Genitourinary: Negative.    Musculoskeletal: Positive for arthralgias and joint swelling.   Skin: Negative.    Allergic/Immunologic: Negative.    Neurological: Negative.    Hematological: Negative.    Psychiatric/Behavioral: Negative.         The patient's Review of Systems was personally reviewed and confirmed as accurate.    Physical Exam  Pulse 87, height 160 cm (62.99\"), weight 83.9 kg (184 lb " 15.5 oz), SpO2 97 %, not currently breastfeeding.    Body mass index is 32.77 kg/m².    GENERAL APPEARANCE: awake, alert, oriented, in no acute distress and well developed, well nourished  LUNGS:  breathing nonlabored  EXTREMITIES: no clubbing, cyanosis  PERIPHERAL PULSES: palpable dorsalis pedis and posterior tibial pulses bilaterally.    GAIT:  Normal        ----------  Left Knee Exam:  ----------  ALIGNMENT: neutral  ----------  RANGE OF MOTION:  Normal (0-120 degrees) with no extensor lag or flexion contracture  LIGAMENTOUS STABILITY:   stable to varus and valgus stress at terminal extension and 30 degrees without any evidence of laxity  ----------  STRENGTH:  KNEE FLEXION 5/5  KNEE EXTENSION  5/5  ANKLE DORSIFLEXION  5/5  ANKLE PLANTARFLEXION  5/5  ----------  PAIN WITH PALPATION:medial joint line and posterior medial hamstrings  KNEE EFFUSION: no  PAIN WITH KNEE ROM: yes  PATELLAR CREPITUS:  no  ----------  SENSATION TO LIGHT TOUCH:  DEEP PERONEAL/SUPERFICIAL PERONEAL/SURAL/SAPHENOUS/TIBIAL:    intact  ----------  EDEMA:  no  ERYTHEMA:    no  WOUNDS/INCISIONS:   no  _____________________________________________________________________  _____________________________________________________________________    RADIOGRAPHIC FINDINGS:   Indication: Left knee pain    Comparison: Todays xrays were compared to previous xrays from 8/16/2019    Knee films: mild tricompartmental osteoarthritis and No significant changes compared to prior radiographs.    Assessment/Plan:   Diagnosis Plan   1. Primary osteoarthritis of left knee  XR Knee 4+ View Left    meloxicam (MOBIC) 15 MG tablet   2. Muscle spasm of left lower extremity  methocarbamol (ROBAXIN) 750 MG tablet     Patient symptoms are due to muscle spasms of the hamstring musculature.  I recommended a muscle relaxer.  In addition to this I will prescribe her meloxicam for her knee arthritis.  She will follow-up in 2 weeks for repeat evaluation.  If her symptoms fail to  resolve, she may require MRI to evaluate for possible medial meniscal tear.      Shay Rock MD  07/28/20  09:43

## 2020-08-11 ENCOUNTER — OFFICE VISIT (OUTPATIENT)
Dept: ORTHOPEDIC SURGERY | Facility: CLINIC | Age: 50
End: 2020-08-11

## 2020-08-11 VITALS — HEART RATE: 89 BPM | OXYGEN SATURATION: 95 % | BODY MASS INDEX: 32.77 KG/M2 | HEIGHT: 63 IN | WEIGHT: 184.97 LBS

## 2020-08-11 DIAGNOSIS — M17.12 PRIMARY OSTEOARTHRITIS OF LEFT KNEE: ICD-10-CM

## 2020-08-11 DIAGNOSIS — M22.2X2 PATELLOFEMORAL PAIN SYNDROME OF LEFT KNEE: Primary | ICD-10-CM

## 2020-08-11 PROCEDURE — 99212 OFFICE O/P EST SF 10 MIN: CPT | Performed by: ORTHOPAEDIC SURGERY

## 2020-08-11 NOTE — PROGRESS NOTES
Orthopaedic Clinic Note: Knee Established Patient    Chief Complaint   Patient presents with   • Follow-up     2 week f/u--Primary osteoarthritis of left knee; Muscle spasm of left lower extremity         HPI    It has been 2  week(s) since Ms. Maguire's last visit. She returns to clinic today for follow-up left knee pain.  She continues to complain of pain isolated to the anterior aspect of her knee.  She was prescribed an anti-inflammatory as well as a muscle relaxer for muscle spasms as well as anterior based knee pain.  She states the knee pain is still persistent but the muscle spasms have significantly improved.  She is ambulating with no assistive device.  Rates her pain 2/10 on the pain scale.  Overall she is doing slightly better    Past Medical History:   Diagnosis Date   • Abdominal pain    • Abdominal pain, RUQ     Check cmp. refer to [facility] u/s and gen surg eval d/t cholelithiasis noted on CT of abd/pelvis donein ER 1/16   • Acute bronchitis     Take cefdinir and medrol dose pack as directed. Use otc mucinex. Continue to rest and push fluids. Call or rtc if no better. Gave phenergan w/ codeine cough syrup 5 ml po prn #120ml, no RF per Dr. Cesar   • Acute pharyngitis     Check for monod/t exposure/ Pt will restart flovent as used for esophagitis. If no improvement take the medrol dose pack she was given at least visit   • Acute sinusitis    • Acute upper respiratory infection    • Anxiety    • Body aches    • Cholelithiasis     Reviewed CT of adb/pelvis from ER 1/16 showed cholelithiasis. Will refer for gen surgery eval and GB u/s   • Chronic pain disorder    • Cough    • Eosinophilic esophagitis    • Extremity pain    • Gestational diabetes    • Headache    • Influenza    • Labyrinthitis    • Low back pain    • Lumbar strain    • Lumbosacral disc disease    • Neck pain    • Right hip pain    • Shingles    • Strain of thoracic region    • Viral infection    • Viral pharyngitis       Past Surgical History:    Procedure Laterality Date   • BREAST BIOPSY Left 10/2018    Benign STEREO    •  SECTION      97, 00, 04, 12   • DILATATION AND CURETTAGE     • ENDOSCOPY     • OOPHORECTOMY Right 2017   • WISDOM TOOTH EXTRACTION        Family History   Problem Relation Age of Onset   • Arthritis Mother    • Hypertension Mother    • Thyroid disease Mother    • Arthritis Father    • Hypertension Father    • Heart disease Father    • Heart attack Other    • Arthritis Other    • Hypertension Other    • Migraines Other    • Stroke Other    • Breast cancer Other 31   • Ovarian cancer Cousin 42   • Heart failure Paternal Grandmother    • Endometrial cancer Neg Hx    • Colon cancer Neg Hx      Social History     Socioeconomic History   • Marital status:      Spouse name: Not on file   • Number of children: Not on file   • Years of education: Not on file   • Highest education level: Not on file   Occupational History   • Occupation:    Tobacco Use   • Smoking status: Former Smoker     Packs/day: 1.00     Years: 3.00     Pack years: 3.00     Types: Cigarettes     Last attempt to quit: 2009     Years since quittin.0   • Smokeless tobacco: Never Used   Substance and Sexual Activity   • Alcohol use: Yes     Comment: social   • Drug use: No   • Sexual activity: Defer   Lifestyle   • Physical activity:     Days per week: 0 days     Minutes per session: Not on file   • Stress: Very much   Social History Narrative    Caffeine: 2 serving per day.      Current Outpatient Medications on File Prior to Visit   Medication Sig Dispense Refill   • albuterol sulfate  (90 Base) MCG/ACT inhaler Inhale 2 puffs Every 4 (Four) Hours As Needed for Wheezing. 1 inhaler 5   • ALPRAZolam (XANAX) 0.25 MG tablet Take 1 tablet by mouth At Night As Needed for Anxiety or Sleep. One po bid prn 30 tablet 0   • buPROPion XL (Wellbutrin XL) 150 MG 24 hr tablet Take 150 mg by mouth Daily.     • Ergocalciferol  "(VITAMIN D2 PO) Take 1 capsule by mouth.     • fluticasone (FLONASE) 50 MCG/ACT nasal spray 2 sprays into the nostril(s) as directed by provider.     • hydroCHLOROthiazide (HYDRODIURIL) 12.5 MG tablet Take 1 tablet by mouth Daily. 30 tablet 11   • loratadine (CLARITIN) 10 MG tablet Take 10 mg by mouth Daily.     • meloxicam (MOBIC) 15 MG tablet 1 PO Daily with food. 60 tablet 0   • methocarbamol (ROBAXIN) 750 MG tablet Take 1 tablet by mouth 4 (Four) Times a Day As Needed for Muscle Spasms. 90 tablet 0   • Multiple Vitamins-Minerals (MULTIVITAMIN ADULT PO) Take  by mouth.     • promethazine (PHENERGAN) 25 MG tablet Take 1 tablet by mouth Every 8 (Eight) Hours As Needed for Nausea or Vomiting. 21 tablet 2   • propranolol (INDERAL) 10 MG tablet Take 1 tablet by mouth 2 (Two) Times a Day. 60 tablet 11   • vitamin B-12 (CYANOCOBALAMIN) 1000 MCG tablet Take 1,000 mcg by mouth Daily.       No current facility-administered medications on file prior to visit.       Allergies   Allergen Reactions   • Maxalt [Rizatriptan] Anaphylaxis   • Sulfa Antibiotics Rash   • Sumatriptan Anaphylaxis   • Zolmitriptan Anaphylaxis   • Labetalol Rash        Review of Systems   Constitutional: Positive for activity change.   HENT: Negative.    Eyes: Negative.    Respiratory: Negative.    Cardiovascular: Negative.    Gastrointestinal: Negative.    Endocrine: Negative.    Genitourinary: Negative.    Musculoskeletal: Positive for arthralgias, back pain and myalgias.   Skin: Negative.    Allergic/Immunologic: Negative.    Neurological: Negative.    Hematological: Negative.    Psychiatric/Behavioral: Negative.         The patient's Review of Systems was personally reviewed and confirmed as accurate.    Physical Exam  Pulse 89, height 160 cm (62.99\"), weight 83.9 kg (184 lb 15.5 oz), SpO2 95 %, not currently breastfeeding.    Body mass index is 32.77 kg/m².    GENERAL APPEARANCE: awake, alert, oriented, in no acute distress and well developed, well " nourished  LUNGS:  breathing nonlabored  EXTREMITIES: no clubbing, cyanosis  PERIPHERAL PULSES: palpable dorsalis pedis and posterior tibial pulses bilaterally.    GAIT:  Normal        ----------  Left Knee Exam:  ----------  ALIGNMENT: neutral  ----------  RANGE OF MOTION:  Normal (0-120 degrees) with no extensor lag or flexion contracture  LIGAMENTOUS STABILITY:   stable to varus and valgus stress at terminal extension and 30 degrees without any evidence of laxity  ----------  STRENGTH:  KNEE FLEXION 5/5  KNEE EXTENSION  5/5  ANKLE DORSIFLEXION  5/5  ANKLE PLANTARFLEXION  5/5  ----------  PAIN WITH PALPATION:anterior knee  KNEE EFFUSION: no  PAIN WITH KNEE ROM: yes  PATELLAR CREPITUS:  yes, painful and symptomatic  ----------  SENSATION TO LIGHT TOUCH:  DEEP PERONEAL/SUPERFICIAL PERONEAL/SURAL/SAPHENOUS/TIBIAL:    intact  ----------  EDEMA:  no  ERYTHEMA:    no  WOUNDS/INCISIONS:   no  _____________________________________________________________________  _____________________________________________________________________    RADIOGRAPHIC FINDINGS:   No new imaging today     assessment/Plan:   Diagnosis Plan   1. Patellofemoral pain syndrome of left knee  Ambulatory Referral to Physical Therapy Evaluate and treat, Ortho   2. Primary osteoarthritis of left knee       Patient symptoms are persisting.  I discussed treatment options.  She is agreeable to physical therapy referral.  She will follow-up in 2 months.      Shay Rock MD  08/11/20  10:08

## 2020-08-27 ENCOUNTER — TELEPHONE (OUTPATIENT)
Dept: ORTHOPEDICS | Facility: OTHER | Age: 50
End: 2020-08-27

## 2020-09-03 ENCOUNTER — TELEPHONE (OUTPATIENT)
Dept: ORTHOPEDIC SURGERY | Facility: CLINIC | Age: 50
End: 2020-09-03

## 2020-09-03 DIAGNOSIS — M25.562 ACUTE PAIN OF LEFT KNEE: Primary | ICD-10-CM

## 2020-09-03 NOTE — TELEPHONE ENCOUNTER
MRI ordered.  It may get declined by insurance in which case I would need to see her for further evaluation.

## 2020-09-03 NOTE — TELEPHONE ENCOUNTER
PATIENT CALLED STATED LEFT KNEE IS IN A LOT OF PAIN AND FEELS AS IF SHE NEEDS TO HAVE AN MRI. PATIENT STATED SHE GETS WOKE UP AT NIGHT IN PAIN. PATIENT WOULD LIKE A CALL BACK [phone]

## 2020-09-21 ENCOUNTER — TREATMENT (OUTPATIENT)
Dept: PHYSICAL THERAPY | Facility: CLINIC | Age: 50
End: 2020-09-21

## 2020-09-21 DIAGNOSIS — M25.562 LEFT KNEE PAIN, UNSPECIFIED CHRONICITY: Primary | ICD-10-CM

## 2020-09-21 PROCEDURE — 97162 PT EVAL MOD COMPLEX 30 MIN: CPT | Performed by: PHYSICAL THERAPIST

## 2020-09-21 PROCEDURE — 97110 THERAPEUTIC EXERCISES: CPT | Performed by: PHYSICAL THERAPIST

## 2020-09-21 NOTE — PROGRESS NOTES
Physical Therapy Initial Evaluation and Plan of Care      Subjective Evaluation    History of Present Illness  Mechanism of injury: Pt is a 50 year old knee pain presenting to the clinic with left knee pain. The pain started getting bad in the Summer of . In mid 2020, she went to take a step backwards and could not straighten her leg and had sharp pain. She notes that recently she got a higher bed and thinks that has strained her knee. She gets locking in the knee at times. Meloxicam helps. In the last month she has had 2 instances of painful popping under her knee cap. She reports she is afraid to sleep on her side and cross her legs because that causes her to feel locked up. She has been wearing a soft brace for a few weeks. She has an MRI scheduled for tomorrow. Pt reports she has numbness in the bottom of her left leg and in the bottom of her foot. She reports she had numbness in her foot before, but it has gotten worse. She has a disc issue in her back. She reports that in  she had pre eclampsia, and thinks that she may have had a mini stroke. She has not been diagnosed with this, but ever since she has had issues on the left side of her body.       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 Quality of life: good    Pain  Current pain ratin  At worst pain ratin  Quality: sharp, dull ache and burning  Relieving factors: medications, ice and support  Aggravating factors: movement, stairs, standing, ambulation, squatting and sleeping  Progression: worsening    Social Support  Lives in: multiple-level home    Diagnostic Tests  X-ray: abnormal (Mild OA)  MRI studies: abnormal (Chondromalacia and edema)    Patient Goals  Patient goals for therapy: decreased edema, decreased pain, increased motion, improved balance, increased strength, independence with ADLs/IADLs and return to sport/leisure activities             Objective          Observations     Additional Knee Observation Details  Pt has  noticeable loss of quadriceps muscle on the left.    Tenderness   Left Knee   Tenderness in the inferior patella, medial joint line, patellar tendon, quadriceps tendon and superior patella.     Active Range of Motion   Left Knee   Flexion: 130 degrees with pain  Extension: 1 degrees with pain    Right Knee   Flexion: 138 degrees   Extension: 6 (hyperextension) degrees     Patellar Mobility   Left Knee Hypermobile in the left medial, left lateral, left superior and left inferior patellar tendon(s).     Right Knee Hypermobile in the medial, lateral, superior and inferior patellar tendon(s).     Patellar Mobility Comments   Left medial tendon comments: pain. Left inferior tendon comments: pain.     Strength/Myotome Testing     Left Hip   Planes of Motion   Flexion: 4+    Right Hip   Planes of Motion   Flexion: 4+    Left Knee   Left knee flexion strength: Not tested due to apprehension.  Extension: 3+    Right Knee   Flexion: 5  Extension: 4+    Tests     Left Knee   Positive patellar compression and valgus stress test at 30 degrees.   Negative varus stress test at 30 degrees.     Swelling     Left Knee Girth Measurement (cm)   Joint line: 39 cm    Right Knee Girth Measurement (cm)   Joint line: 38 cm    Ambulation     Comments   Pt ambulates with antalgic gait and decreased knee extension on the left.          Assessment & Plan     Assessment  Impairments: abnormal coordination, abnormal gait, abnormal muscle firing, abnormal muscle tone, abnormal or restricted ROM, activity intolerance, impaired physical strength, lacks appropriate home exercise program, pain with function and weight-bearing intolerance  Assessment details: Pt is a 50 year old female presenting to the clinic with chronic left knee pain. It was difficult to perform a full examination due to anxiety and irritability of symptoms. She has decreased ROM, swelling, decreased strength, abnormal gait, and tenderness around her patella and at the medial joint  line. Her impairments are limiting her ability to walk, stand, move in bed, and squat. Pt would benefit from skilled PT services in order to address these impairments so that she can reach her long term goals.  Prognosis: good  Functional Limitations: lifting, sleeping, walking, uncomfortable because of pain, moving in bed and standing  Goals  Plan Goals: SHORT TERM GOALS:  2 weeks       1. Pt independent with HEP  2. Pt to demonstrate argelia hip strength 4/5 or greater to improve stability with ambulation  3. Pt to report being able to walk for 10 minutes without increasing pain in the left knee    LONG TERM GOALS:   6 weeks  1. Pt to demonstrate ability to perform full functional squat with good form and control of the knees and without increasing pain  2. Pt to demonstrate argelia hip strength to 4+/5 or greater to improve safety with ambulation on uneven surfaces  3. Pt to return to work full duty without increased pain in the left knee   4. Pt to demonstrate ability to perform step up/down 8 inch step x10 safely and without pain in the left knee     Plan  Therapy options: will be seen for skilled physical therapy services  Planned modality interventions: cryotherapy, electrical stimulation/Russian stimulation, high voltage pulsed current (pain management) and TENS  Planned therapy interventions: balance/weight-bearing training, flexibility, functional ROM exercises, gait training, home exercise program, joint mobilization, manual therapy, motor coordination training, neuromuscular re-education, soft tissue mobilization, strengthening, stretching and therapeutic activities  Duration in visits: 2  Duration in weeks: 4  Treatment plan discussed with: patient        Manual Therapy:         mins  34595;  Therapeutic Exercise:    12     mins  43402;     Neuromuscular Zee:        mins  25814;    Therapeutic Activity:          mins  85917;     Gait Training:           mins  82317;     Ultrasound:          mins  51295;     Electrical Stimulation:         mins  30711 ( );  Dry Needling          mins self-pay    Timed Treatment:   12   mins   Total Treatment:     60   mins    PT SIGNATURE: Chey Akins, PT   DATE TREATMENT INITIATED: 9/21/2020    Initial Certification  Certification Period: 12/20/2020  I certify that the therapy services are furnished while this patient is under my care.  The services outlined above are required by this patient, and will be reviewed every 90 days.     PHYSICIAN: Shay Rock MD      DATE:     Please sign and return via fax to [phone].. Thank you, [facility].

## 2020-09-22 ENCOUNTER — HOSPITAL ENCOUNTER (OUTPATIENT)
Dept: MRI IMAGING | Facility: HOSPITAL | Age: 50
Discharge: HOME | End: 2020-09-22
Admitting: ORTHOPAEDIC SURGERY

## 2020-09-22 DIAGNOSIS — M25.562 ACUTE PAIN OF LEFT KNEE: ICD-10-CM

## 2020-09-22 PROCEDURE — 73721 MRI JNT OF LWR EXTRE W/O DYE: CPT

## 2020-09-24 ENCOUNTER — TREATMENT (OUTPATIENT)
Dept: PHYSICAL THERAPY | Facility: CLINIC | Age: 50
End: 2020-09-24

## 2020-09-24 DIAGNOSIS — M25.562 LEFT KNEE PAIN, UNSPECIFIED CHRONICITY: Primary | ICD-10-CM

## 2020-09-24 PROCEDURE — 97110 THERAPEUTIC EXERCISES: CPT | Performed by: PHYSICAL THERAPIST

## 2020-09-24 PROCEDURE — 97140 MANUAL THERAPY 1/> REGIONS: CPT | Performed by: PHYSICAL THERAPIST

## 2020-09-24 PROCEDURE — 97112 NEUROMUSCULAR REEDUCATION: CPT | Performed by: PHYSICAL THERAPIST

## 2020-09-24 NOTE — PROGRESS NOTES
Physical Therapy Daily Progress Note    Subjective   Gino Maguire reports: she is feeling pretty good today. Her back has really bothered her this week.      Objective   See Exercise, Manual, and Modality Logs for complete treatment.       Assessment/Plan   Pt tolerated treatment well. She had increased pain with LAQ. She was instructed to add straight leg raises into her HEP. Pt would benefit from continued skilled PT.    Progress per Plan of Care           Manual Therapy:    12     mins  28001;  Therapeutic Exercise:    25     mins  99285;     Neuromuscular Zee:    15    mins  42459;    Therapeutic Activity:          mins  68073;     Gait Training:           mins  08259;     Ultrasound:          mins  46903;    Electrical Stimulation:         mins  04196 ( );  E-Stim Attended:         mins  50241  Iontophoresis          mins 44832   Traction          mins  36135  Fluidotherapy          mins  20123  Dry Needling          mins self-pay - No Charge  Paraffin          mins  80717    Timed Treatment:   53   mins   Total Treatment:     63   mins    Chey Akins, PT, DPT  Physical Therapist

## 2020-09-25 ENCOUNTER — TELEPHONE (OUTPATIENT)
Dept: ORTHOPEDIC SURGERY | Facility: CLINIC | Age: 50
End: 2020-09-25

## 2020-09-25 NOTE — TELEPHONE ENCOUNTER
I would like to discuss MRI results via Tele visit. Please schedule for Tuesday if possible. I will go over the MRI results and treatment options.

## 2020-09-25 NOTE — TELEPHONE ENCOUNTER
Dr. Rock,    Patient called wanting MRI results over the phone. I called and explained that I could not release any results without your permission. Do you want to speak with her maybe a televisit? Or would you like me to report the results to her? I told her it may not be until Monday when I hear back and she was ok with that. Please advise thank you!  [place]

## 2020-09-29 ENCOUNTER — OFFICE VISIT (OUTPATIENT)
Dept: ORTHOPEDIC SURGERY | Facility: CLINIC | Age: 50
End: 2020-09-29

## 2020-09-29 DIAGNOSIS — M17.12 PRIMARY OSTEOARTHRITIS OF LEFT KNEE: Primary | ICD-10-CM

## 2020-09-29 PROCEDURE — 99442 PR PHYS/QHP TELEPHONE EVALUATION 11-20 MIN: CPT | Performed by: ORTHOPAEDIC SURGERY

## 2020-09-29 NOTE — PROGRESS NOTES
You have chosen to receive care through a telephone visit. Do you consent to use a telephone visit for your medical care today? yes    Orthopaedic Clinic Note: Knee Established Patient    Chief Complaint   Patient presents with   • Follow-up     6 week MRI f/u--Patellofemoral pain syndrome of left knee         HPI    It has been 6  week(s) since Ms. Maguire's last visit. She returns for telephone encounter today to discuss her continued ongoing left knee pain.  She is here for MRI follow-up on her left knee.  She continues to complain of pain localized anterior aspect of the knee.  She rates the pain a 3/10 on the pain scale.  She states that with certain movements such as bending and twisting she has sharp pain and popping over the anterior knee followed by subsequent swelling.  She denies any ayleen mechanical symptoms or medial joint line pain.  Overall she is doing about the same.      Past Medical History:   Diagnosis Date   • Abdominal pain    • Abdominal pain, RUQ     Check cmp. refer to [facility] u/s and gen surg eval d/t cholelithiasis noted on CT of abd/pelvis donein ER 1/16   • Acute bronchitis     Take cefdinir and medrol dose pack as directed. Use otc mucinex. Continue to rest and push fluids. Call or rtc if no better. Gave phenergan w/ codeine cough syrup 5 ml po prn #120ml, no RF per Dr. Cesar   • Acute pharyngitis     Check for monod/t exposure/ Pt will restart flovent as used for esophagitis. If no improvement take the medrol dose pack she was given at least visit   • Acute sinusitis    • Acute upper respiratory infection    • Anxiety    • Body aches    • Cholelithiasis     Reviewed CT of adb/pelvis from ER 1/16 showed cholelithiasis. Will refer for gen surgery eval and GB u/s   • Chronic pain disorder    • Cough    • Eosinophilic esophagitis    • Extremity pain    • Gestational diabetes    • Headache    • Influenza    • Labyrinthitis    • Low back pain    • Lumbar strain    • Lumbosacral disc disease     • Neck pain    • Right hip pain    • Shingles    • Strain of thoracic region    • Viral infection    • Viral pharyngitis       Past Surgical History:   Procedure Laterality Date   • BREAST BIOPSY Left 10/2018    Benign STEREO    •  SECTION      97, 00, 04, 12   • DILATATION AND CURETTAGE     • ENDOSCOPY     • OOPHORECTOMY Right 2017   • WISDOM TOOTH EXTRACTION        Family History   Problem Relation Age of Onset   • Arthritis Mother    • Hypertension Mother    • Thyroid disease Mother    • Arthritis Father    • Hypertension Father    • Heart disease Father    • Heart attack Other    • Arthritis Other    • Hypertension Other    • Migraines Other    • Stroke Other    • Breast cancer Other 31   • Ovarian cancer Cousin 42   • Heart failure Paternal Grandmother    • Endometrial cancer Neg Hx    • Colon cancer Neg Hx      Social History     Socioeconomic History   • Marital status:      Spouse name: Not on file   • Number of children: Not on file   • Years of education: Not on file   • Highest education level: Not on file   Occupational History   • Occupation:    Tobacco Use   • Smoking status: Former Smoker     Packs/day: 1.00     Years: 3.00     Pack years: 3.00     Types: Cigarettes     Quit date: 2009     Years since quittin.1   • Smokeless tobacco: Never Used   Substance and Sexual Activity   • Alcohol use: Yes     Comment: social   • Drug use: No   • Sexual activity: Defer   Lifestyle   • Physical activity     Days per week: 0 days     Minutes per session: Not on file   • Stress: Very much   Social History Narrative    Caffeine: 2 serving per day.      Current Outpatient Medications on File Prior to Visit   Medication Sig Dispense Refill   • albuterol sulfate  (90 Base) MCG/ACT inhaler Inhale 2 puffs Every 4 (Four) Hours As Needed for Wheezing. 1 inhaler 5   • ALPRAZolam (XANAX) 0.25 MG tablet Take 1 tablet by mouth At Night As Needed for Anxiety  or Sleep. One po bid prn 30 tablet 0   • buPROPion XL (Wellbutrin XL) 150 MG 24 hr tablet Take 150 mg by mouth Daily.     • Ergocalciferol (VITAMIN D2 PO) Take 1 capsule by mouth.     • fluticasone (FLONASE) 50 MCG/ACT nasal spray 2 sprays into the nostril(s) as directed by provider.     • hydroCHLOROthiazide (HYDRODIURIL) 12.5 MG tablet Take 1 tablet by mouth Daily. 30 tablet 11   • loratadine (CLARITIN) 10 MG tablet Take 10 mg by mouth Daily.     • meloxicam (MOBIC) 15 MG tablet 1 PO Daily with food. 60 tablet 0   • methocarbamol (ROBAXIN) 750 MG tablet Take 1 tablet by mouth 4 (Four) Times a Day As Needed for Muscle Spasms. 90 tablet 0   • Multiple Vitamins-Minerals (MULTIVITAMIN ADULT PO) Take  by mouth.     • promethazine (PHENERGAN) 25 MG tablet Take 1 tablet by mouth Every 8 (Eight) Hours As Needed for Nausea or Vomiting. 21 tablet 2   • propranolol (INDERAL) 10 MG tablet Take 1 tablet by mouth 2 (Two) Times a Day. 60 tablet 11   • vitamin B-12 (CYANOCOBALAMIN) 1000 MCG tablet Take 1,000 mcg by mouth Daily.       No current facility-administered medications on file prior to visit.       Allergies   Allergen Reactions   • Maxalt [Rizatriptan] Anaphylaxis   • Sulfa Antibiotics Rash   • Sumatriptan Anaphylaxis   • Zolmitriptan Anaphylaxis   • Labetalol Rash        Review of Systems   Constitutional: Negative.    HENT: Negative.    Eyes: Negative.    Respiratory: Negative.    Cardiovascular: Negative.    Gastrointestinal: Negative.    Endocrine: Negative.    Genitourinary: Negative.    Musculoskeletal: Positive for arthralgias and joint swelling.   Skin: Negative.    Allergic/Immunologic: Negative.    Neurological: Negative.    Hematological: Negative.    Psychiatric/Behavioral: Negative.         The patient's Review of Systems was personally reviewed and confirmed as accurate.    Physical Exam  No physical exam secondary to telephone  encounter  _____________________________________________________________________  _____________________________________________________________________    RADIOGRAPHIC FINDINGS:   MRI of the left knee from 9/22/2020 was personally reviewed.  MRI shows evidence of moderate to severe chondromalacia involving patellofemoral compartment with moderate tricompartmental osteoarthritis.  Longitudinal tear of the posterior medial meniscus is not clearly visible but there is definite intrasubstance degenerative changes as well as some medial meniscal extrusion.    Assessment/Plan:   Diagnosis Plan   1. Primary osteoarthritis of left knee  Large Joint Arthrocentesis     I discussed the results of the MRI with the patient.  The majority of her MRI is consistent with arthritis.  This corresponds with her symptoms.  Overall she has clinically moderate arthritic picture with significant joint space narrowing and chondromalacia in the medial patellofemoral compartments.  I discussed treatment options.  She wishes to pursue Visco supplementation injection in the left knee.  We obtain insurance preauthorization initiate the injections after insurance approval.    This visit has been rescheduled as a phone visit to comply with patient safety concerns in accordance with CDC recommendations. Total time of discussion was 11 minutes 13 seconds minutes.        Shay Rock MD  09/29/20  08:18 EDT

## 2020-09-30 ENCOUNTER — TREATMENT (OUTPATIENT)
Dept: PHYSICAL THERAPY | Facility: CLINIC | Age: 50
End: 2020-09-30

## 2020-09-30 DIAGNOSIS — M54.16 RADICULOPATHY, LUMBAR REGION: Primary | ICD-10-CM

## 2020-09-30 PROCEDURE — 97112 NEUROMUSCULAR REEDUCATION: CPT | Performed by: PHYSICAL THERAPIST

## 2020-09-30 PROCEDURE — 97140 MANUAL THERAPY 1/> REGIONS: CPT | Performed by: PHYSICAL THERAPIST

## 2020-09-30 PROCEDURE — 97110 THERAPEUTIC EXERCISES: CPT | Performed by: PHYSICAL THERAPIST

## 2020-09-30 PROCEDURE — 97162 PT EVAL MOD COMPLEX 30 MIN: CPT | Performed by: PHYSICAL THERAPIST

## 2020-09-30 NOTE — PROGRESS NOTES
"   Physical Therapy Initial Evaluation    Subjective Evaluation    History of Present Illness  Mechanism of injury: Pt reports she has been having a lot of low back pain lately. She has had pain since her 20s. Pt reports that in the last 4 years she has found out that she has a disc issue. She thinks it is around L3/4. She has had injections in the past. Lately her pain has been worsening. She reports she has not been doing anything to strengthen her core and is walking \"funny\" due to knee pain. She reports she gets numbness and tingling in her left leg. She states that laying flat on her back, and long sitting are very bothersome. The pain and numbness will shoot to mid hamstring area. She has a history of right hip pain as well and that will flare up every one in awhile.     Pain  Current pain ratin  At worst pain ratin  Quality: dull ache and sharp  Aggravating factors: sleeping, outstretched reach and ambulation          Objective          Palpation   Left   Tenderness of the piriformis.   Trigger point to piriformis.     Neurological Testing     Sensation     Lumbar   Left   Diminished: light touch    Right   Intact: light touch    Comments   Left light touch: L2, L5, S1 distribution    Reflexes   Left   Patellar (L4): normal (2+)  Achilles (S1): normal (2+)    Right   Patellar (L4): normal (2+)  Achilles (S1): normal (2+)    Active Range of Motion     Lumbar   Flexion: 70 degrees   Extension: 22 degrees with pain  Left lateral flexion: 10 degrees with pain  Right lateral flexion: 13 degrees     Tests       Thoracic   Positive slump.     Lumbar     Left   Positive passive SLR.     Right   Positive passive SLR.          Assessment & Plan     Assessment  Impairments: abnormal coordination, abnormal muscle tone, abnormal or restricted ROM, activity intolerance, impaired physical strength, lacks appropriate home exercise program and pain with function  Assessment details: Pt is a 50 year old female who is " coming to PT for left knee pain. Her low back has also really been bothering her. She has signs and symptoms consistent with lumbar radiculopathy. She has decreased ROM and pain with lumbar extension, decreased sensation on the left, and positive SLR. Her impairments are causing her to have pain while laying flat and walking for long periods. Pt would benefit from skilled PT services in order to address these impairments so that she can reach her long term goals.  Prognosis: fair  Functional Limitations: sleeping, walking, uncomfortable because of pain and moving in bed  Goals  Plan Goals: SHORT TERM GOALS:     2 weeks  1. Pt independent with HEP  2. Pt to demonstrate bilateral hip strength 4/5 in all planes to improved stability of the core/trunk     LONG TERM GOALS:   6 weeks  1. Pt to demonstrate trunk AROM % of expected norms to allow for improved ability to perform functional activities  2. Pt to demonstrate ability to perform full functional squat with good form and without increased pain in the low back   3. Pt to report being able to work full shift or work in the home without increase in pain in the back  4. Pt to report cessation of pain/numbness/tingling into the left leg to show decreased nerve compression    Plan  Therapy options: will be seen for skilled physical therapy services  Planned modality interventions: dry needling, cryotherapy, electrical stimulation/Russian stimulation, TENS, microcurrent electrical stimulation and high voltage pulsed current (pain management)  Planned therapy interventions: abdominal trunk stabilization, flexibility, functional ROM exercises, home exercise program, joint mobilization, manual therapy, neuromuscular re-education, postural training, soft tissue mobilization, spinal/joint mobilization, strengthening, stretching and therapeutic activities  Duration in visits: 2  Duration in weeks: 4  Treatment plan discussed with: patient           Manual Therapy:    12      mins  96924;  Therapeutic Exercise:    10     mins  51496;     Neuromuscular Zee:    10    mins  03759;    Therapeutic Activity:          mins  64538;     Gait Training:           mins  61601;     Ultrasound:          mins  77680;    Electrical Stimulation:         mins  41813 ( );  E-Stim Attended:         mins  31259  Iontophoresis          mins 65366   Traction          mins  99853  Fluidotherapy          mins  73339  Dry Needling          mins self-pay - No Charge  Paraffin          mins  16662    Timed Treatment:   32   mins   Total Treatment:     60   mins    Chey Akins PT, DPT  Physical Therapist

## 2020-10-02 ENCOUNTER — TELEPHONE (OUTPATIENT)
Dept: PHYSICAL THERAPY | Facility: CLINIC | Age: 50
End: 2020-10-02

## 2020-10-05 ENCOUNTER — TREATMENT (OUTPATIENT)
Dept: PHYSICAL THERAPY | Facility: CLINIC | Age: 50
End: 2020-10-05

## 2020-10-05 DIAGNOSIS — M25.562 LEFT KNEE PAIN, UNSPECIFIED CHRONICITY: Primary | ICD-10-CM

## 2020-10-05 PROCEDURE — 97110 THERAPEUTIC EXERCISES: CPT | Performed by: PHYSICAL THERAPIST

## 2020-10-05 PROCEDURE — 97530 THERAPEUTIC ACTIVITIES: CPT | Performed by: PHYSICAL THERAPIST

## 2020-10-05 PROCEDURE — 97140 MANUAL THERAPY 1/> REGIONS: CPT | Performed by: PHYSICAL THERAPIST

## 2020-10-05 NOTE — PROGRESS NOTES
Physical Therapy Daily Progress Note    Subjective   Gino Maguire reports: her back is feeling really good. She has not gotten scheduled for her gel shot yet.    Objective   See Exercise, Manual, and Modality Logs for complete treatment.       Assessment/Plan   Pt tolerated treatment well. She was unable to complete squats deeper than around 30 degrees. Pt would benefit from continued skilled PT.    Progress per Plan of Care           Manual Therapy:    14     mins  91848;  Therapeutic Exercise:    15     mins  72966;     Neuromuscular Zee:        mins  72928;    Therapeutic Activity:     25     mins  45883;     Gait Training:           mins  68962;     Ultrasound:          mins  33424;    Electrical Stimulation:         mins  96177 ( );  E-Stim Attended:         mins  68438  Iontophoresis          mins 17826   Traction          mins  56250  Fluidotherapy          mins  84599  Dry Needling          mins self-pay - No Charge  Paraffin          mins  45496    Timed Treatment:  54    mins   Total Treatment:     54   mins    Chey Akins, PT, DPT  Physical Therapist

## 2020-10-23 ENCOUNTER — TELEMEDICINE (OUTPATIENT)
Dept: FAMILY MEDICINE CLINIC | Facility: TELEHEALTH | Age: 50
End: 2020-10-23

## 2020-10-23 DIAGNOSIS — Z20.822 CLOSE EXPOSURE TO COVID-19 VIRUS: Primary | ICD-10-CM

## 2020-10-23 DIAGNOSIS — J22 LOWER RESPIRATORY INFECTION (E.G., BRONCHITIS, PNEUMONIA, PNEUMONITIS, PULMONITIS): ICD-10-CM

## 2020-10-23 PROCEDURE — 99213 OFFICE O/P EST LOW 20 MIN: CPT | Performed by: NURSE PRACTITIONER

## 2020-10-23 RX ORDER — DOXYCYCLINE HYCLATE 100 MG/1
100 CAPSULE ORAL 2 TIMES DAILY
Qty: 20 CAPSULE | Refills: 0 | Status: SHIPPED | OUTPATIENT
Start: 2020-10-23 | End: 2020-11-02

## 2020-10-23 RX ORDER — BENZONATATE 200 MG/1
200 CAPSULE ORAL 3 TIMES DAILY PRN
Qty: 21 CAPSULE | Refills: 0 | Status: SHIPPED | OUTPATIENT
Start: 2020-10-23 | End: 2020-12-28

## 2020-10-23 NOTE — PROGRESS NOTES
CHIEF COMPLAINT  Chief Complaint   Patient presents with   • Cough   • Headache   • Nasal Congestion         HPI  Gino Maguire is a 50 y.o. female  presents with complaint of 1-2 week history of  runny nose, sore throat, chest congestion with tightness, nasal drainage is yellow with blood, cough that is occasional.   tested positive for COVID-19 today.     Review of Systems   Constitutional: Positive for activity change and fatigue. Negative for appetite change and fever.   HENT: Positive for congestion, postnasal drip and sore throat. Negative for ear pain, sinus pressure and sinus pain.    Respiratory: Positive for cough and chest tightness. Negative for shortness of breath and wheezing.    Neurological: Negative for dizziness and headaches.   All other systems reviewed and are negative.      Past Medical History:   Diagnosis Date   • Abdominal pain    • Abdominal pain, RUQ     Check cmp. refer to [facility] u/s and gen surg eval d/t cholelithiasis noted on CT of abd/pelvis donein ER 1/16   • Acute bronchitis     Take cefdinir and medrol dose pack as directed. Use otc mucinex. Continue to rest and push fluids. Call or rtc if no better. Gave phenergan w/ codeine cough syrup 5 ml po prn #120ml, no RF per Dr. Cesar   • Acute pharyngitis     Check for monod/t exposure/ Pt will restart flovent as used for esophagitis. If no improvement take the medrol dose pack she was given at least visit   • Acute sinusitis    • Acute upper respiratory infection    • Anxiety    • Body aches    • Cholelithiasis     Reviewed CT of adb/pelvis from ER 1/16 showed cholelithiasis. Will refer for gen surgery eval and [facility] u/[facility]   • Chronic pain disorder    • Cough    • Eosinophilic esophagitis    • Extremity pain    • Gestational diabetes    • Headache    • Influenza    • Labyrinthitis    • Low back pain    • Lumbar strain    • Lumbosacral disc disease    • Neck pain    • Right hip pain    • Shingles    • Strain of thoracic region    •  Viral infection    • Viral pharyngitis        Family History   Problem Relation Age of Onset   • Arthritis Mother    • Hypertension Mother    • Thyroid disease Mother    • Arthritis Father    • Hypertension Father    • Heart disease Father    • Heart attack Other    • Arthritis Other    • Hypertension Other    • Migraines Other    • Stroke Other    • Breast cancer Other 31   • Ovarian cancer Cousin 42   • Heart failure Paternal Grandmother    • Endometrial cancer Neg Hx    • Colon cancer Neg Hx        Social History     Socioeconomic History   • Marital status:      Spouse name: Not on file   • Number of children: Not on file   • Years of education: Not on file   • Highest education level: Not on file   Occupational History   • Occupation:    Tobacco Use   • Smoking status: Former Smoker     Packs/day: 1.00     Years: 3.00     Pack years: 3.00     Types: Cigarettes     Quit date: 2009     Years since quittin.2   • Smokeless tobacco: Never Used   Substance and Sexual Activity   • Alcohol use: Yes     Comment: social   • Drug use: No   • Sexual activity: Defer   Lifestyle   • Physical activity     Days per week: 0 days     Minutes per session: Not on file   • Stress: Very much   Social History Narrative    Caffeine: 2 serving per day.         LMP  (LMP Unknown)     PHYSICAL EXAM  Physical Exam   Constitutional: She is oriented to person, place, and time. She appears well-developed and well-nourished. She does not appear ill.   HENT:   Head: Normocephalic and atraumatic.   Neurological: She is alert and oriented to person, place, and time.         Diagnoses and all orders for this visit:    1. Close exposure to COVID-19 virus (Primary)  --chooses not to be tested at this time  -- is positive    2. Lower respiratory infection (e.g., bronchitis, pneumonia, pneumonitis, pulmonitis)  -     doxycycline (VIBRAMYCIN) 100 MG capsule; Take 1 capsule by mouth 2 (Two) Times a Day for 10 days.   Dispense: 20 capsule; Refill: 0  -     benzonatate (TESSALON) 200 MG capsule; Take 1 capsule by mouth 3 (Three) Times a Day As Needed for Cough.  Dispense: 21 capsule; Refill: 0  --complete doxycycline as prescribed for possible infection  --Tessalon as prescribed for throat irritation and occasional cough  --albuterol inhaler every 4-6 hours as needed for cough and /or wheezing  --increase intake of clear, decaffeinated fluids to help thin secretions and maintain hydration.  --tylenol or ibuprofen for fever or pain    **if at any time experiences fever AND/OR worsening cough AND/OR shortness of breath AND/OR loss of sense of taste or smell, has been advised to go to nearest urgent care or emergency department for evaluation AND/OR testing    **if no improvement in 5-7 days, but not worsening, may schedule a f/u virtual visit or E-visit      FOLLOW-UP  As discussed during visit with PCP/Virtual Care if no improvement or Urgent Care/Emergency Department if worsening of symptoms    Patient verbalizes understanding of medication dosage, comfort measures, instructions for treatment and follow-up.    CLAY Short  10/23/2020  13:00 EDT    This visit was performed via Telehealth.  This patient has been instructed to follow-up with their primary care provider if their symptoms worsen or the treatment provided does not resolve their illness.

## 2020-10-23 NOTE — PATIENT INSTRUCTIONS
Acute Bronchitis, Adult    Acute bronchitis is sudden or acute swelling of the air tubes (bronchi) in the lungs. Acute bronchitis causes these tubes to fill with mucus, which can make it hard to breathe. It can also cause coughing or wheezing.  In adults, acute bronchitis usually goes away within 2 weeks. A cough caused by bronchitis may last up to 3 weeks. Smoking, allergies, and asthma can make the condition worse.  What are the causes?  This condition can be caused by germs and by substances that irritate the lungs, including:  · Cold and flu viruses. The most common cause of this condition is the virus that causes the common cold.  · Bacteria.  · Substances that irritate the lungs, including:  ? Smoke from cigarettes and other forms of tobacco.  ? Dust and pollen.  ? Fumes from chemical products, gases, or burned fuel.  ? Other materials that pollute indoor or outdoor air.  · Close contact with someone who has acute bronchitis.  What increases the risk?  The following factors may make you more likely to develop this condition:  · A weak body's defense system, also called the immune system.  · A condition that affects your lungs and breathing, such as asthma.  What are the signs or symptoms?  Common symptoms of this condition include:  · Lung and breathing problems, such as:  ? Coughing. This may bring up clear, yellow, or green mucus from your lungs (sputum).  ? Wheezing.  ? Having too much mucus in your lungs (chest congestion).  ? Having shortness of breath.  · A fever.  · Chills.  · Aches and pains, including:  ? Tightness in your chest and other body aches.  ? A sore throat.  How is this diagnosed?  This condition is usually diagnosed based on:  · Your symptoms and medical history.  · A physical exam.  You may also have other tests, including tests to rule out other conditions, such pneumonia. These tests include:  · A test of lung function.  · Test of a mucus sample to look for the presence of  bacteria.  · Tests to check the oxygen level in your blood.  · Blood tests.  · Chest X-ray.  How is this treated?  Most cases of acute bronchitis clear up over time without treatment. Your health care provider may recommend:  · Drinking more fluids. This can thin your mucus, which may improve your breathing.  · Taking a medicine for a fever or cough.  · Using a device that gets medicine into your lungs (inhaler) to help improve breathing and control coughing.  · Using a vaporizer or a humidifier. These are machines that add water to the air to help you breathe better.  Follow these instructions at home:  Activity  · Get plenty of rest.  · Return to your normal activities as told by your health care provider. Ask your health care provider what activities are safe for you.  Lifestyle  · Drink enough fluid to keep your urine pale yellow.  · Do not drink alcohol.  · Do not use any products that contain nicotine or tobacco, such as cigarettes, e-cigarettes, and chewing tobacco. If you need help quitting, ask your health care provider. Be aware that:  ? Your bronchitis will get worse if you smoke or breathe in other people's smoke (secondhand smoke).  ? Your lungs will heal faster if you quit smoking.  General instructions    · Take over-the-counter and prescription medicines only as told by your health care provider.  · Use an inhaler, vaporizer, or humidifier as told by your health care provider.  · If you have a sore throat, gargle with a salt-water mixture 3-4 times a day or as needed. To make a salt-water mixture, completely dissolve ½-1 tsp (3-6 g) of salt in 1 cup (237 mL) of warm water.  · Keep all follow-up visits as told by your health care provider. This is important.  How is this prevented?  To lower your risk of getting this condition again:  · Wash your hands often with soap and water. If soap and water are not available, use hand .  · Avoid contact with people who have cold symptoms.  · Try not to  touch your mouth, nose, or eyes with your hands.  · Avoid places where there are fumes from chemicals. Breathing these fumes will make your condition worse.  · Get the flu shot every year.  Contact a health care provider if:  · Your symptoms do not improve after 2 weeks of treatment.  · You vomit more than once or twice.  · You have symptoms of dehydration such as:  ? Dark urine.  ? Dry skin or eyes.  ? Increased thirst.  ? Headaches.  ? Confusion.  ? Muscle cramps.  Get help right away if you:  · Cough up blood.  · Feel pain in your chest.  · Have severe shortness of breath.  · Faint or keep feeling like you are going to faint.  · Have a severe headache.  · Have fever or chills that get worse.  These symptoms may represent a serious problem that is an emergency. Do not wait to see if the symptoms will go away. Get medical help right away. Call your local emergency services (911 in the [place] Do not drive yourself to the hospital.  Summary  · Acute bronchitis is sudden (acute) inflammation of the air tubes (bronchi) between the windpipe and the lungs. In adults, acute bronchitis usually goes away within 2 weeks, although coughing may last 3 weeks or longer  · Take over-the-counter and prescription medicines only as told by your health care provider.  · Drink enough fluid to keep your urine pale yellow.  · Contact a health care provider if your symptoms do not improve after 2 weeks of treatment.  · Get help right away if you cough up blood, faint, or have chest pain or shortness of breath.  This information is not intended to replace advice given to you by your health care provider. Make sure you discuss any questions you have with your health care provider.  Document Released: 01/25/2006 Document Revised: 08/31/2020 Document Reviewed: 07/10/2020  [employer]vier Patient Education © 2020 [employer]

## 2020-10-26 PROCEDURE — U0003 INFECTIOUS AGENT DETECTION BY NUCLEIC ACID (DNA OR RNA); SEVERE ACUTE RESPIRATORY SYNDROME CORONAVIRUS 2 (SARS-COV-2) (CORONAVIRUS DISEASE [COVID-19]), AMPLIFIED PROBE TECHNIQUE, MAKING USE OF HIGH THROUGHPUT TECHNOLOGIES AS DESCRIBED BY CMS-2020-01-R: HCPCS | Performed by: FAMILY MEDICINE

## 2020-10-28 ENCOUNTER — TELEPHONE (OUTPATIENT)
Dept: URGENT CARE | Facility: HOSPITAL | Age: 50
End: 2020-10-28

## 2020-10-28 NOTE — TELEPHONE ENCOUNTER
Patient and whole family was seen and she has question her  is positive and her and the children are negative she has been in quarantine since 10/08 how much longer do they have to stay in quarantine and should they get retested for Covid  Please call

## 2020-11-17 DIAGNOSIS — Z20.822 ENCOUNTER FOR SCREENING LABORATORY TESTING FOR COVID-19 VIRUS: Primary | ICD-10-CM

## 2020-11-19 LAB — SARS-COV-2 AB SERPL QL IA: NEGATIVE

## 2020-12-01 ENCOUNTER — CLINICAL SUPPORT (OUTPATIENT)
Dept: ORTHOPEDIC SURGERY | Facility: CLINIC | Age: 50
End: 2020-12-01

## 2020-12-01 DIAGNOSIS — M17.12 PRIMARY OSTEOARTHRITIS OF LEFT KNEE: Primary | ICD-10-CM

## 2020-12-01 PROCEDURE — 20610 DRAIN/INJ JOINT/BURSA W/O US: CPT | Performed by: ORTHOPAEDIC SURGERY

## 2020-12-01 NOTE — PROGRESS NOTES
Procedure   Large Joint Arthrocentesis: L knee  Date/Time: 12/1/2020 10:56 AM  Site marked: site marked  Timeout: Immediately prior to procedure a time out was called to verify the correct patient, procedure, equipment, support staff and site/side marked as required   Supporting Documentation  Indications: pain   Procedure Details  Location: knee - L knee  Preparation: Patient was prepped and draped in the usual sterile fashion  Needle size: 22 G  Approach: anterolateral  Medications administered: 30 mg Hyaluronan 30 MG/2ML  Patient tolerance: patient tolerated the procedure well with no immediate complications

## 2020-12-01 NOTE — PROGRESS NOTES
Patient returns for initiation of Orthovisc injections in the left knee.  Denies interval changes since her last visit.    Procedure Note:  I discussed with the patient the potential benefits of performing a therapeutic injection of the left knee as well as potential risks including but not limited to infection, swelling, pain, bleeding, bruising, nerve/vessel damage, pseudoseptic reaction, and worsening joint pain. After informed consent and verifying correct patient, procedure site, and type of procedure, the area was prepped with alcohol, ethyl chloride was used to numb the skin. Via the superolateral approach, the viscosupplementation syringe contents were injected into the left knee. The patient tolerated the procedure well. There were no complications. A sterile dressing was placed over the injection site.    Follow up 1 week.

## 2020-12-08 ENCOUNTER — CLINICAL SUPPORT (OUTPATIENT)
Dept: ORTHOPEDIC SURGERY | Facility: CLINIC | Age: 50
End: 2020-12-08

## 2020-12-08 DIAGNOSIS — M17.12 PRIMARY OSTEOARTHRITIS OF LEFT KNEE: Primary | ICD-10-CM

## 2020-12-08 PROCEDURE — 20610 DRAIN/INJ JOINT/BURSA W/O US: CPT | Performed by: ORTHOPAEDIC SURGERY

## 2020-12-08 NOTE — PROGRESS NOTES
Procedure   Large Joint Arthrocentesis: L knee  Date/Time: 12/8/2020 7:39 AM  Consent given by: patient  Site marked: site marked  Timeout: Immediately prior to procedure a time out was called to verify the correct patient, procedure, equipment, support staff and site/side marked as required   Supporting Documentation  Indications: pain   Procedure Details  Location: knee - L knee  Preparation: Patient was prepped and draped in the usual sterile fashion  Needle size: 22 G  Approach: anterolateral  Medications administered: 30 mg Hyaluronan 30 MG/2ML  Patient tolerance: patient tolerated the procedure well with no immediate complications

## 2020-12-08 NOTE — PROGRESS NOTES
Patient returns for second Orthovisc injection left knee.  Denies complications with the first injection.  Overall pain is improved.    Procedure Note:  I discussed with the patient the potential benefits of performing a therapeutic injection of the left knee as well as potential risks including but not limited to infection, swelling, pain, bleeding, bruising, nerve/vessel damage, pseudoseptic reaction, and worsening joint pain. After informed consent and verifying correct patient, procedure site, and type of procedure, the area was prepped with alcohol, ethyl chloride was used to numb the skin. Via the superolateral approach, the viscosupplementation syringe contents were injected into the left knee. The patient tolerated the procedure well. There were no complications. A sterile dressing was placed over the injection site.    Follow up 1 week.

## 2020-12-15 ENCOUNTER — CLINICAL SUPPORT (OUTPATIENT)
Dept: ORTHOPEDIC SURGERY | Facility: CLINIC | Age: 50
End: 2020-12-15

## 2020-12-15 DIAGNOSIS — M17.12 PRIMARY OSTEOARTHRITIS OF LEFT KNEE: Primary | ICD-10-CM

## 2020-12-15 PROCEDURE — 20610 DRAIN/INJ JOINT/BURSA W/O US: CPT | Performed by: ORTHOPAEDIC SURGERY

## 2020-12-15 NOTE — PROGRESS NOTES
Patient returns for third Orthovisc injection left knee.  Denies complications with the prior injections.  Overall her pain has improved.    Procedure Note:  I discussed with the patient the potential benefits of performing a therapeutic injection of the left knee as well as potential risks including but not limited to infection, swelling, pain, bleeding, bruising, nerve/vessel damage, pseudoseptic reaction, and worsening joint pain. After informed consent and verifying correct patient, procedure site, and type of procedure, the area was prepped with alcohol, ethyl chloride was used to numb the skin. Via the superolateral approach, the viscosupplementation syringe contents were injected into the left knee. The patient tolerated the procedure well. There were no complications. A sterile dressing was placed over the injection site.    Follow up as needed.

## 2020-12-15 NOTE — PROGRESS NOTES
Procedure   Large Joint Arthrocentesis: L knee  Date/Time: 12/15/2020 7:55 AM  Consent given by: patient  Site marked: site marked  Timeout: Immediately prior to procedure a time out was called to verify the correct patient, procedure, equipment, support staff and site/side marked as required   Supporting Documentation  Indications: pain   Procedure Details  Location: knee - L knee  Preparation: Patient was prepped and draped in the usual sterile fashion  Needle size: 22 G  Approach: anterolateral  Medications administered: 30 mg Hyaluronan 30 MG/2ML  Patient tolerance: patient tolerated the procedure well with no immediate complications

## 2020-12-28 ENCOUNTER — OFFICE VISIT (OUTPATIENT)
Dept: INTERNAL MEDICINE | Facility: CLINIC | Age: 50
End: 2020-12-28

## 2020-12-28 VITALS
DIASTOLIC BLOOD PRESSURE: 100 MMHG | BODY MASS INDEX: 31.58 KG/M2 | SYSTOLIC BLOOD PRESSURE: 122 MMHG | HEART RATE: 103 BPM | OXYGEN SATURATION: 98 % | WEIGHT: 184 LBS

## 2020-12-28 DIAGNOSIS — R42 VERTIGO: Primary | ICD-10-CM

## 2020-12-28 PROCEDURE — 99213 OFFICE O/P EST LOW 20 MIN: CPT | Performed by: PHYSICIAN ASSISTANT

## 2020-12-28 RX ORDER — CETIRIZINE HYDROCHLORIDE 10 MG/1
10 TABLET ORAL DAILY
COMMUNITY

## 2020-12-28 RX ORDER — METHYLPREDNISOLONE 4 MG/1
TABLET ORAL
Qty: 21 EACH | Refills: 0 | Status: SHIPPED | OUTPATIENT
Start: 2020-12-28 | End: 2021-01-18

## 2020-12-28 RX ORDER — MECLIZINE HYDROCHLORIDE 25 MG/1
25 TABLET ORAL 3 TIMES DAILY PRN
Qty: 20 TABLET | Refills: 0 | Status: SHIPPED | OUTPATIENT
Start: 2020-12-28 | End: 2021-06-21

## 2020-12-28 RX ORDER — ASPIRIN 325 MG
81 TABLET, DELAYED RELEASE (ENTERIC COATED) ORAL EVERY 6 HOURS PRN
COMMUNITY
End: 2022-02-23

## 2020-12-28 NOTE — PROGRESS NOTES
Chief Complaint   Patient presents with   • Dizziness     Acute        Subjective     Gino Maguire is a 50 y.o. female.        History of Present Illness     Pt has had some feelings of being dizzy when getting out of bed for the past 2 weeks. Continues to have symptoms with moving her head, rolling over or bending over. She has been congested since she had bronchitis in November. She has been taking antihistamines and using flonase. No vomiting, took phenergan twice due to some nausea.    Her father has Meniere's disease, was diagnosed in his 50s.        Current Outpatient Medications:   •  albuterol sulfate  (90 Base) MCG/ACT inhaler, Inhale 2 puffs Every 4 (Four) Hours As Needed for Wheezing., Disp: 1 inhaler, Rfl: 5  •  ALPRAZolam (XANAX) 0.25 MG tablet, Take 1 tablet by mouth At Night As Needed for Anxiety or Sleep. One po bid prn, Disp: 30 tablet, Rfl: 0  •  aspirin  MG tablet, Take 325 mg by mouth Every 6 (Six) Hours As Needed., Disp: , Rfl:   •  buPROPion XL (Wellbutrin XL) 150 MG 24 hr tablet, Take 150 mg by mouth Daily., Disp: , Rfl:   •  cetirizine (zyrTEC) 10 MG tablet, Take 10 mg by mouth Daily., Disp: , Rfl:   •  Ergocalciferol (VITAMIN D2 PO), Take 1 capsule by mouth., Disp: , Rfl:   •  fluticasone (FLONASE) 50 MCG/ACT nasal spray, 2 sprays into the nostril(s) as directed by provider., Disp: , Rfl:   •  hydroCHLOROthiazide (HYDRODIURIL) 12.5 MG tablet, Take 1 tablet by mouth Daily., Disp: 30 tablet, Rfl: 11  •  L-methylfolate Calcium 15 MG tablet, Take 1 tablet by mouth Daily., Disp: , Rfl:   •  meloxicam (MOBIC) 15 MG tablet, 1 PO Daily with food., Disp: 60 tablet, Rfl: 0  •  methocarbamol (ROBAXIN) 750 MG tablet, Take 1 tablet by mouth 4 (Four) Times a Day As Needed for Muscle Spasms., Disp: 90 tablet, Rfl: 0  •  Multiple Vitamins-Minerals (MULTIVITAMIN ADULT PO), Take  by mouth., Disp: , Rfl:   •  promethazine (PHENERGAN) 25 MG tablet, Take 1 tablet by mouth Every 8 (Eight)  Hours As Needed for Nausea or Vomiting., Disp: 21 tablet, Rfl: 2  •  propranolol (INDERAL) 10 MG tablet, Take 1 tablet by mouth 2 (Two) Times a Day., Disp: 60 tablet, Rfl: 11  •  vitamin B-12 (CYANOCOBALAMIN) 1000 MCG tablet, Take 1,000 mcg by mouth Daily., Disp: , Rfl:   •  meclizine (ANTIVERT) 25 MG tablet, Take 1 tablet by mouth 3 (Three) Times a Day As Needed for Dizziness., Disp: 20 tablet, Rfl: 0  •  methylPREDNISolone (MEDROL) 4 MG dose pack, Take as directed on package instructions., Disp: 21 each, Rfl: 0     PMFSH  The following portions of the patient's history were reviewed and updated as appropriate: allergies, current medications, past family history, past medical history, past social history, past surgical history and problem list.    Review of Systems   Constitutional: Negative for appetite change, chills, fever and unexpected weight change.   HENT: Positive for congestion and rhinorrhea. Negative for ear discharge, sinus pressure and tinnitus.    Eyes: Negative for photophobia and pain.   Respiratory: Negative for chest tightness, shortness of breath and wheezing.    Cardiovascular: Negative for chest pain and palpitations.   Gastrointestinal: Negative for abdominal pain and blood in stool.   Endocrine: Negative for cold intolerance, heat intolerance, polydipsia and polyphagia.   Genitourinary: Negative.    Musculoskeletal: Negative for joint swelling.   Skin: Negative for color change and wound.   Allergic/Immunologic: Negative.    Neurological: Positive for dizziness. Negative for syncope, facial asymmetry, speech difficulty, weakness and light-headedness.   Hematological: Negative for adenopathy.   Psychiatric/Behavioral: Negative for confusion and hallucinations.       Objective   /100   Pulse 103   Wt 83.5 kg (184 lb)   LMP  (LMP Unknown)   SpO2 98%   BMI 31.58 kg/m²     Physical Exam  Vitals signs and nursing note reviewed.   Constitutional:       General: She is not in acute  distress.     Appearance: She is well-developed. She is not diaphoretic.   HENT:      Head: Normocephalic and atraumatic.   Eyes:      General: Lids are normal. No scleral icterus.     Extraocular Movements:      Right eye: Normal extraocular motion and no nystagmus.      Left eye: Normal extraocular motion and no nystagmus.      Conjunctiva/sclera: Conjunctivae normal.      Pupils: Pupils are equal, round, and reactive to light.   Cardiovascular:      Rate and Rhythm: Normal rate and regular rhythm.      Pulses: Normal pulses.      Heart sounds: Normal heart sounds. No murmur.   Pulmonary:      Effort: Pulmonary effort is normal. No respiratory distress.      Breath sounds: Normal breath sounds. No wheezing or rales.   Chest:      Chest wall: No tenderness.   Abdominal:      Palpations: Abdomen is soft.   Musculoskeletal: Normal range of motion.         General: No deformity.   Lymphadenopathy:      Cervical: No cervical adenopathy.   Neurological:      Mental Status: She is alert and oriented to person, place, and time. She is not disoriented.      Cranial Nerves: No cranial nerve deficit.      Sensory: No sensory deficit.      Motor: No tremor, atrophy or abnormal muscle tone.      Coordination: Coordination normal.      Gait: Gait normal.      Deep Tendon Reflexes: Reflexes are normal and symmetric.   Psychiatric:         Behavior: Behavior normal.         Thought Content: Thought content normal.         Judgment: Judgment normal.         ASSESSMENT/PLAN    Diagnoses and all orders for this visit:    1. Vertigo (Primary)  Comments:  Start medrol dose pack as directed to help with any contributing ETD. Use meclizine prn. Refer for vestibular rehab.  Orders:  -     methylPREDNISolone (MEDROL) 4 MG dose pack; Take as directed on package instructions.  Dispense: 21 each; Refill: 0  -     Ambulatory Referral to Physical Therapy Evaluate and treat, Vestibular  -     meclizine (ANTIVERT) 25 MG tablet; Take 1 tablet by  mouth 3 (Three) Times a Day As Needed for Dizziness.  Dispense: 20 tablet; Refill: 0             Return if symptoms worsen or fail to improve.

## 2021-01-08 ENCOUNTER — HOSPITAL ENCOUNTER (OUTPATIENT)
Dept: PHYSICAL THERAPY | Facility: HOSPITAL | Age: 51
Setting detail: THERAPIES SERIES
Discharge: HOME | End: 2021-01-08

## 2021-01-08 DIAGNOSIS — H81.11 BENIGN PAROXYSMAL POSITIONAL VERTIGO OF RIGHT EAR: Primary | ICD-10-CM

## 2021-01-08 PROCEDURE — 95992 CANALITH REPOSITIONING PROC: CPT

## 2021-01-08 NOTE — THERAPY EVALUATION
Outpatient Physical Therapy Vestibular Initial Evaluation  [facility]     Patient Name: Gino Maguire  : 1970  MRN: [id number]  Today's Date: 2021      Visit Date: 2021    Patient Active Problem List   Diagnosis   • Right upper quadrant pain   • Anxiety   • Arthritis   • Axillary lymphadenopathy   • Chronic antral gastritis   • Eosinophilic esophagitis   • Hypertension   • Labyrinthitis   • Right-sided low back pain with right-sided sciatica   • Cyst of ovary   • Prolonged depressive adjustment reaction   • Arthralgia of hip   • Herpes zoster   • Strain of thoracic region   • Thrush, oral   • Grief reaction   • Eyelid gland swelling   • Gestational diabetes   • Pain of right heel   • Right flank pain   • Displacement of lumbar intervertebral disc   • Lumbar foraminal stenosis   • Greater trochanteric bursitis of both hips   • Bilateral primary osteoarthritis of knee   • Myofascial pain   • Physical deconditioning   • Insomnia   • Cervical spondylosis without myelopathy   • RLQ abdominal pain   • Great toe pain, right   • Myalgia   • Rash and other nonspecific skin eruption        Past Medical History:   Diagnosis Date   • Abdominal pain    • Abdominal pain, RUQ     Check cmp. refer to [facility] u/s and gen surg eval d/t cholelithiasis noted on CT of abd/pelvis donein ER    • Acute bronchitis     Take cefdinir and medrol dose pack as directed. Use otc mucinex. Continue to rest and push fluids. Call or rtc if no better. Gave phenergan w/ codeine cough syrup 5 ml po prn #120ml, no RF per Dr. Cesar   • Acute pharyngitis     Check for monod/t exposure/ Pt will restart flovent as used for esophagitis. If no improvement take the medrol dose pack she was given at least visit   • Acute sinusitis    • Acute upper respiratory infection    • Anxiety    • Body aches    • Cholelithiasis     Reviewed CT of adb/pelvis from ER  showed cholelithiasis. Will refer for gen surgery eval and [facility] u/s   •  Chronic pain disorder    • Cough    • Eosinophilic esophagitis    • Extremity pain    • Gestational diabetes    • Headache    • Influenza    • Labyrinthitis    • Low back pain    • Lumbar strain    • Lumbosacral disc disease    • Neck pain    • Right hip pain    • Shingles    • Strain of thoracic region    • Viral infection    • Viral pharyngitis         Past Surgical History:   Procedure Laterality Date   • BREAST BIOPSY Left 10/2018    Benign STEREO    •  SECTION      97, 00, 04, 12   • DILATATION AND CURETTAGE     • ENDOSCOPY     • OOPHORECTOMY Right 2017   • WISDOM TOOTH EXTRACTION           Visit Dx:     ICD-10-CM ICD-9-CM   1. Benign paroxysmal positional vertigo of right ear  H81.11 386.11       Patient History     Row Name 21 1015             History    Chief Complaint  Difficulty with daily activities;Dizziness  -MM      Date Current Problem(s) Began  20  -MM      Brief Description of Current Complaint  Client presents with room spinning dizziness that started about a month ago.  She noticed symptoms when looking up into the right and when lying down and rolling over.  Symptoms have remained and dizziness increases with head positioning.  -MM      Patient/Caregiver Goals  Relief from dizziness  -MM      Occupation/sports/leisure activities  Enjoys music and dance  -MM         Pain     Difficulties with ADL's?  Looking up, looking down, rolling over, fast head turns  -MM         Daily Activities    Primary Language  English  -MM      Are you able to read  Yes  -MM      Are you able to write  Yes  -MM      Barriers to learning  None  -MM      Pt Participated in POC and Goals  Yes  -MM        User Key  (r) = Recorded By, (t) = Taken By, (c) = Cosigned By    Initials Name Provider Type    MM Shay Taylor, PT Physical Therapist          Vestibular Eval     Row Name 21 1015             Vestibular Objective    Use of Assistive Devices  None  -MM          Symptom Behavior    Type of Dizziness  Spinning  -MM      Frequency of Dizziness  -- Daily  -MM      Duration of Dizziness  -- Less than 1 minute  -MM      VAS Intensity (0-10)  -- 4-7/10  -MM      Aggravating Factors  Lying supine;Looking up to the ceiling;Turning head quickly;Turning body quickly;Turning head sideways;Forward bending;Rolling to right  -MM      Relieving Factors  Rest;Slow movements;Head stationary  -MM         Positional Testing    [place]-Hallpike Right  Upbeat, right rotatory nystagmus  -MM      [place]-Hallpike Right Onset Time   Couple seconds  -MM      [place]-Hallpike Right Duration Time   Less than 30 seconds  -MM        User Key  (r) = Recorded By, (t) = Taken By, (c) = Cosigned By    Initials Name Provider Type    Shay Petersen, PT Physical Therapist        OP Exercises     Row Name 01/08/21 1015             Exercise 1    Exercise Name 1  Included Epley maneuver x2 for right posterior canal BPPV.  -MM      Cueing 1  Verbal;Tactile;Demo  -MM        User Key  (r) = Recorded By, (t) = Taken By, (c) = Cosigned By    Initials Name Provider Type    Shay Petersen, PT Physical Therapist          PT OP Goals     Row Name 01/08/21 1015          PT Short Term Goals    STG Date to Achieve  01/29/21  -MM     STG 1  Dizziness will completely resolve with daily activity.  -MM     STG 1 Progress  New  -MM     STG 2  Right [place]-Hallpike test will be negative.  -MM     STG 2 Progress  New  -MM     STG 3  Client will improve to rolling over in lying down in bed without difficulty.  -MM     STG 3 Progress  New  -MM     STG 4  Client will be independent with home program to minimize symptoms.  -MM     STG 4 Progress  New  -MM        Long Term Goals    LTG Date to Achieve  02/19/21  -MM     LTG 1  Client will perform fast head turns right and left without difficulty.  -MM     LTG 1 Progress  New  -MM     LTG 2  Client will bend over to  items from the floor without difficulty.  -MM     LTG 2 Progress   New  -MM     LTG 3  Dizziness handicap score will improve to 10% or better.  -MM     LTG 3 Progress  New  -MM        Time Calculation    PT Goal Re-Cert Due Date  21  -MM       User Key  (r) = Recorded By, (t) = Taken By, (c) = Cosigned By    Initials Name Provider Type    Shay Petersen, PT Physical Therapist          PT Assessment/Plan     Row Name 21 1015          PT Assessment    Functional Limitations  Limitation in home management;Limitations in functional capacity and performance;Performance in leisure activities;Performance in self-care ADL  -MM     Impairments  Other (comment) Dizziness  -MM     Assessment Comments  Client presents with evolving symptoms of low complexity.  Signs and symptoms are consistent with dizziness related to right posterior canal BPPV.  Client did respond well to the Epley maneuver in the clinic.  Further skilled care is required to normalize symptoms.  -MM     Please refer to paper survey for additional self-reported information  Yes  -MM     Rehab Potential  Good  -MM     Patient/caregiver participated in establishment of treatment plan and goals  Yes  -MM     Patient would benefit from skilled therapy intervention  Yes  -MM        PT Plan    PT Frequency  1x/week  -MM     Predicted Duration of Therapy Intervention (PT)  4-6 visits  -MM     Planned CPT's?  PT CANALITH REPOSITIONIN;PT NEUROMUSC RE-EDUCATION EA 15 MIN: [id number]  -MM     PT Plan Comments  Check for BPPV again next visit.  If BPPV has resolved and client continues with dizziness check for vestibular hypofunction.  -MM       User Key  (r) = Recorded By, (t) = Taken By, (c) = Cosigned By    Initials Name Provider Type    Shay Petersen, PT Physical Therapist           Outcome Measure Options: Dizziness Handicap Inventory(52%)     Time Calculation:   Start Time: 1015   Therapy Charges for Today     Code Description Service Date Service Provider Modifiers Qty    [id number]  PT CANALITH  REPOSITIONING PER DAY 1/8/2021 Shay Taylor, PT GP 1          PT G-Codes  Outcome Measure Options: Dizziness Handicap Inventory(52%)         Shay Taylor, PT  1/8/2021

## 2021-01-12 DIAGNOSIS — I10 ESSENTIAL HYPERTENSION: ICD-10-CM

## 2021-01-12 RX ORDER — HYDROCHLOROTHIAZIDE 12.5 MG/1
12.5 TABLET ORAL DAILY
Qty: 30 TABLET | Refills: 0 | Status: SHIPPED | OUTPATIENT
Start: 2021-01-12 | End: 2021-01-18 | Stop reason: SDUPTHER

## 2021-01-12 NOTE — TELEPHONE ENCOUNTER
LAST REFILL 1/24/20 #30 11RF, LOV 7/14/20, HAD ONE WITH XIOMY ON 12/28/20, ANNUAL GIRISH WITH YOU ON 1/18/21

## 2021-01-13 ENCOUNTER — HOSPITAL ENCOUNTER (OUTPATIENT)
Dept: PHYSICAL THERAPY | Facility: HOSPITAL | Age: 51
Setting detail: THERAPIES SERIES
Discharge: HOME | End: 2021-01-13

## 2021-01-13 DIAGNOSIS — H83.2X3 VESTIBULAR HYPOFUNCTION OF BOTH EARS: ICD-10-CM

## 2021-01-13 DIAGNOSIS — H81.11 BENIGN PAROXYSMAL POSITIONAL VERTIGO OF RIGHT EAR: Primary | ICD-10-CM

## 2021-01-13 PROCEDURE — 97112 NEUROMUSCULAR REEDUCATION: CPT

## 2021-01-13 NOTE — THERAPY PROGRESS REPORT/RE-CERT
Outpatient Physical Therapy Vestibular Progress Note   [facility]     Patient Name: Gino Maguire  : 1970  MRN: [id number]  Today's Date: 2021      Visit Date: 2021    Visit Dx:     ICD-10-CM ICD-9-CM   1. Benign paroxysmal positional vertigo of right ear  H81.11 386.11   2. Vestibular hypofunction of both ears  H83.2X3 386.50       Patient Active Problem List   Diagnosis   • Right upper quadrant pain   • Anxiety   • Arthritis   • Axillary lymphadenopathy   • Chronic antral gastritis   • Eosinophilic esophagitis   • Hypertension   • Labyrinthitis   • Right-sided low back pain with right-sided sciatica   • Cyst of ovary   • Prolonged depressive adjustment reaction   • Arthralgia of hip   • Herpes zoster   • Strain of thoracic region   • Thrush, oral   • Grief reaction   • Eyelid gland swelling   • Gestational diabetes   • Pain of right heel   • Right flank pain   • Displacement of lumbar intervertebral disc   • Lumbar foraminal stenosis   • Greater trochanteric bursitis of both hips   • Bilateral primary osteoarthritis of knee   • Myofascial pain   • Physical deconditioning   • Insomnia   • Cervical spondylosis without myelopathy   • RLQ abdominal pain   • Great toe pain, right   • Myalgia   • Rash and other nonspecific skin eruption       Vestibular Eval     Row Name 21 1300             Vestibulo-Occular Reflex (VOR)    VOR to Fast Head Movement/Head Thrust Test  Positive bilaterally  -MM         Positional Testing    [place]-Hallpike Right  No nystagmus  -MM      [place]-Hallpike Left  No nystagmus  -MM      Horizontal Roll Test Right  No nystagmus  -MM      Horizontal Roll Test Left  No nystagmus  -MM        User Key  (r) = Recorded By, (t) = Taken By, (c) = Cosigned By    Initials Name Provider Type    Shay Petersen, PT Physical Therapist          PT Assessment/Plan     Row Name 21 0945          PT Assessment    Assessment Comments  Good progress. It appears that BPPV  resolved, but she does have signs of vestibular hypofunction bilateral. She should respond well to vestibular rehab exercises.  -MM        PT Plan    PT Plan Comments  Continue at 1x/week for 3-4 more visits for vestibular rehab.  -MM       User Key  (r) = Recorded By, (t) = Taken By, (c) = Cosigned By    Initials Name Provider Type    Shay Petersen, PT Physical Therapist         OP Exercises     Row Name 01/13/21 0945             Subjective Comments    Subjective Comments  Client reports significant improvement in dizziness overall. She noticed a subtle dizziness this morning for a couple seconds. She reports some lightheadedness with fast head turns.  -MM         Total Minutes    42474 -  PT Neuromuscular Reeducation Minutes  30  -MM         Exercise 1    Exercise Name 1  Positional tests for BPPV were all negative. Vestibular program was started due to some impairment with VOR function. Exercises included: VOR-1, locating 3 targets, and standing full turn.  -MM      Cueing 1  Verbal  -MM      Time 1  30  -MM      Additional Comments  neuromuscular re-ed  -MM        User Key  (r) = Recorded By, (t) = Taken By, (c) = Cosigned By    Initials Name Provider Type    Shay Petersen PT Physical Therapist        Time Calculation:   Start Time: 0945   Therapy Charges for Today     Code Description Service Date Service Provider Modifiers Qty    78403357278  PT NEUROMUSC RE EDUCATION EA 15 MIN 1/13/2021 Shay Taylor, PT GP 2      Shay Taylor, PT  1/13/2021

## 2021-01-14 ENCOUNTER — TELEPHONE (OUTPATIENT)
Dept: ORTHOPEDICS | Facility: OTHER | Age: 51
End: 2021-01-14

## 2021-01-14 NOTE — TELEPHONE ENCOUNTER
Caller: MRS LANDIN    Relationship to patient: PT     Type of visit: RT KNEE F/U     If rescheduling, when is the original appointment: 1/14/21    Additional notes:LATE CANCELLATION PATIENT HAD A PET EMERGENCY COME UP AND R/S FOR 1/21/21

## 2021-01-18 ENCOUNTER — OFFICE VISIT (OUTPATIENT)
Dept: INTERNAL MEDICINE | Facility: CLINIC | Age: 51
End: 2021-01-18

## 2021-01-18 VITALS
WEIGHT: 185 LBS | SYSTOLIC BLOOD PRESSURE: 142 MMHG | HEART RATE: 66 BPM | TEMPERATURE: 97.7 F | DIASTOLIC BLOOD PRESSURE: 90 MMHG | OXYGEN SATURATION: 99 % | BODY MASS INDEX: 31.58 KG/M2 | HEIGHT: 64 IN

## 2021-01-18 DIAGNOSIS — E53.8 B12 DEFICIENCY: ICD-10-CM

## 2021-01-18 DIAGNOSIS — I10 ESSENTIAL HYPERTENSION: ICD-10-CM

## 2021-01-18 DIAGNOSIS — Z00.00 ANNUAL PHYSICAL EXAM: Primary | ICD-10-CM

## 2021-01-18 DIAGNOSIS — R05.9 COUGH: ICD-10-CM

## 2021-01-18 DIAGNOSIS — G50.0 TRIGEMINAL NEURALGIA: ICD-10-CM

## 2021-01-18 DIAGNOSIS — Z12.31 ENCOUNTER FOR SCREENING MAMMOGRAM FOR MALIGNANT NEOPLASM OF BREAST: ICD-10-CM

## 2021-01-18 PROCEDURE — 99396 PREV VISIT EST AGE 40-64: CPT | Performed by: NURSE PRACTITIONER

## 2021-01-18 RX ORDER — ALBUTEROL SULFATE 90 UG/1
2 AEROSOL, METERED RESPIRATORY (INHALATION) EVERY 4 HOURS PRN
Qty: 18 G | Refills: 11 | Status: SHIPPED | OUTPATIENT
Start: 2021-01-18

## 2021-01-18 RX ORDER — HYDROCHLOROTHIAZIDE 12.5 MG/1
12.5 TABLET ORAL DAILY
Qty: 30 TABLET | Refills: 11 | Status: SHIPPED | OUTPATIENT
Start: 2021-01-18 | End: 2021-10-19 | Stop reason: SDUPTHER

## 2021-01-18 NOTE — PROGRESS NOTES
Chief Complaint   Patient presents with   • Annual Exam       History of Present Illness  50 y.o.female presents for health maintenance, Adult Female:   General Health: The patient's health is described as good. She denies vision problems. She denies hearing loss. Immunizations status reviewed and plan to update today if needed. tinnitis.  Chronic medical problems:  Vertigo improved with vesitbular therapy.  Feels like has sunburnon left side of face scalp. Shooting pain from scalp to ear; pain recurrent may last a day or week.  Intermittent cough; uses inhaler prn. Needs refill.   Has had low B12 in past; taking supplement.  Social History/Lifestyle:   Social History     Socioeconomic History   • Marital status:    Tobacco Use   • Smoking status: Former Smoker     Packs/day: 1.00     Years: 3.00     Pack years: 3.00     Types: Cigarettes     Quit date: 2009     Years since quittin.4   • Smokeless tobacco: Never Used   Substance and Sexual Activity   • Alcohol use: Yes     Comment: social   • Drug use: No   • Sexual activity: Defer   Lifestyle   • Physical activity     Days per week: 0 days     Minutes per session: Not on file   • Stress: Very much   Social History Narrative    Caffeine: 2 serving per day.     Reproductive health:  She reports normal menses. No vaginal pain, vaginal drainage, pelvic pain, flank pain, or dysuria.  No breast complaints.  Screening:   Health Maintenance reviewed.  Health Maintenance   Topic Date Due   • INFLUENZA VACCINE  2021 (Originally 2020)   • ZOSTER VACCINE (1 of 2) 2022 (Originally 2018)   • PAP SMEAR  2021   • MAMMOGRAM  2021   • ANNUAL PHYSICAL  2022   • TDAP/TD VACCINES (2 - Td) 2022   • COLONOSCOPY  12/15/2027   • HEPATITIS C SCREENING  Completed   • Hepatitis B  Aged Out   • Pneumococcal Vaccine 0-64  Aged Out   • MENINGOCOCCAL VACCINE  Aged Out          Review of Systems   Constitutional: Negative for chills and  fatigue.   HENT: Negative for congestion, postnasal drip and rhinorrhea.    Eyes: Negative for blurred vision and visual disturbance.   Respiratory: Negative for cough and shortness of breath.    Cardiovascular: Negative for chest pain, palpitations and leg swelling.   Gastrointestinal: Negative for constipation, diarrhea, nausea and vomiting.   Genitourinary: Negative for difficulty urinating.   Musculoskeletal: Negative for arthralgias.   Skin: Negative for rash.   Neurological: Positive for light-headedness. Negative for dizziness, numbness and headache.        Nerve pain in face   Psychiatric/Behavioral: Negative for depressed mood. The patient is not nervous/anxious.          [facility]  The following portions of the patient's history were reviewed and updated as appropriate: allergies, current medications, past family history, past medical history, past social history, past surgical history and problem list.     Past Medical History:   Diagnosis Date   • Abdominal pain    • Abdominal pain, RUQ     Check cmp. refer to [facility] u/s and gen surg eval d/t cholelithiasis noted on CT of abd/pelvis donein ER 1/16   • Acute bronchitis     Take cefdinir and medrol dose pack as directed. Use otc mucinex. Continue to rest and push fluids. Call or rtc if no better. Gave phenergan w/ codeine cough syrup 5 ml po prn #120ml, no RF per Dr. Cesar   • Acute pharyngitis     Check for monod/t exposure/ Pt will restart flovent as used for esophagitis. If no improvement take the medrol dose pack she was given at least visit   • Acute sinusitis    • Acute upper respiratory infection    • Anxiety    • Body aches    • Cholelithiasis     Reviewed CT of adb/pelvis from ER 1/16 showed cholelithiasis. Will refer for gen surgery eval and [facility] u/s   • Chronic pain disorder    • Cough    • Eosinophilic esophagitis    • Extremity pain    • Gestational diabetes    • Headache    • Influenza    • Labyrinthitis    • Low back pain    • Lumbar strain    •  Lumbosacral disc disease    • Neck pain    • Right hip pain    • Shingles    • Strain of thoracic region    • Viral infection    • Viral pharyngitis       Past Surgical History:   Procedure Laterality Date   • BREAST BIOPSY Left 10/2018    Benign STEREO    •  SECTION      97, 00, 04, 12   • DILATATION AND CURETTAGE     • ENDOSCOPY     • OOPHORECTOMY Right 2017   • WISDOM TOOTH EXTRACTION        Allergies   Allergen Reactions   • Maxalt [Rizatriptan] Anaphylaxis   • Sulfa Antibiotics Rash   • Sumatriptan Anaphylaxis   • Zolmitriptan Anaphylaxis   • Labetalol Rash      Family History   Problem Relation Age of Onset   • Arthritis Mother    • Hypertension Mother    • Thyroid disease Mother    • Arthritis Father    • Hypertension Father    • Heart disease Father    • Heart attack Other    • Arthritis Other    • Hypertension Other    • Migraines Other    • Stroke Other    • Breast cancer Other 31   • Ovarian cancer Cousin 42   • Heart failure Paternal Grandmother    • Endometrial cancer Neg Hx    • Colon cancer Neg Hx             Current Outpatient Medications:   •  albuterol sulfate  (90 Base) MCG/ACT inhaler, Inhale 2 puffs Every 4 (Four) Hours As Needed for Wheezing., Disp: 1 inhaler, Rfl: 5  •  ALPRAZolam (XANAX) 0.25 MG tablet, Take 1 tablet by mouth At Night As Needed for Anxiety or Sleep. One po bid prn, Disp: 30 tablet, Rfl: 0  •  aspirin  MG tablet, Take 325 mg by mouth Every 6 (Six) Hours As Needed., Disp: , Rfl:   •  buPROPion XL (Wellbutrin XL) 150 MG 24 hr tablet, Take 150 mg by mouth Daily., Disp: , Rfl:   •  cetirizine (zyrTEC) 10 MG tablet, Take 10 mg by mouth Daily., Disp: , Rfl:   •  Ergocalciferol (VITAMIN D2 PO), Take 1 capsule by mouth., Disp: , Rfl:   •  fluticasone (FLONASE) 50 MCG/ACT nasal spray, 2 sprays into the nostril(s) as directed by provider., Disp: , Rfl:   •  hydroCHLOROthiazide (HYDRODIURIL) 12.5 MG tablet, TAKE 1 TABLET BY MOUTH DAILY,  "Disp: 30 tablet, Rfl: 0  •  L-methylfolate Calcium 15 MG tablet, Take 1 tablet by mouth Daily., Disp: , Rfl:   •  meclizine (ANTIVERT) 25 MG tablet, Take 1 tablet by mouth 3 (Three) Times a Day As Needed for Dizziness., Disp: 20 tablet, Rfl: 0  •  meloxicam (MOBIC) 15 MG tablet, 1 PO Daily with food., Disp: 60 tablet, Rfl: 0  •  methocarbamol (ROBAXIN) 750 MG tablet, Take 1 tablet by mouth 4 (Four) Times a Day As Needed for Muscle Spasms., Disp: 90 tablet, Rfl: 0  •  Multiple Vitamins-Minerals (MULTIVITAMIN ADULT PO), Take  by mouth., Disp: , Rfl:   •  promethazine (PHENERGAN) 25 MG tablet, Take 1 tablet by mouth Every 8 (Eight) Hours As Needed for Nausea or Vomiting., Disp: 21 tablet, Rfl: 2  •  propranolol (INDERAL) 10 MG tablet, Take 1 tablet by mouth 2 (Two) Times a Day., Disp: 60 tablet, Rfl: 11  •  vitamin B-12 (CYANOCOBALAMIN) 1000 MCG tablet, Take 1,000 mcg by mouth Daily., Disp: , Rfl:     VITALS:  /90   Pulse 66   Temp 97.7 °F (36.5 °C)   Ht 162.6 cm (64\")   Wt 83.9 kg (185 lb)   LMP  (LMP Unknown)   SpO2 99%   Breastfeeding No   BMI 31.76 kg/m²     Physical Exam  Vitals signs reviewed.   Constitutional:       General: She is not in acute distress.     Appearance: She is well-developed. She is not ill-appearing or diaphoretic.   HENT:      Head: Normocephalic.      Right Ear: Tympanic membrane, ear canal and external ear normal.      Left Ear: Tympanic membrane, ear canal and external ear normal.      Nose: Nose normal.      Mouth/Throat:      Mouth: Mucous membranes are moist.      Pharynx: Oropharynx is clear. No oropharyngeal exudate or posterior oropharyngeal erythema.   Eyes:      General: Lids are normal.         Right eye: No discharge.         Left eye: No discharge.      Extraocular Movements: Extraocular movements intact.      Conjunctiva/sclera: Conjunctivae normal.      Pupils: Pupils are equal, round, and reactive to light.   Neck:      Musculoskeletal: Normal range of motion and " neck supple.      Thyroid: No thyromegaly.      Vascular: No JVD.   Cardiovascular:      Rate and Rhythm: Normal rate and regular rhythm.      Pulses: Normal pulses.      Heart sounds: Normal heart sounds.      Comments: No edema  Pulmonary:      Effort: Pulmonary effort is normal. No respiratory distress.      Breath sounds: Normal breath sounds.   Abdominal:      General: Bowel sounds are normal. There is no distension or abdominal bruit.      Palpations: Abdomen is soft. There is no hepatomegaly, splenomegaly or mass.      Tenderness: There is no abdominal tenderness. There is no right CVA tenderness or left CVA tenderness.      Hernia: No hernia is present.   Musculoskeletal: Normal range of motion.      Comments: All major joints with normal ROM   Lymphadenopathy:      Head:      Right side of head: No submental, submandibular or tonsillar adenopathy.      Left side of head: No submental, submandibular or tonsillar adenopathy.      Cervical: No cervical adenopathy.   Skin:     General: Skin is warm and dry.      Capillary Refill: Capillary refill takes less than 2 seconds.      Findings: No rash.   Neurological:      General: No focal deficit present.      Mental Status: She is alert and oriented to person, place, and time.      Motor: Motor function is intact.      Coordination: Coordination is intact. Coordination normal.      Gait: Gait normal.      Comments: Decreased sensation left side face sharp soft in trigeminal nerve area.   Psychiatric:         Mood and Affect: Mood normal.         Speech: Speech normal.         Behavior: Behavior normal.         LABS  pending    ASSESSMENT/PLAN  Diagnoses and all orders for this visit:    1. Annual physical exam (Primary)  -     CBC & Differential; Future  -     Comprehensive Metabolic Panel; Future  -     Lipid Panel; Future  -     Vitamin D 25 Hydroxy; Future    2. Encounter for screening mammogram for malignant neoplasm of breast  -     Mammo Screening Digital  Tomosynthesis Bilateral With CAD; Future    3. Essential hypertension  Comments:  controlled  Orders:  -     hydroCHLOROthiazide (HYDRODIURIL) 12.5 MG tablet; Take 1 tablet by mouth Daily.  Dispense: 30 tablet; Refill: 11    4. Cough  Comments:  intermittent  Orders:  -     albuterol sulfate  (90 Base) MCG/ACT inhaler; Inhale 2 puffs Every 4 (Four) Hours As Needed for Wheezing or Shortness of Air.  Dispense: 18 g; Refill: 11    5. Trigeminal neuralgia  -     Ambulatory Referral to Neurology    6. B12 deficiency  -     Vitamin B12; Future        Nutrition and activity goals reviewed including: mainly water to drink, limit white flour/processed sugar; increase high protein, high fiber carbs, good breakfast, working toward 150 mins cardio per week, resistance training 2x/week.    The patient is here for a health maintenance visit. Screening lab work is ordered.  Immunizations are reported as current.  Advice and education is given regarding nutrition, aerobic exercise, routine dental evaluations, routine eye exams, reproductive health, cardiovascular risk reduction.  Further recommendations after lab evaluation.  Annual wellness evaluations recommended.     I discussed the patients findings and my recommendations with patient.  Patient was encouraged to keep me informed of any acute changes or any new concerning symptoms.    Patient voiced understanding of all instructions and denied further questions.      FOLLOW-UP  Return in about 1 year (around 1/18/2022), or if symptoms worsen or fail to improve, for Annual.    Electronically signed by:    CLAY Bagley  01/18/2021    EMR Dragon/Transcription Disclaimer:  Much of this encounter note is an electronic transcription/translation of spoken language to printed text.  The electronic translation of spoken language may permit erroneous, or at times, nonsensical words or phrases to be inadvertently transcribed.  Although I have reviewed the note for such  errors, some may still exist

## 2021-01-19 ENCOUNTER — APPOINTMENT (OUTPATIENT)
Dept: PHYSICAL THERAPY | Facility: HOSPITAL | Age: 51
End: 2021-01-19

## 2021-01-21 ENCOUNTER — OFFICE VISIT (OUTPATIENT)
Dept: ORTHOPEDIC SURGERY | Facility: CLINIC | Age: 51
End: 2021-01-21

## 2021-01-21 VITALS — HEIGHT: 64 IN | BODY MASS INDEX: 31.58 KG/M2 | OXYGEN SATURATION: 98 % | WEIGHT: 184.97 LBS | HEART RATE: 93 BPM

## 2021-01-21 DIAGNOSIS — M25.561 RIGHT KNEE PAIN, UNSPECIFIED CHRONICITY: ICD-10-CM

## 2021-01-21 DIAGNOSIS — M17.11 PRIMARY OSTEOARTHRITIS OF RIGHT KNEE: Primary | ICD-10-CM

## 2021-01-21 PROCEDURE — 99214 OFFICE O/P EST MOD 30 MIN: CPT | Performed by: ORTHOPAEDIC SURGERY

## 2021-02-16 ENCOUNTER — DOCUMENTATION (OUTPATIENT)
Dept: PHYSICAL THERAPY | Facility: HOSPITAL | Age: 51
End: 2021-02-16

## 2021-02-16 DIAGNOSIS — H83.2X3 VESTIBULAR HYPOFUNCTION OF BOTH EARS: ICD-10-CM

## 2021-02-16 DIAGNOSIS — H81.11 BENIGN PAROXYSMAL POSITIONAL VERTIGO OF RIGHT EAR: Primary | ICD-10-CM

## 2021-02-16 NOTE — THERAPY DISCHARGE NOTE
Outpatient Physical Therapy Discharge Summary         Patient Name: Gino Maguire  : 1970  MRN: [id number]    Today's Date: 2021    Visit Dx:    ICD-10-CM ICD-9-CM   1. Benign paroxysmal positional vertigo of right ear  H81.11 386.11   2. Vestibular hypofunction of both ears  H83.2X3 386.50     Client was treated for 2 visits for BPPV and vestibular hypofunction. Last visit on 21 she had signs that BPPV resolved. She still had some signs of vestibular hypofunction and demonstrated good understanding of exercises to continue. Prognosis at MT is good.     Shay Taylor, PT  2021

## 2021-03-02 ENCOUNTER — TELEPHONE (OUTPATIENT)
Dept: ORTHOPEDIC SURGERY | Facility: CLINIC | Age: 51
End: 2021-03-02

## 2021-03-02 NOTE — TELEPHONE ENCOUNTER
PATIENT'S LAST APPT WITH DR. ORDOÑEZ WAS 1-, SHE STATED THAT SHE WAS TO GET A CALL BACK CONFIRMING IF INSURANCE WOULD  COVER ORTHOVISC INJECTION IN RT KNEE.  SHE WOULD LIKE A CALL BACK TO DISCUSS.    BRENDA LANIDN MAY BE REACHED AT:   [phone]

## 2021-03-11 ENCOUNTER — CLINICAL SUPPORT (OUTPATIENT)
Dept: ORTHOPEDIC SURGERY | Facility: CLINIC | Age: 51
End: 2021-03-11

## 2021-03-11 DIAGNOSIS — M17.11 PRIMARY OSTEOARTHRITIS OF RIGHT KNEE: Primary | ICD-10-CM

## 2021-03-11 PROCEDURE — 20610 DRAIN/INJ JOINT/BURSA W/O US: CPT | Performed by: ORTHOPAEDIC SURGERY

## 2021-03-11 NOTE — PROGRESS NOTES
Procedure   Large Joint Arthrocentesis: R knee  Date/Time: 3/11/2021 7:51 AM  Consent given by: patient  Site marked: site marked  Timeout: Immediately prior to procedure a time out was called to verify the correct patient, procedure, equipment, support staff and site/side marked as required   Supporting Documentation  Indications: pain   Procedure Details  Location: knee - R knee  Preparation: Patient was prepped and draped in the usual sterile fashion  Needle size: 23 G  Approach: anterolateral  Medications administered: 30 mg Hyaluronan 30 MG/2ML  Patient tolerance: patient tolerated the procedure well with no immediate complications

## 2021-03-11 NOTE — PROGRESS NOTES
Patient returns for initiation of right knee Orthovisc injections.  Denies interval changes since her last visit.    Procedure Note:  I discussed with the patient the potential benefits of performing a therapeutic injection of the right knee as well as potential risks including but not limited to infection, swelling, pain, bleeding, bruising, nerve/vessel damage, pseudoseptic reaction, and worsening joint pain. After informed consent and verifying correct patient, procedure site, and type of procedure, the area was prepped with alcohol, ethyl chloride was used to numb the skin. Via the superolateral approach, the viscosupplementation syringe contents were injected into the right knee. The patient tolerated the procedure well. There were no complications. A sterile dressing was placed over the injection site.    Follow up 1 week.

## 2021-03-16 ENCOUNTER — HOSPITAL ENCOUNTER (OUTPATIENT)
Dept: MAMMOGRAPHY | Facility: HOSPITAL | Age: 51
Discharge: HOME | End: 2021-03-16
Admitting: NURSE PRACTITIONER

## 2021-03-16 DIAGNOSIS — Z12.31 ENCOUNTER FOR SCREENING MAMMOGRAM FOR MALIGNANT NEOPLASM OF BREAST: ICD-10-CM

## 2021-03-16 PROCEDURE — 77063 BREAST TOMOSYNTHESIS BI: CPT | Performed by: RADIOLOGY

## 2021-03-16 PROCEDURE — 77063 BREAST TOMOSYNTHESIS BI: CPT

## 2021-03-16 PROCEDURE — 77067 SCR MAMMO BI INCL CAD: CPT

## 2021-03-16 PROCEDURE — 77067 SCR MAMMO BI INCL CAD: CPT | Performed by: RADIOLOGY

## 2021-03-18 ENCOUNTER — CLINICAL SUPPORT (OUTPATIENT)
Dept: ORTHOPEDIC SURGERY | Facility: CLINIC | Age: 51
End: 2021-03-18

## 2021-03-18 DIAGNOSIS — M17.11 PRIMARY OSTEOARTHRITIS OF RIGHT KNEE: Primary | ICD-10-CM

## 2021-03-18 PROCEDURE — 20610 DRAIN/INJ JOINT/BURSA W/O US: CPT | Performed by: ORTHOPAEDIC SURGERY

## 2021-03-18 RX ORDER — ERGOCALCIFEROL 1.25 MG/1
50000 CAPSULE ORAL
COMMUNITY
Start: 2021-01-14

## 2021-03-18 NOTE — PROGRESS NOTES
Procedure   Large Joint Arthrocentesis: R knee  Date/Time: 3/18/2021 7:54 AM  Consent given by: patient  Site marked: site marked  Timeout: Immediately prior to procedure a time out was called to verify the correct patient, procedure, equipment, support staff and site/side marked as required   Supporting Documentation  Indications: pain   Procedure Details  Location: knee - R knee  Preparation: Patient was prepped and draped in the usual sterile fashion  Needle size: 23 G  Approach: anterolateral  Medications administered: 30 mg Hyaluronan 30 MG/2ML  Patient tolerance: patient tolerated the procedure well with no immediate complications

## 2021-03-18 NOTE — PROGRESS NOTES
Patient returns for second Orthovisc injection the right knee.  Denies complications with the initial injection.  Overall her pain is improved.    Procedure Note:  I discussed with the patient the potential benefits of performing a therapeutic injection of the right knee as well as potential risks including but not limited to infection, swelling, pain, bleeding, bruising, nerve/vessel damage, pseudoseptic reaction, and worsening joint pain. After informed consent and verifying correct patient, procedure site, and type of procedure, the area was prepped with alcohol, ethyl chloride was used to numb the skin. Via the superolateral approach, the viscosupplementation syringe contents were injected into the right knee. The patient tolerated the procedure well. There were no complications. A sterile dressing was placed over the injection site.    Follow up 1 week.

## 2021-03-25 ENCOUNTER — CLINICAL SUPPORT (OUTPATIENT)
Dept: ORTHOPEDIC SURGERY | Facility: CLINIC | Age: 51
End: 2021-03-25

## 2021-03-25 DIAGNOSIS — M17.11 PRIMARY OSTEOARTHRITIS OF RIGHT KNEE: Primary | ICD-10-CM

## 2021-03-25 PROCEDURE — 20610 DRAIN/INJ JOINT/BURSA W/O US: CPT | Performed by: ORTHOPAEDIC SURGERY

## 2021-03-25 NOTE — PROGRESS NOTES
Procedure   Large Joint Arthrocentesis: R knee  Date/Time: 3/25/2021 7:54 AM  Consent given by: patient  Site marked: site marked  Timeout: Immediately prior to procedure a time out was called to verify the correct patient, procedure, equipment, support staff and site/side marked as required   Supporting Documentation  Indications: pain   Procedure Details  Location: knee - R knee  Preparation: Patient was prepped and draped in the usual sterile fashion  Needle size: 23 G  Approach: anterolateral  Medications administered: 30 mg Hyaluronan 30 MG/2ML  Patient tolerance: patient tolerated the procedure well with no immediate complications

## 2021-03-25 NOTE — PROGRESS NOTES
Patient returns for third Orthovisc injection the right knee.  Denies complications from the prior injection.  Overall her pain has improved.    Procedure Note:  I discussed with the patient the potential benefits of performing a therapeutic injection of the right knee as well as potential risks including but not limited to infection, swelling, pain, bleeding, bruising, nerve/vessel damage, pseudoseptic reaction, and worsening joint pain. After informed consent and verifying correct patient, procedure site, and type of procedure, the area was prepped with alcohol, ethyl chloride was used to numb the skin. Via the superolateral approach, the viscosupplementation syringe contents were injected into the right knee. The patient tolerated the procedure well. There were no complications. A sterile dressing was placed over the injection site.    Follow up 6 months.

## 2021-04-21 DIAGNOSIS — M17.12 PRIMARY OSTEOARTHRITIS OF LEFT KNEE: ICD-10-CM

## 2021-04-22 RX ORDER — MELOXICAM 15 MG/1
TABLET ORAL
Qty: 60 TABLET | Refills: 0 | Status: SHIPPED | OUTPATIENT
Start: 2021-04-22 | End: 2021-08-04

## 2021-05-10 ENCOUNTER — OFFICE VISIT (OUTPATIENT)
Dept: NEUROLOGY | Facility: CLINIC | Age: 51
End: 2021-05-10

## 2021-05-10 VITALS
SYSTOLIC BLOOD PRESSURE: 118 MMHG | BODY MASS INDEX: 30.3 KG/M2 | TEMPERATURE: 97.1 F | OXYGEN SATURATION: 98 % | HEIGHT: 63 IN | DIASTOLIC BLOOD PRESSURE: 72 MMHG | WEIGHT: 171 LBS | HEART RATE: 79 BPM

## 2021-05-10 DIAGNOSIS — M54.2 NECK PAIN: ICD-10-CM

## 2021-05-10 DIAGNOSIS — G56.01 CARPAL TUNNEL SYNDROME OF RIGHT WRIST: ICD-10-CM

## 2021-05-10 DIAGNOSIS — M54.12 CERVICAL RADICULOPATHY AT C6: ICD-10-CM

## 2021-05-10 DIAGNOSIS — R20.8 DYSESTHESIA: Primary | ICD-10-CM

## 2021-05-10 PROCEDURE — 99204 OFFICE O/P NEW MOD 45 MIN: CPT | Performed by: PSYCHIATRY & NEUROLOGY

## 2021-05-10 RX ORDER — MULTIVIT WITH MINERALS/LUTEIN
250 TABLET ORAL DAILY
COMMUNITY

## 2021-05-10 RX ORDER — UBIDECARENONE 100 MG
100 CAPSULE ORAL DAILY
COMMUNITY
End: 2022-08-08

## 2021-05-10 NOTE — PROGRESS NOTES
Subjective:    CC: Gino Maguire is seen today in consultation at the request of CLAY Munoz for Trigeminal neuralgia       HPI:  Patient is a 51-year-old female referred to the clinic for evaluation of possible trigeminal neuralgia.  She reports that she started having symptoms about a year ago where she started noticing intermittent episodes of pain, burning sharp pains in left upper scalp, left face lasting for 2 to 3 days and varying intensity.  She denies any specific triggers but when it happens usually last for 24 to 48 hours.  She reports that showing or brushing teeth on the left side does not really trigger the symptoms.  She reports that intermittently, she has noticed symptoms on the right side but mostly it is on the left.  In addition to this, she is also reporting neck pain with pain shooting down to right hand digits 1 2 and 3.  Sometimes she also experiences tingling and numbness in the right hand digits 1, 2 and 3 which is worse at night.  The symptoms are also brought on sometimes by sitting in certain position with neck bent forward and sideways.      The following portions of the patient's history were reviewed today and updated as of 05/10/2021  : allergies, social history and problem list.  This document will be scanned to patient's chart.      Current Outpatient Medications:   •  albuterol sulfate  (90 Base) MCG/ACT inhaler, Inhale 2 puffs Every 4 (Four) Hours As Needed for Wheezing or Shortness of Air., Disp: 18 g, Rfl: 11  •  ALPRAZolam (XANAX) 0.25 MG tablet, Take 1 tablet by mouth At Night As Needed for Anxiety or Sleep. One po bid prn, Disp: 30 tablet, Rfl: 0  •  aspirin  MG tablet, Take 325 mg by mouth Every 6 (Six) Hours As Needed., Disp: , Rfl:   •  buPROPion XL (Wellbutrin XL) 150 MG 24 hr tablet, Take 150 mg by mouth Daily., Disp: , Rfl:   •  cetirizine (zyrTEC) 10 MG tablet, Take 10 mg by mouth Daily., Disp: , Rfl:   •  coenzyme Q10 100 MG capsule,  Take 100 mg by mouth Daily., Disp: , Rfl:   •  Ergocalciferol (VITAMIN D2 PO), Take 1 capsule by mouth., Disp: , Rfl:   •  fluticasone (FLONASE) 50 MCG/ACT nasal spray, 2 sprays into the nostril(s) as directed by provider., Disp: , Rfl:   •  hydroCHLOROthiazide (HYDRODIURIL) 12.5 MG tablet, Take 1 tablet by mouth Daily., Disp: 30 tablet, Rfl: 11  •  L-methylfolate Calcium 15 MG tablet, Take 1 tablet by mouth Daily., Disp: , Rfl:   •  meclizine (ANTIVERT) 25 MG tablet, Take 1 tablet by mouth 3 (Three) Times a Day As Needed for Dizziness., Disp: 20 tablet, Rfl: 0  •  meloxicam (MOBIC) 15 MG tablet, 1 PO Daily with food., Disp: 60 tablet, Rfl: 0  •  methocarbamol (ROBAXIN) 750 MG tablet, Take 1 tablet by mouth 4 (Four) Times a Day As Needed for Muscle Spasms., Disp: 90 tablet, Rfl: 0  •  Omega 3-6-9 Fatty Acids (OMEGA 3-6-9 COMPLEX PO), Take  by mouth., Disp: , Rfl:   •  promethazine (PHENERGAN) 25 MG tablet, Take 1 tablet by mouth Every 8 (Eight) Hours As Needed for Nausea or Vomiting., Disp: 21 tablet, Rfl: 2  •  propranolol (INDERAL) 10 MG tablet, Take 1 tablet by mouth 2 (Two) Times a Day., Disp: 60 tablet, Rfl: 11  •  vitamin B-12 (CYANOCOBALAMIN) 1000 MCG tablet, Take 1,000 mcg by mouth Daily., Disp: , Rfl:   •  vitamin C (ASCORBIC ACID) 250 MG tablet, Take 250 mg by mouth Daily., Disp: , Rfl:   •  vitamin D (ERGOCALCIFEROL) 1.25 MG (28521 UT) capsule capsule, Take 50,000 Units by mouth Every 7 (Seven) Days., Disp: , Rfl:   •  Zinc Sulfate (ZINC 15 PO), Take  by mouth., Disp: , Rfl:    Past Medical History:   Diagnosis Date   • Abdominal pain    • Abdominal pain, RUQ     Check cmp. refer to [facility] u/s and gen surg eval d/t cholelithiasis noted on CT of abd/pelvis donein ER 1/16   • Acute bronchitis     Take cefdinir and medrol dose pack as directed. Use otc mucinex. Continue to rest and push fluids. Call or rtc if no better. Gave phenergan w/ codeine cough syrup 5 ml po prn #120ml, no RF per Dr. Cesar   • Acute  pharyngitis     Check for monod/t exposure/ Pt will restart flovent as used for esophagitis. If no improvement take the medrol dose pack she was given at least visit   • Acute sinusitis    • Acute upper respiratory infection    • Anxiety    • Body aches    • Cholelithiasis     Reviewed CT of adb/pelvis from ER  showed cholelithiasis. Will refer for gen surgery eval and [facility] u/s   • Chronic pain disorder    • Cough    • Eosinophilic esophagitis    • Extremity pain    • Gestational diabetes    • Headache    • Influenza    • Labyrinthitis    • Low back pain    • Lumbar strain    • Lumbosacral disc disease    • Neck pain    • Right hip pain    • Shingles    • Strain of thoracic region    • Viral infection    • Viral pharyngitis       Past Surgical History:   Procedure Laterality Date   • BREAST BIOPSY Left 10/2018    Benign STEREO    •  SECTION      97, 00, 04, 12   • DILATATION AND CURETTAGE     • ENDOSCOPY     • OOPHORECTOMY Right 2017   • WISDOM TOOTH EXTRACTION        Family History   Problem Relation Age of Onset   • Arthritis Mother    • Hypertension Mother    • Thyroid disease Mother    • Arthritis Father    • Hypertension Father    • Heart disease Father    • Heart attack Other    • Arthritis Other    • Hypertension Other    • Migraines Other    • Stroke Other    • Breast cancer Other 31   • Ovarian cancer Cousin 42   • Heart failure Paternal Grandmother    • Endometrial cancer Neg Hx    • Colon cancer Neg Hx       Review of Systems   Constitutional: Negative.    HENT: Negative.    Eyes: Negative.    Respiratory: Negative.    Cardiovascular: Positive for leg swelling.   Gastrointestinal: Negative.    Endocrine: Negative.    Genitourinary: Negative.    Musculoskeletal: Positive for back pain and neck pain.   Skin: Negative.    Allergic/Immunologic: Negative.    Neurological: Positive for numbness and headache.   Hematological: Negative.    Psychiatric/Behavioral: Positive for  "sleep disturbance.       All other systems reviewed and are negative     Objective:    /72   Pulse 79   Temp 97.1 °F (36.2 °C)   Ht 160 cm (63\")   Wt 77.6 kg (171 lb)   SpO2 98%   BMI 30.29 kg/m²     Neurology Exam:    General apperance: NAD.     Mental status: Alert, awake and oriented to time place and person.    Recent and Remote memory: Can recall 3/3 objects at 5 minutes. Can recall historical events.     Attention span and Concentration: Serial 7s: Normal.     Fund of knowledge:  Normal.     Language and Speech: No aphasia or dysarthria.    Naming , Repitition and Comprehension:  Can name objects, repeat a sentence and follow commands. Speech is clear and fluent with good repetition, comprehension, and naming.    Cranial Nerves:   CN II: Visual fields are full. Intact. Fundi - Normal, No papillederma, Pupils - DEVAUGHN  CN III, IV and VI: Extraocular movements are intact. Normal saccades.   CN V: Facial sensation is intact.   CN VII: Muscles of facial expression reveal no asymmetry. Intact.   CN VIII: Hearing is intact. Whispered voice intact.   CN IX and X: Palate elevates symmetrically. Intact  CN XI: Shoulder shrug is intact.   CN XII: Tongue is midline without evidence of atrophy or fasciculation.     Motor:  Right UE muscle strength 5/5. Normal tone.     Left UE muscle strength 5/5. Normal tone.      Right LE muscle strength5/5. Normal tone.     Left LE muscle strength 5/5. Normal tone.      Sensory: Normal light touch, vibration and pinprick sensation bilaterally.    DTRs: 2+ bilaterally in upper and lower extremities.    Babinski: Negative bilaterally.    Co-ordination: Normal finger-to-nose, heel to shin B/L.    Rhomberg: Negative.    Gait: Normal.    Cardiovascular: Regular rate and rhythm without murmur, gallop or rub.    Ophthalmoscopic exam: Normal fundi, no papilledema.    Assessment and Plan:  1. Dysesthesia  2. Neck pain  3. Cervical radiculopathy at C6  4. Carpal tunnel syndrome of " right wrist  - ?  Trigeminal neuralgia on the left.  I will be obtaining MRI brain for further evaluation.  In addition the symptoms are neck pain with pain shooting down to the right hand digits 1 2 and 3 is suggestive of cervical radiculopathy at C6 versus carpal tunnel syndrome.  I will schedule her for MRI of cervical spine as well as EMG/nerve conduction study for further evaluation.  No medication for now and once the results of the tests are back, treatment options will be discussed with her.  I will plan to see her back in clinic in 6 weeks for follow-up.    - MRI Brain Without Contrast; Future  - MRI Cervical Spine With & Without Contrast; Future  - EMG & Nerve Conduction Test; Future    Return in about 8 weeks (around 7/5/2021).     Ángel Coronado MD

## 2021-06-11 ENCOUNTER — HOSPITAL ENCOUNTER (OUTPATIENT)
Dept: MRI IMAGING | Facility: HOSPITAL | Age: 51
Discharge: HOME | End: 2021-06-11

## 2021-06-11 ENCOUNTER — TELEPHONE (OUTPATIENT)
Dept: NEUROLOGY | Facility: CLINIC | Age: 51
End: 2021-06-11

## 2021-06-11 ENCOUNTER — HOSPITAL ENCOUNTER (OUTPATIENT)
Dept: NEUROLOGY | Facility: HOSPITAL | Age: 51
Discharge: HOME | End: 2021-06-11

## 2021-06-11 DIAGNOSIS — M54.12 CERVICAL RADICULOPATHY AT C6: ICD-10-CM

## 2021-06-11 DIAGNOSIS — G56.01 CARPAL TUNNEL SYNDROME OF RIGHT WRIST: ICD-10-CM

## 2021-06-11 DIAGNOSIS — M54.2 NECK PAIN: ICD-10-CM

## 2021-06-11 DIAGNOSIS — R20.8 DYSESTHESIA: ICD-10-CM

## 2021-06-11 PROCEDURE — 72141 MRI NECK SPINE W/O DYE: CPT

## 2021-06-11 PROCEDURE — 95886 MUSC TEST DONE W/N TEST COMP: CPT

## 2021-06-11 PROCEDURE — 70551 MRI BRAIN STEM W/O DYE: CPT

## 2021-06-11 PROCEDURE — 95910 NRV CNDJ TEST 7-8 STUDIES: CPT

## 2021-06-11 NOTE — TELEPHONE ENCOUNTER
Provider: DR ANTOINE    Caller: JO AREVALO      Reason for Call:  MRI CALLED STATING PATIENT IS ALLERGIC TO METAL AND IS THERE FOR HER MRI.  ONE OF HER ORDERS IS FOR MRI OF C SPINE W/WO CONTRAST.  I TRIED TO CALL OFFICE SINCE PATIENT WAS THERE FOR MRI, BUT NO ANSWER.   MRI DECIDED TO GO AHEAD AND DO THE TEST WITHOUT THE CONTRAST.

## 2021-06-14 DIAGNOSIS — M54.2 NECK PAIN: Primary | ICD-10-CM

## 2021-06-14 DIAGNOSIS — M54.12 CERVICAL RADICULOPATHY AT C6: ICD-10-CM

## 2021-06-21 ENCOUNTER — OFFICE VISIT (OUTPATIENT)
Dept: NEUROLOGY | Facility: CLINIC | Age: 51
End: 2021-06-21

## 2021-06-21 VITALS
HEART RATE: 80 BPM | DIASTOLIC BLOOD PRESSURE: 74 MMHG | SYSTOLIC BLOOD PRESSURE: 120 MMHG | HEIGHT: 63 IN | OXYGEN SATURATION: 96 % | BODY MASS INDEX: 29.41 KG/M2 | WEIGHT: 166 LBS

## 2021-06-21 DIAGNOSIS — M54.12 CERVICAL RADICULOPATHY AT C6: ICD-10-CM

## 2021-06-21 DIAGNOSIS — M54.2 NECK PAIN: Primary | ICD-10-CM

## 2021-06-21 PROCEDURE — 99214 OFFICE O/P EST MOD 30 MIN: CPT | Performed by: PSYCHIATRY & NEUROLOGY

## 2021-06-21 NOTE — PROGRESS NOTES
Subjective:    CC: Gino Maguire is in clinic today for follow up for      HPI:  Initial visit: 5/10/21 :patient is a 51-year-old female referred to the clinic for evaluation of possible trigeminal neuralgia.  She reports that she started having symptoms about a year ago where she started noticing intermittent episodes of pain, burning sharp pains in left upper scalp, left face lasting for 2 to 3 days and varying intensity.  She denies any specific triggers but when it happens usually last for 24 to 48 hours.  She reports that showing or brushing teeth on the left side does not really trigger the symptoms.  She reports that intermittently, she has noticed symptoms on the right side but mostly it is on the left.  In addition to this, she is also reporting neck pain with pain shooting down to right hand digits 1 2 and 3.  Sometimes she also experiences tingling and numbness in the right hand digits 1, 2 and 3 which is worse at night.  The symptoms are also brought on sometimes by sitting in certain position with neck bent forward and sideways.    Follow-up: She is in clinic for regular follow-up.  Since her last visit in May, she has now completed both MRI brain and MRI of cervical spine for further evaluation.  MRI brain was essentially normal with normal variant of empty sella.  MRI of cervical spine showed broad-based disc protrusion at C5-C6 level with mild bilateral neuroforaminal stenosis.  EMG/nerve conduction study was completed which was essentially normal with no evidence of carpal tunnel syndrome or ulnar neuropathy.  Following this, she was referred to physical therapy which she yet has to start.  However she reports that overall symptoms have reduced on its own.    The following portions of the patient's history were reviewed and updated as of 06/21/2021: allergies, social history and problem list.       Current Outpatient Medications:   •  albuterol sulfate  (90 Base) MCG/ACT inhaler, Inhale  2 puffs Every 4 (Four) Hours As Needed for Wheezing or Shortness of Air., Disp: 18 g, Rfl: 11  •  ALPRAZolam (XANAX) 0.25 MG tablet, Take 1 tablet by mouth At Night As Needed for Anxiety or Sleep. One po bid prn, Disp: 30 tablet, Rfl: 0  •  aspirin  MG tablet, Take 325 mg by mouth Every 6 (Six) Hours As Needed., Disp: , Rfl:   •  buPROPion XL (Wellbutrin XL) 150 MG 24 hr tablet, Take 150 mg by mouth Daily., Disp: , Rfl:   •  cetirizine (zyrTEC) 10 MG tablet, Take 10 mg by mouth Daily., Disp: , Rfl:   •  coenzyme Q10 100 MG capsule, Take 100 mg by mouth Daily., Disp: , Rfl:   •  Ergocalciferol (VITAMIN D2 PO), Take 1 capsule by mouth., Disp: , Rfl:   •  fluticasone (FLONASE) 50 MCG/ACT nasal spray, 2 sprays into the nostril(s) as directed by provider., Disp: , Rfl:   •  hydroCHLOROthiazide (HYDRODIURIL) 12.5 MG tablet, Take 1 tablet by mouth Daily., Disp: 30 tablet, Rfl: 11  •  L-methylfolate Calcium 15 MG tablet, Take 1 tablet by mouth Daily., Disp: , Rfl:   •  meloxicam (MOBIC) 15 MG tablet, 1 PO Daily with food., Disp: 60 tablet, Rfl: 0  •  Omega 3-6-9 Fatty Acids (OMEGA 3-6-9 COMPLEX PO), Take  by mouth., Disp: , Rfl:   •  promethazine (PHENERGAN) 25 MG tablet, Take 1 tablet by mouth Every 8 (Eight) Hours As Needed for Nausea or Vomiting., Disp: 21 tablet, Rfl: 2  •  propranolol (INDERAL) 10 MG tablet, Take 1 tablet by mouth 2 (Two) Times a Day., Disp: 60 tablet, Rfl: 11  •  vitamin B-12 (CYANOCOBALAMIN) 1000 MCG tablet, Take 1,000 mcg by mouth Daily., Disp: , Rfl:   •  vitamin C (ASCORBIC ACID) 250 MG tablet, Take 250 mg by mouth Daily., Disp: , Rfl:   •  vitamin D (ERGOCALCIFEROL) 1.25 MG (09259 UT) capsule capsule, Take 50,000 Units by mouth Every 7 (Seven) Days., Disp: , Rfl:   •  Zinc Sulfate (ZINC 15 PO), Take  by mouth., Disp: , Rfl:    Past Medical History:   Diagnosis Date   • Abdominal pain    • Abdominal pain, RUQ     Check cmp. refer to [facility] u/s and gen surg eval d/t cholelithiasis noted on CT of  abd/pelvis donein ER    • Acute bronchitis     Take cefdinir and medrol dose pack as directed. Use otc mucinex. Continue to rest and push fluids. Call or rtc if no better. Gave phenergan w/ codeine cough syrup 5 ml po prn #120ml, no RF per Dr. Cesar   • Acute pharyngitis     Check for monod/t exposure/ Pt will restart flovent as used for esophagitis. If no improvement take the medrol dose pack she was given at least visit   • Acute sinusitis    • Acute upper respiratory infection    • Anxiety    • Body aches    • Cholelithiasis     Reviewed CT of adb/pelvis from ER  showed cholelithiasis. Will refer for gen surgery eval and [facility] u/s   • Chronic pain disorder    • Cough    • Eosinophilic esophagitis    • Extremity pain    • Gestational diabetes    • Headache    • Influenza    • Labyrinthitis    • Low back pain    • Lumbar strain    • Lumbosacral disc disease    • Neck pain    • Right hip pain    • Shingles    • Strain of thoracic region    • Viral infection    • Viral pharyngitis       Past Surgical History:   Procedure Laterality Date   • BREAST BIOPSY Left 10/2018    Benign STEREO    •  SECTION      97, 00, 04, 12   • DILATATION AND CURETTAGE     • ENDOSCOPY     • OOPHORECTOMY Right 2017   • WISDOM TOOTH EXTRACTION        Family History   Problem Relation Age of Onset   • Arthritis Mother    • Hypertension Mother    • Thyroid disease Mother    • Arthritis Father    • Hypertension Father    • Heart disease Father    • Heart attack Other    • Arthritis Other    • Hypertension Other    • Migraines Other    • Stroke Other    • Breast cancer Other 31   • Ovarian cancer Cousin 42   • Heart failure Paternal Grandmother    • Endometrial cancer Neg Hx    • Colon cancer Neg Hx         Review of Systems   Constitutional: Negative.    HENT: Negative.    Eyes: Negative.    Respiratory: Negative.    Cardiovascular: Negative.    Gastrointestinal: Negative.    Endocrine: Negative.   "  Genitourinary: Negative.    Musculoskeletal: Positive for neck pain.   Skin: Negative.    Allergic/Immunologic: Negative.    Neurological: Negative.    Hematological: Negative.    Psychiatric/Behavioral: Negative.      Objective:    /74   Pulse 80   Ht 160 cm (63\")   Wt 75.3 kg (166 lb)   SpO2 96%   BMI 29.41 kg/m²     Neurology Exam:  General apperance: NAD.     Mental status: Alert, awake and oriented to time place and person.    Recent and Remote memory: Can recall 3/3 objects at 5 minutes. Can recall historical events.     Attention span and Concentration: Serial 7s: Normal.     Fund of knowledge:  Normal.     Language and Speech: No aphasia or dysarthria.    Naming , Repitition and Comprehension:  Can name objects, repeat a sentence and follow commands. Speech is clear and fluent with good repetition, comprehension, and naming.    CN II to XII: Intact.    Opthalmoscopic Exam: No papilledema.    Motor:  Right UE muscle strength 5/5. Normal tone.     Left UE muscle strength 5/5. Normal tone.      Right LE muscle strength5/5. Normal tone.     Left LE muscle strength 5/5. Normal tone.      Sensory: Normal light touch, vibration and pinprick sensation bilaterally.    DTRs: 2+ bilaterally.    Babinski: Negative bilaterally.    Co-ordination: Normal finger-to-nose, heel to shin B/L.    Rhomberg: Negative.    Gait: Normal.    Cardiovascular: Regular rate and rhythm without murmur, gallop or rub.    Assessment and Plan:  1. Neck pain  2. Cervical radiculopathy at C6  MRI of cervical spine did show broad-based disc protrusion at C5-C6 level with mild neuroforaminal stenosis bilaterally.  This is certainly nonsurgical and hence physical therapy was recommended.  She will soon be starting the physical therapy.  Based on the response to physical therapy, further treatment/medication will be recommended.  If no response to PT then may consider starting her on low-dose Lyrica in future.  MRI brain was normal, " EMG/nerve conduction study was normal as well.  I will plan to see her back in 3 months for follow-up and    I spent 30 minutes face to face with the patient and spent more than 50% of this time  in management, instructions and education regarding above mentioned diagnosis and also on counseling and discussing about taking medication regularly, possible side effects with medication use, importance of good sleep hygiene, good hydration and regular exercise.    Return in about 3 months (around 9/21/2021).

## 2021-06-28 ENCOUNTER — OFFICE VISIT (OUTPATIENT)
Dept: ORTHOPEDIC SURGERY | Facility: CLINIC | Age: 51
End: 2021-06-28

## 2021-06-28 VITALS
HEART RATE: 71 BPM | WEIGHT: 166.01 LBS | HEIGHT: 63 IN | SYSTOLIC BLOOD PRESSURE: 157 MMHG | DIASTOLIC BLOOD PRESSURE: 87 MMHG | BODY MASS INDEX: 29.41 KG/M2

## 2021-06-28 DIAGNOSIS — M25.512 LEFT SHOULDER PAIN, UNSPECIFIED CHRONICITY: Primary | ICD-10-CM

## 2021-06-28 PROCEDURE — 20610 DRAIN/INJ JOINT/BURSA W/O US: CPT | Performed by: ORTHOPAEDIC SURGERY

## 2021-06-28 PROCEDURE — 99213 OFFICE O/P EST LOW 20 MIN: CPT | Performed by: ORTHOPAEDIC SURGERY

## 2021-06-28 RX ORDER — BUPIVACAINE HYDROCHLORIDE 2.5 MG/ML
4 INJECTION, SOLUTION EPIDURAL; INFILTRATION; INTRACAUDAL
Status: COMPLETED | OUTPATIENT
Start: 2021-06-28 | End: 2021-06-28

## 2021-06-28 RX ORDER — LIDOCAINE HYDROCHLORIDE 10 MG/ML
4 INJECTION, SOLUTION EPIDURAL; INFILTRATION; INTRACAUDAL; PERINEURAL
Status: COMPLETED | OUTPATIENT
Start: 2021-06-28 | End: 2021-06-28

## 2021-06-28 RX ORDER — TRIAMCINOLONE ACETONIDE 40 MG/ML
40 INJECTION, SUSPENSION INTRA-ARTICULAR; INTRAMUSCULAR
Status: COMPLETED | OUTPATIENT
Start: 2021-06-28 | End: 2021-06-28

## 2021-06-28 RX ADMIN — TRIAMCINOLONE ACETONIDE 40 MG: 40 INJECTION, SUSPENSION INTRA-ARTICULAR; INTRAMUSCULAR at 11:12

## 2021-06-28 RX ADMIN — LIDOCAINE HYDROCHLORIDE 4 ML: 10 INJECTION, SOLUTION EPIDURAL; INFILTRATION; INTRACAUDAL; PERINEURAL at 11:12

## 2021-06-28 RX ADMIN — BUPIVACAINE HYDROCHLORIDE 4 ML: 2.5 INJECTION, SOLUTION EPIDURAL; INFILTRATION; INTRACAUDAL at 11:12

## 2021-06-28 NOTE — PROGRESS NOTES
[facility] [place]    Subjective     CC: Pain of the Left Shoulder      HPI    Gino Maguire is a 51 y.o. female who presents with new problem of: left shoulder pain.  Onset: atraumatic and gradual in nature. The issue has been ongoing for 1 month(s). Pain is a 6/10 on the pain scale. Pain is described as aching, burning, stabbing and shooting. Associated symptoms include pain, popping and grinding. The pain is worse with sleeping, lying on affected side and any movement of the joint; resting and pain medication and/or NSAID improve the pain. Previous treatments have included: NSAIDS and oral steroids.    I have reviewed the following portions of the patient's history:History of Present Illness and review of systems.    She did well with a right shoulder injection.  She thinks she has the same thing with her left shoulder    Review of Systems   Constitutional: Negative.  Negative for chills, fatigue and fever.   HENT: Negative.  Negative for congestion and dental problem.    Eyes: Negative.  Negative for blurred vision.   Respiratory: Negative.  Negative for shortness of breath.    Cardiovascular: Negative.  Negative for leg swelling.   Gastrointestinal: Negative.  Negative for abdominal pain.   Endocrine: Negative.  Negative for polyuria.   Genitourinary: Negative.  Negative for difficulty urinating.   Musculoskeletal: Positive for arthralgias.   Skin: Negative.    Allergic/Immunologic: Negative.    Neurological: Negative.    Hematological: Negative.  Negative for adenopathy.   Psychiatric/Behavioral: Negative.  Negative for behavioral problems.       ROS:    Constiutional:Pt denies fever, chills, nausea, or vomiting.  MSK:as above      Objective      Past Medical History  Past Medical History:   Diagnosis Date   • Abdominal pain    • Abdominal pain, RUQ     Check cmp. refer to [facility] u/s and gen surg eval d/t cholelithiasis noted on CT of abd/pelvis donein ER 1/16   • Acute bronchitis      "Take cefdinir and medrol dose pack as directed. Use otc mucinex. Continue to rest and push fluids. Call or rtc if no better. Gave phenergan w/ codeine cough syrup 5 ml po prn #120ml, no RF per Dr. Cesar   • Acute pharyngitis     Check for monod/t exposure/ Pt will restart flovent as used for esophagitis. If no improvement take the medrol dose pack she was given at least visit   • Acute sinusitis    • Acute upper respiratory infection    • Anxiety    • Body aches    • Cholelithiasis     Reviewed CT of adb/pelvis from ER 1/16 showed cholelithiasis. Will refer for gen surgery eval and [facility] u/s   • Chronic pain disorder    • Cough    • Eosinophilic esophagitis    • Extremity pain    • Gestational diabetes    • Headache    • Influenza    • Labyrinthitis    • Low back pain    • Lumbar strain    • Lumbosacral disc disease    • Neck pain    • Right hip pain    • Shingles    • Strain of thoracic region    • Viral infection    • Viral pharyngitis          Physical Exam  /87   Pulse 71   Ht 160 cm (62.99\")   Wt 75.3 kg (166 lb 0.1 oz)   BMI 29.41 kg/m²     Body mass index is 29.41 kg/m².    Patient is well nourished and well developed.        Ortho Exam  Left shoulder with full motion and positive impingement.    Imaging/Labs/EMG Reviewed:  Imaging Results (Last 24 Hours)     Procedure Component Value Units Date/Time    XR Shoulder 2+ View Left [[id number]] Resulted: 06/28/21 1108     Updated: 06/28/21 1109    Narrative:      Left Shoulder X-Ray  Indication: Pain  AP, scapular Y, and axillary lateral views    Findings:  No fracture  No bony lesion  Normal soft tissues  Normal joint spaces    No prior studies were available for comparison.          Assessment:  1. Left shoulder pain, unspecified chronicity        Plan:  1. Recommend over the counter anti-inflammatories for pain and/or swelling  2. I injected the left shoulder subacromial space with cortisone.  She tolerated injection well.  She will follow-up as " needed    Follow Up:   Return if symptoms worsen or fail to improve.      Medical Decision Making  Management Options : Low - OTC Drugs        Rock Hackett M.D., [facility]  Orthopedic Surgeon  Fellowship Trained Sports Medicine  [facility]  Orthopedics and Sports Medicine  [address], [address]  [place] [address]

## 2021-06-28 NOTE — PROGRESS NOTES
Procedure   Large Joint Arthrocentesis: L subacromial bursa  Date/Time: 6/28/2021 11:12 AM  Consent given by: patient  Site marked: site marked  Timeout: Immediately prior to procedure a time out was called to verify the correct patient, procedure, equipment, support staff and site/side marked as required   Supporting Documentation  Indications: pain   Procedure Details  Location: shoulder - L subacromial bursa  Preparation: Patient was prepped and draped in the usual sterile fashion  Needle size: 23 G  Approach: posterior  Medications administered: 4 mL bupivacaine (PF) 0.25 %; 4 mL lidocaine PF 1% 1 %; 40 mg triamcinolone acetonide 40 MG/ML  Patient tolerance: patient tolerated the procedure well with no immediate complications

## 2021-07-06 ENCOUNTER — HOSPITAL ENCOUNTER (EMERGENCY)
Facility: HOSPITAL | Age: 51
Discharge: HOME | End: 2021-07-06
Attending: EMERGENCY MEDICINE | Admitting: EMERGENCY MEDICINE

## 2021-07-06 ENCOUNTER — APPOINTMENT (OUTPATIENT)
Dept: CT IMAGING | Facility: HOSPITAL | Age: 51
End: 2021-07-06

## 2021-07-06 VITALS
DIASTOLIC BLOOD PRESSURE: 92 MMHG | TEMPERATURE: 98.3 F | HEART RATE: 67 BPM | HEIGHT: 63 IN | SYSTOLIC BLOOD PRESSURE: 166 MMHG | BODY MASS INDEX: 29.41 KG/M2 | WEIGHT: 166 LBS | OXYGEN SATURATION: 100 % | RESPIRATION RATE: 20 BRPM

## 2021-07-06 DIAGNOSIS — R11.2 NON-INTRACTABLE VOMITING WITH NAUSEA, UNSPECIFIED VOMITING TYPE: ICD-10-CM

## 2021-07-06 DIAGNOSIS — R10.84 GENERALIZED ABDOMINAL PAIN: Primary | ICD-10-CM

## 2021-07-06 LAB
ALBUMIN SERPL-MCNC: 4.7 G/DL (ref 3.5–5.2)
ALBUMIN/GLOB SERPL: 1.6 G/DL
ALP SERPL-CCNC: 97 U/L (ref 39–117)
ALT SERPL W P-5'-P-CCNC: 12 U/L (ref 1–33)
ANION GAP SERPL CALCULATED.3IONS-SCNC: 10 MMOL/L (ref 5–15)
AST SERPL-CCNC: 14 U/L (ref 1–32)
B-HCG UR QL: NEGATIVE
BASOPHILS # BLD AUTO: 0.03 10*3/MM3 (ref 0–0.2)
BASOPHILS NFR BLD AUTO: 0.3 % (ref 0–1.5)
BILIRUB SERPL-MCNC: 0.5 MG/DL (ref 0–1.2)
BILIRUB UR QL STRIP: NEGATIVE
BUN SERPL-MCNC: 19 MG/DL (ref 6–20)
BUN/CREAT SERPL: 25 (ref 7–25)
CALCIUM SPEC-SCNC: 10 MG/DL (ref 8.6–10.5)
CHLORIDE SERPL-SCNC: 101 MMOL/L (ref 98–107)
CLARITY UR: CLEAR
CO2 SERPL-SCNC: 27 MMOL/L (ref 22–29)
COLOR UR: YELLOW
CREAT SERPL-MCNC: 0.76 MG/DL (ref 0.57–1)
D-LACTATE SERPL-SCNC: 0.9 MMOL/L (ref 0.5–2)
DEPRECATED RDW RBC AUTO: 47.2 FL (ref 37–54)
EOSINOPHIL # BLD AUTO: 0.15 10*3/MM3 (ref 0–0.4)
EOSINOPHIL NFR BLD AUTO: 1.4 % (ref 0.3–6.2)
ERYTHROCYTE [DISTWIDTH] IN BLOOD BY AUTOMATED COUNT: 14 % (ref 12.3–15.4)
GFR SERPL CREATININE-BSD FRML MDRD: 80 ML/MIN/1.73
GLOBULIN UR ELPH-MCNC: 3 GM/DL
GLUCOSE SERPL-MCNC: 99 MG/DL (ref 65–99)
GLUCOSE UR STRIP-MCNC: NEGATIVE MG/DL
HCT VFR BLD AUTO: 42.6 % (ref 34–46.6)
HGB BLD-MCNC: 13.9 G/DL (ref 12–15.9)
HGB UR QL STRIP.AUTO: NEGATIVE
HOLD SPECIMEN: NORMAL
IMM GRANULOCYTES # BLD AUTO: 0.03 10*3/MM3 (ref 0–0.05)
IMM GRANULOCYTES NFR BLD AUTO: 0.3 % (ref 0–0.5)
INTERNAL NEGATIVE CONTROL: NORMAL
INTERNAL POSITIVE CONTROL: NORMAL
KETONES UR QL STRIP: NEGATIVE
LEUKOCYTE ESTERASE UR QL STRIP.AUTO: NEGATIVE
LIPASE SERPL-CCNC: 23 U/L (ref 13–60)
LYMPHOCYTES # BLD AUTO: 2.07 10*3/MM3 (ref 0.7–3.1)
LYMPHOCYTES NFR BLD AUTO: 19.3 % (ref 19.6–45.3)
Lab: NORMAL
MCH RBC QN AUTO: 30.2 PG (ref 26.6–33)
MCHC RBC AUTO-ENTMCNC: 32.6 G/DL (ref 31.5–35.7)
MCV RBC AUTO: 92.6 FL (ref 79–97)
MONOCYTES # BLD AUTO: 0.57 10*3/MM3 (ref 0.1–0.9)
MONOCYTES NFR BLD AUTO: 5.3 % (ref 5–12)
NEUTROPHILS NFR BLD AUTO: 7.88 10*3/MM3 (ref 1.7–7)
NEUTROPHILS NFR BLD AUTO: 73.4 % (ref 42.7–76)
NITRITE UR QL STRIP: NEGATIVE
NRBC BLD AUTO-RTO: 0 /100 WBC (ref 0–0.2)
PH UR STRIP.AUTO: 7.5 [PH] (ref 5–8)
PLATELET # BLD AUTO: 344 10*3/MM3 (ref 140–450)
PMV BLD AUTO: 9.8 FL (ref 6–12)
POTASSIUM SERPL-SCNC: 3.7 MMOL/L (ref 3.5–5.2)
PROT SERPL-MCNC: 7.7 G/DL (ref 6–8.5)
PROT UR QL STRIP: NEGATIVE
RBC # BLD AUTO: 4.6 10*6/MM3 (ref 3.77–5.28)
SODIUM SERPL-SCNC: 138 MMOL/L (ref 136–145)
SP GR UR STRIP: 1.02 (ref 1–1.03)
TROPONIN T SERPL-MCNC: <0.01 NG/ML (ref 0–0.03)
UROBILINOGEN UR QL STRIP: NORMAL
WBC # BLD AUTO: 10.73 10*3/MM3 (ref 3.4–10.8)
WHOLE BLOOD HOLD SPECIMEN: NORMAL

## 2021-07-06 PROCEDURE — 80053 COMPREHEN METABOLIC PANEL: CPT

## 2021-07-06 PROCEDURE — 83690 ASSAY OF LIPASE: CPT

## 2021-07-06 PROCEDURE — 81003 URINALYSIS AUTO W/O SCOPE: CPT

## 2021-07-06 PROCEDURE — 81025 URINE PREGNANCY TEST: CPT | Performed by: EMERGENCY MEDICINE

## 2021-07-06 PROCEDURE — 84484 ASSAY OF TROPONIN QUANT: CPT | Performed by: NURSE PRACTITIONER

## 2021-07-06 PROCEDURE — 99282 EMERGENCY DEPT VISIT SF MDM: CPT

## 2021-07-06 PROCEDURE — 93005 ELECTROCARDIOGRAM TRACING: CPT | Performed by: NURSE PRACTITIONER

## 2021-07-06 PROCEDURE — 25010000002 ONDANSETRON PER 1 MG: Performed by: NURSE PRACTITIONER

## 2021-07-06 PROCEDURE — 74177 CT ABD & PELVIS W/CONTRAST: CPT

## 2021-07-06 PROCEDURE — 81025 URINE PREGNANCY TEST: CPT

## 2021-07-06 PROCEDURE — 96374 THER/PROPH/DIAG INJ IV PUSH: CPT

## 2021-07-06 PROCEDURE — 83605 ASSAY OF LACTIC ACID: CPT

## 2021-07-06 PROCEDURE — 25010000002 IOPAMIDOL 61 % SOLUTION: Performed by: EMERGENCY MEDICINE

## 2021-07-06 PROCEDURE — 85025 COMPLETE CBC W/AUTO DIFF WBC: CPT

## 2021-07-06 RX ORDER — ONDANSETRON 2 MG/ML
4 INJECTION INTRAMUSCULAR; INTRAVENOUS ONCE
Status: COMPLETED | OUTPATIENT
Start: 2021-07-06 | End: 2021-07-06

## 2021-07-06 RX ORDER — SODIUM CHLORIDE 9 MG/ML
10 INJECTION INTRAVENOUS AS NEEDED
Status: DISCONTINUED | OUTPATIENT
Start: 2021-07-06 | End: 2021-07-06 | Stop reason: HOSPADM

## 2021-07-06 RX ORDER — SODIUM CHLORIDE 0.9 % (FLUSH) 0.9 %
10 SYRINGE (ML) INJECTION AS NEEDED
Status: DISCONTINUED | OUTPATIENT
Start: 2021-07-06 | End: 2021-07-06 | Stop reason: HOSPADM

## 2021-07-06 RX ORDER — ONDANSETRON 4 MG/1
4 TABLET, ORALLY DISINTEGRATING ORAL 4 TIMES DAILY PRN
Qty: 16 TABLET | Refills: 0 | Status: SHIPPED | OUTPATIENT
Start: 2021-07-06 | End: 2022-03-08

## 2021-07-06 RX ADMIN — ONDANSETRON 4 MG: 2 INJECTION INTRAMUSCULAR; INTRAVENOUS at 14:00

## 2021-07-06 RX ADMIN — IOPAMIDOL 90 ML: 612 INJECTION, SOLUTION INTRAVENOUS at 15:26

## 2021-07-06 RX ADMIN — SODIUM CHLORIDE 1000 ML: 9 INJECTION, SOLUTION INTRAVENOUS at 13:59

## 2021-07-06 NOTE — ED PROVIDER NOTES
Subjective   Gino Maguire is a 51 yr old female that presents to the ER with c/o nausea, vomiting.  Patient explains she got up this morning had her coffee as normal.  Within 30 minutes the patient felt extremely nauseated.  She had a banana and some crackers and continued to feel nauseated.  Patient took her Phenergan and then vomited.  Patient complains of abdominal pain however this is not anything unusual according to the patient.  She reports she has con of chronic abdominal pain.  She sees Dr. Johnston.  History of a hiatal hernia, eosinophil esophagitis.  Patient reports that she had diarrhea this morning but this is not uncommon for her either.  She denies any fevers but reports chills.  She denies any urinary frequency, burning, urgency.  Negative for any sick contacts.  She denies chest pain or shortness of breath.  She reports that she did get dizzy.  Patient has a history of 4 C-sections and a laparoscopic tubal.  Reports a history of hypertension.  Patient did not receive her Covid vaccine.      History provided by:  Patient   used: No    Vomiting  The primary symptoms include abdominal pain, nausea, vomiting and diarrhea. Primary symptoms do not include fever or dysuria. The illness began today.   The illness is also significant for chills. The illness does not include back pain.       Review of Systems   Constitutional: Positive for chills. Negative for fever.   HENT: Positive for rhinorrhea and sneezing.    Respiratory: Negative for cough and shortness of breath.    Cardiovascular: Negative for chest pain.   Gastrointestinal: Positive for abdominal pain, diarrhea, nausea and vomiting.   Genitourinary: Negative for dysuria, frequency and urgency.   Musculoskeletal: Negative for back pain.   Allergic/Immunologic: Positive for environmental allergies.   Neurological: Positive for dizziness. Negative for weakness.   All other systems reviewed and are negative.      Past Medical  History:   Diagnosis Date   • Abdominal pain    • Abdominal pain, RUQ     Check cmp. refer to GB u/s and gen surg eval d/t cholelithiasis noted on CT of abd/pelvis donein ER    • Acute bronchitis     Take cefdinir and medrol dose pack as directed. Use otc mucinex. Continue to rest and push fluids. Call or rtc if no better. Gave phenergan w/ codeine cough syrup 5 ml po prn #120ml, no RF per Dr. Cesar   • Acute pharyngitis     Check for monod/t exposure/ Pt will restart flovent as used for esophagitis. If no improvement take the medrol dose pack she was given at least visit   • Acute sinusitis    • Acute upper respiratory infection    • Anxiety    • Body aches    • Cholelithiasis     Reviewed CT of adb/pelvis from ER  showed cholelithiasis. Will refer for gen surgery eval and GB u/s   • Chronic pain disorder    • Cough    • Eosinophilic esophagitis    • Extremity pain    • Gestational diabetes    • Headache    • Influenza    • Labyrinthitis    • Low back pain    • Lumbar strain    • Lumbosacral disc disease    • Neck pain    • Right hip pain    • Shingles    • Strain of thoracic region    • Viral infection    • Viral pharyngitis        Allergies   Allergen Reactions   • Maxalt [Rizatriptan] Anaphylaxis   • Sulfa Antibiotics Rash   • Sumatriptan Anaphylaxis   • Zolmitriptan Anaphylaxis   • Labetalol Rash       Past Surgical History:   Procedure Laterality Date   • BREAST BIOPSY Left 10/2018    Benign STEREO    •  SECTION      97, 00, 04, 12   • DILATATION AND CURETTAGE     • ENDOSCOPY     • OOPHORECTOMY Right 2017   • WISDOM TOOTH EXTRACTION         Family History   Problem Relation Age of Onset   • Arthritis Mother    • Hypertension Mother    • Thyroid disease Mother    • Arthritis Father    • Hypertension Father    • Heart disease Father    • Heart attack Other    • Arthritis Other    • Hypertension Other    • Migraines Other    • Stroke Other    • Breast cancer Other 31   •  Ovarian cancer Cousin 42   • Heart failure Paternal Grandmother    • Endometrial cancer Neg Hx    • Colon cancer Neg Hx        Social History     Socioeconomic History   • Marital status:      Spouse name: Not on file   • Number of children: Not on file   • Years of education: Not on file   • Highest education level: Not on file   Tobacco Use   • Smoking status: Former Smoker     Packs/day: 1.00     Years: 3.00     Pack years: 3.00     Types: Cigarettes     Quit date: 2009     Years since quittin.9   • Smokeless tobacco: Never Used   Substance and Sexual Activity   • Alcohol use: Yes     Comment: social   • Drug use: No   • Sexual activity: Defer           Objective   Physical Exam  Vitals and nursing note reviewed.   Constitutional:       General: She is not in acute distress.     Appearance: Normal appearance. She is well-developed. She is not ill-appearing or toxic-appearing.   HENT:      Head: Normocephalic and atraumatic.      Nose: Nose normal.      Mouth/Throat:      Mouth: Mucous membranes are moist.   Eyes:      General: Lids are normal.      Conjunctiva/sclera: Conjunctivae normal.   Neck:      Trachea: Trachea normal.   Cardiovascular:      Rate and Rhythm: Regular rhythm.      Pulses: Normal pulses.      Heart sounds: Normal heart sounds.   Pulmonary:      Effort: Pulmonary effort is normal. No respiratory distress.      Breath sounds: Normal breath sounds. No decreased breath sounds, wheezing, rhonchi or rales.   Abdominal:      General: Bowel sounds are normal. There is no distension.      Palpations: Abdomen is soft.      Tenderness: There is abdominal tenderness (mild lower abd tenderness).   Musculoskeletal:         General: Normal range of motion.      Cervical back: Full passive range of motion without pain and normal range of motion.   Skin:     General: Skin is warm and dry.      Findings: No rash.   Neurological:      General: No focal deficit present.      Mental Status: She is  alert and oriented to person, place, and time.      Cranial Nerves: No cranial nerve deficit.   Psychiatric:         Mood and Affect: Mood normal.         Speech: Speech normal.         Behavior: Behavior normal. Behavior is cooperative.         Procedures           ED Course  ED Course as of Jul 06 2043   Tue Jul 06, 2021   1717 Troponin T: <0.010 [KG]   1718 Patient has not vomited since arrival.  Patient's work-up is benign.  Patient will be discharged home.  Patient to take Zofran as needed.  Patient to follow-up with her GI specialist.  Patient to return to the ED as needed.  Patient agrees and verbalized understanding.    [KG]      ED Course User Index  [KG] Mar Hearn, CLAY                Recent Results (from the past 24 hour(s))   Comprehensive Metabolic Panel    Collection Time: 07/06/21 12:15 PM    Specimen: Blood   Result Value Ref Range    Glucose 99 65 - 99 mg/dL    BUN 19 6 - 20 mg/dL    Creatinine 0.76 0.57 - 1.00 mg/dL    Sodium 138 136 - 145 mmol/L    Potassium 3.7 3.5 - 5.2 mmol/L    Chloride 101 98 - 107 mmol/L    CO2 27.0 22.0 - 29.0 mmol/L    Calcium 10.0 8.6 - 10.5 mg/dL    Total Protein 7.7 6.0 - 8.5 g/dL    Albumin 4.70 3.50 - 5.20 g/dL    ALT (SGPT) 12 1 - 33 U/L    AST (SGOT) 14 1 - 32 U/L    Alkaline Phosphatase 97 39 - 117 U/L    Total Bilirubin 0.5 0.0 - 1.2 mg/dL    eGFR Non African Amer 80 >60 mL/min/1.73    Globulin 3.0 gm/dL    A/G Ratio 1.6 g/dL    BUN/Creatinine Ratio 25.0 7.0 - 25.0    Anion Gap 10.0 5.0 - 15.0 mmol/L   Lipase    Collection Time: 07/06/21 12:15 PM    Specimen: Blood   Result Value Ref Range    Lipase 23 13 - 60 U/L   Urinalysis With Microscopic If Indicated (No Culture) - Urine, Clean Catch    Collection Time: 07/06/21 12:15 PM    Specimen: Urine, Clean Catch   Result Value Ref Range    Color, UA Yellow Yellow, Straw    Appearance, UA Clear Clear    pH, UA 7.5 5.0 - 8.0    Specific Gravity, UA 1.018 1.001 - 1.030    Glucose, UA Negative Negative     Ketones, UA Negative Negative    Bilirubin, UA Negative Negative    Blood, UA Negative Negative    Protein, UA Negative Negative    Leuk Esterase, UA Negative Negative    Nitrite, UA Negative Negative    Urobilinogen, UA 0.2 E.U./dL 0.2 - 1.0 E.U./dL   Lactic Acid, Plasma    Collection Time: 07/06/21 12:15 PM    Specimen: Blood   Result Value Ref Range    Lactate 0.9 0.5 - 2.0 mmol/L   Green Top (Gel)    Collection Time: 07/06/21 12:15 PM   Result Value Ref Range    Extra Tube Hold for add-ons.    Lavender Top    Collection Time: 07/06/21 12:15 PM   Result Value Ref Range    Extra Tube hold for add-on    Gold Top - SST    Collection Time: 07/06/21 12:15 PM   Result Value Ref Range    Extra Tube Hold for add-ons.    Gray Top    Collection Time: 07/06/21 12:15 PM   Result Value Ref Range    Extra Tube Hold for add-ons.    CBC Auto Differential    Collection Time: 07/06/21 12:15 PM    Specimen: Blood   Result Value Ref Range    WBC 10.73 3.40 - 10.80 10*3/mm3    RBC 4.60 3.77 - 5.28 10*6/mm3    Hemoglobin 13.9 12.0 - 15.9 g/dL    Hematocrit 42.6 34.0 - 46.6 %    MCV 92.6 79.0 - 97.0 fL    MCH 30.2 26.6 - 33.0 pg    MCHC 32.6 31.5 - 35.7 g/dL    RDW 14.0 12.3 - 15.4 %    RDW-SD 47.2 37.0 - 54.0 fl    MPV 9.8 6.0 - 12.0 fL    Platelets 344 140 - 450 10*3/mm3    Neutrophil % 73.4 42.7 - 76.0 %    Lymphocyte % 19.3 (L) 19.6 - 45.3 %    Monocyte % 5.3 5.0 - 12.0 %    Eosinophil % 1.4 0.3 - 6.2 %    Basophil % 0.3 0.0 - 1.5 %    Immature Grans % 0.3 0.0 - 0.5 %    Neutrophils, Absolute 7.88 (H) 1.70 - 7.00 10*3/mm3    Lymphocytes, Absolute 2.07 0.70 - 3.10 10*3/mm3    Monocytes, Absolute 0.57 0.10 - 0.90 10*3/mm3    Eosinophils, Absolute 0.15 0.00 - 0.40 10*3/mm3    Basophils, Absolute 0.03 0.00 - 0.20 10*3/mm3    Immature Grans, Absolute 0.03 0.00 - 0.05 10*3/mm3    nRBC 0.0 0.0 - 0.2 /100 WBC   POC Pregnancy, Urine    Collection Time: 07/06/21 12:20 PM    Specimen: Urine   Result Value Ref Range    HCG, Urine, QL Negative  "Negative    Lot Number [id number]     Internal Positive Control Passed Passed    Internal Negative Control Passed Passed   Troponin    Collection Time: 07/06/21  4:19 PM    Specimen: Blood   Result Value Ref Range    Troponin T <0.010 0.000 - 0.030 ng/mL     Note: In addition to lab results from this visit, the labs listed above may include labs taken at another facility or during a different encounter within the last 24 hours. Please correlate lab times with ED admission and discharge times for further clarification of the services performed during this visit.    CT Abdomen Pelvis With Contrast   Final Result   No acute findings in the abdomen and pelvis.           This report was finalized on 7/6/2021 3:42 PM by Siddharth Enriquez.            Vitals:    07/06/21 1208 07/06/21 1548   BP: 166/92    Patient Position: Sitting    Pulse: 67    Resp: 20    Temp: 98.3 °F (36.8 °C)    TempSrc: Oral    SpO2: 100% 100%   Weight: 75.3 kg (166 lb)    Height: 160 cm (63\")      Medications   sodium chloride 0.9 % bolus 1,000 mL (0 mL Intravenous Stopped 7/6/21 1600)   ondansetron (ZOFRAN) injection 4 mg (4 mg Intravenous Given 7/6/21 1400)   iopamidol (ISOVUE-300) 61 % injection 90 mL (90 mL Intravenous Given 7/6/21 1526)     ECG/EMG Results (last 24 hours)     Procedure Component Value Units Date/Time    ECG 12 Lead [[id number]] Collected: 07/06/21 1625     Updated: 07/06/21 1626    ECG 12 Lead [[id number]] Collected: 07/06/21 1650     Updated: 07/06/21 1651        ECG 12 Lead         ECG 12 Lead           Recent Results (from the past 24 hour(s))   Comprehensive Metabolic Panel    Collection Time: 07/06/21 12:15 PM    Specimen: Blood   Result Value Ref Range    Glucose 99 65 - 99 mg/dL    BUN 19 6 - 20 mg/dL    Creatinine 0.76 0.57 - 1.00 mg/dL    Sodium 138 136 - 145 mmol/L    Potassium 3.7 3.5 - 5.2 mmol/L    Chloride 101 98 - 107 mmol/L    CO2 27.0 22.0 - 29.0 mmol/L    Calcium 10.0 8.6 - 10.5 mg/dL    Total Protein 7.7 6.0 - " 8.5 g/dL    Albumin 4.70 3.50 - 5.20 g/dL    ALT (SGPT) 12 1 - 33 U/L    AST (SGOT) 14 1 - 32 U/L    Alkaline Phosphatase 97 39 - 117 U/L    Total Bilirubin 0.5 0.0 - 1.2 mg/dL    eGFR Non African Amer 80 >60 mL/min/1.73    Globulin 3.0 gm/dL    A/G Ratio 1.6 g/dL    BUN/Creatinine Ratio 25.0 7.0 - 25.0    Anion Gap 10.0 5.0 - 15.0 mmol/L   Lipase    Collection Time: 07/06/21 12:15 PM    Specimen: Blood   Result Value Ref Range    Lipase 23 13 - 60 U/L   Urinalysis With Microscopic If Indicated (No Culture) - Urine, Clean Catch    Collection Time: 07/06/21 12:15 PM    Specimen: Urine, Clean Catch   Result Value Ref Range    Color, UA Yellow Yellow, Straw    Appearance, UA Clear Clear    pH, UA 7.5 5.0 - 8.0    Specific Gravity, UA 1.018 1.001 - 1.030    Glucose, UA Negative Negative    Ketones, UA Negative Negative    Bilirubin, UA Negative Negative    Blood, UA Negative Negative    Protein, UA Negative Negative    Leuk Esterase, UA Negative Negative    Nitrite, UA Negative Negative    Urobilinogen, UA 0.2 E.U./dL 0.2 - 1.0 E.U./dL   Lactic Acid, Plasma    Collection Time: 07/06/21 12:15 PM    Specimen: Blood   Result Value Ref Range    Lactate 0.9 0.5 - 2.0 mmol/L   Green Top (Gel)    Collection Time: 07/06/21 12:15 PM   Result Value Ref Range    Extra Tube Hold for add-ons.    Lavender Top    Collection Time: 07/06/21 12:15 PM   Result Value Ref Range    Extra Tube hold for add-on    Gold Top - SST    Collection Time: 07/06/21 12:15 PM   Result Value Ref Range    Extra Tube Hold for add-ons.    Gray Top    Collection Time: 07/06/21 12:15 PM   Result Value Ref Range    Extra Tube Hold for add-ons.    CBC Auto Differential    Collection Time: 07/06/21 12:15 PM    Specimen: Blood   Result Value Ref Range    WBC 10.73 3.40 - 10.80 10*3/mm3    RBC 4.60 3.77 - 5.28 10*6/mm3    Hemoglobin 13.9 12.0 - 15.9 g/dL    Hematocrit 42.6 34.0 - 46.6 %    MCV 92.6 79.0 - 97.0 fL    MCH 30.2 26.6 - 33.0 pg    MCHC 32.6 31.5 - 35.7  "g/dL    RDW 14.0 12.3 - 15.4 %    RDW-SD 47.2 37.0 - 54.0 fl    MPV 9.8 6.0 - 12.0 fL    Platelets 344 140 - 450 10*3/mm3    Neutrophil % 73.4 42.7 - 76.0 %    Lymphocyte % 19.3 (L) 19.6 - 45.3 %    Monocyte % 5.3 5.0 - 12.0 %    Eosinophil % 1.4 0.3 - 6.2 %    Basophil % 0.3 0.0 - 1.5 %    Immature Grans % 0.3 0.0 - 0.5 %    Neutrophils, Absolute 7.88 (H) 1.70 - 7.00 10*3/mm3    Lymphocytes, Absolute 2.07 0.70 - 3.10 10*3/mm3    Monocytes, Absolute 0.57 0.10 - 0.90 10*3/mm3    Eosinophils, Absolute 0.15 0.00 - 0.40 10*3/mm3    Basophils, Absolute 0.03 0.00 - 0.20 10*3/mm3    Immature Grans, Absolute 0.03 0.00 - 0.05 10*3/mm3    nRBC 0.0 0.0 - 0.2 /100 WBC   POC Pregnancy, Urine    Collection Time: 07/06/21 12:20 PM    Specimen: Urine   Result Value Ref Range    HCG, Urine, QL Negative Negative    Lot Number [id number]     Internal Positive Control Passed Passed    Internal Negative Control Passed Passed   Troponin    Collection Time: 07/06/21  4:19 PM    Specimen: Blood   Result Value Ref Range    Troponin T <0.010 0.000 - 0.030 ng/mL     Note: In addition to lab results from this visit, the labs listed above may include labs taken at another facility or during a different encounter within the last 24 hours. Please correlate lab times with ED admission and discharge times for further clarification of the services performed during this visit.    CT Abdomen Pelvis With Contrast   Final Result   No acute findings in the abdomen and pelvis.           This report was finalized on 7/6/2021 3:42 PM by Siddharth Enriquez.            Vitals:    07/06/21 1208 07/06/21 1548   BP: 166/92    Patient Position: Sitting    Pulse: 67    Resp: 20    Temp: 98.3 °F (36.8 °C)    TempSrc: Oral    SpO2: 100% 100%   Weight: 75.3 kg (166 lb)    Height: 160 cm (63\")      Medications   sodium chloride 0.9 % bolus 1,000 mL (0 mL Intravenous Stopped 7/6/21 1600)   ondansetron (ZOFRAN) injection 4 mg (4 mg Intravenous Given 7/6/21 1400) "   iopamidol (ISOVUE-300) 61 % injection 90 mL (90 mL Intravenous Given 7/6/21 1526)     ECG/EMG Results (last 24 hours)     Procedure Component Value Units Date/Time    ECG 12 Lead [[id number]] Collected: 07/06/21 1625     Updated: 07/06/21 1626    ECG 12 Lead [[id number]] Collected: 07/06/21 1650     Updated: 07/06/21 1651        ECG 12 Lead         ECG 12 Lead                                        MDM    Final diagnoses:   Generalized abdominal pain   Non-intractable vomiting with nausea, unspecified vomiting type       ED Disposition  ED Disposition     ED Disposition Condition Comment    Discharge Stable           Tangela Carter, APRN  [address] [id number] [phone]               Medication List      New Prescriptions    ondansetron ODT 4 MG disintegrating tablet  Commonly known as: ZOFRAN-ODT  Place 1 tablet on the tongue 4 (Four) Times a Day As Needed for Nausea or Vomiting.           Where to Get Your Medications      These medications were sent to [id number] - [place], [place] - [address] [facility] [address] - [phone]  - [phone]   [address] [facility] [facility] [address]    Phone: [phone]   · ondansetron ODT 4 MG disintegrating tablet          Mar Hearn, APRN  07/06/21 2044

## 2021-07-13 LAB
QT INTERVAL: 354 MS
QT INTERVAL: 396 MS
QTC INTERVAL: 405 MS
QTC INTERVAL: 408 MS

## 2021-07-17 DIAGNOSIS — I10 ESSENTIAL HYPERTENSION: ICD-10-CM

## 2021-07-17 DIAGNOSIS — F41.9 ANXIETY: ICD-10-CM

## 2021-07-19 RX ORDER — PROPRANOLOL HYDROCHLORIDE 10 MG/1
TABLET ORAL
Qty: 60 TABLET | Refills: 5 | Status: SHIPPED | OUTPATIENT
Start: 2021-07-19

## 2021-07-19 NOTE — TELEPHONE ENCOUNTER
Last Office Visit: 1/18/2021   Next Office Visit: 1/21/2022    Labs completed in past 6 months? Yes   Labs completed in past year? Yes      Last Refill Date: 7/14/2020  Quantity: 60  Refills: 11     Pharmacy:    Please review pended refill request for any changes needed on refills or quantities. Thank you!

## 2021-08-02 ENCOUNTER — TELEPHONE (OUTPATIENT)
Dept: ORTHOPEDIC SURGERY | Facility: CLINIC | Age: 51
End: 2021-08-02

## 2021-08-02 DIAGNOSIS — M25.512 LEFT SHOULDER PAIN, UNSPECIFIED CHRONICITY: Primary | ICD-10-CM

## 2021-08-02 NOTE — TELEPHONE ENCOUNTER
I called patient to let her know her MRI has been ordered and that someone will be in contact with her to schedule. I let her know to call our office after the MRI for a follow up appointment , she understood.

## 2021-08-02 NOTE — TELEPHONE ENCOUNTER
PATIENT CALLED AND STATED THE INJECTION IN HER SHOULDER DID NOT HELP. SHE WAS TOLD TO CALL BACK TO GET AN IMAGE TAKEN OF HER SHOULDER. PATIENT CAN BE REACHED @ [phone]

## 2021-08-02 NOTE — TELEPHONE ENCOUNTER
Dr. Hackett,   Please see message below, I called patient back and she stated that her shoulder is worse and at her last office visit you mentioned that if the injection did not help for her to call back and you may want to order a MRI, please advise, thank you.

## 2021-08-03 DIAGNOSIS — M17.12 PRIMARY OSTEOARTHRITIS OF LEFT KNEE: ICD-10-CM

## 2021-08-04 RX ORDER — MELOXICAM 15 MG/1
TABLET ORAL
Qty: 60 TABLET | Refills: 0 | Status: SHIPPED | OUTPATIENT
Start: 2021-08-04 | End: 2022-08-08

## 2021-08-09 ENCOUNTER — HOSPITAL ENCOUNTER (OUTPATIENT)
Dept: MRI IMAGING | Facility: HOSPITAL | Age: 51
Discharge: HOME | End: 2021-08-09
Admitting: ORTHOPAEDIC SURGERY

## 2021-08-09 DIAGNOSIS — M25.512 LEFT SHOULDER PAIN, UNSPECIFIED CHRONICITY: ICD-10-CM

## 2021-08-09 PROCEDURE — 73221 MRI JOINT UPR EXTREM W/O DYE: CPT

## 2021-08-20 ENCOUNTER — OFFICE VISIT (OUTPATIENT)
Dept: ORTHOPEDIC SURGERY | Facility: CLINIC | Age: 51
End: 2021-08-20

## 2021-08-20 VITALS
WEIGHT: 164.9 LBS | DIASTOLIC BLOOD PRESSURE: 92 MMHG | HEIGHT: 63 IN | SYSTOLIC BLOOD PRESSURE: 155 MMHG | BODY MASS INDEX: 29.22 KG/M2 | HEART RATE: 84 BPM

## 2021-08-20 DIAGNOSIS — S46.012D RUPTURE OF LEFT SUPRASPINATUS TENDON, SUBSEQUENT ENCOUNTER: Primary | ICD-10-CM

## 2021-08-20 PROCEDURE — 99213 OFFICE O/P EST LOW 20 MIN: CPT | Performed by: ORTHOPAEDIC SURGERY

## 2021-08-20 NOTE — PROGRESS NOTES
[facility] Note    Subjective     CC: Follow-up (7 week MRI (8/9/21) recheck - Left shoulder pain)      HPI    Gino Maguire is a 51 y.o. female. She is follow-up MRI left shoulder from August 9. She still has pain. She has not had physical therapy yet. She did have a cortisone shot.    Review of Systems   Constitutional: Negative.  Negative for chills, fatigue and fever.   HENT: Negative.  Negative for congestion and dental problem.    Eyes: Negative.  Negative for blurred vision.   Respiratory: Negative.  Negative for shortness of breath.    Cardiovascular: Negative.  Negative for leg swelling.   Gastrointestinal: Negative.  Negative for abdominal pain.   Endocrine: Negative.  Negative for polyuria.   Genitourinary: Negative.  Negative for difficulty urinating.   Musculoskeletal: Positive for arthralgias.   Skin: Negative.    Allergic/Immunologic: Negative.    Neurological: Negative.    Hematological: Negative.  Negative for adenopathy.   Psychiatric/Behavioral: Negative.  Negative for behavioral problems.       ROS:    Constiutional:Pt denies fever, chills, nausea, or vomiting.  MSK:as above      Objective      Past Medical History  Past Medical History:   Diagnosis Date   • Abdominal pain    • Abdominal pain, RUQ     Check cmp. refer to [facility] u/s and gen surg eval d/t cholelithiasis noted on CT of abd/pelvis donein ER 1/16   • Acute bronchitis     Take cefdinir and medrol dose pack as directed. Use otc mucinex. Continue to rest and push fluids. Call or rtc if no better. Gave phenergan w/ codeine cough syrup 5 ml po prn #120ml, no RF per Dr. Cesar   • Acute pharyngitis     Check for monod/t exposure/ Pt will restart flovent as used for esophagitis. If no improvement take the medrol dose pack she was given at least visit   • Acute sinusitis    • Acute upper respiratory infection    • Anxiety    • Body aches    • Cholelithiasis     Reviewed CT of adb/pelvis from ER 1/16 showed  "cholelithiasis. Will refer for gen surgery eval and GB u/s   • Chronic pain disorder    • Cough    • Eosinophilic esophagitis    • Extremity pain    • Gestational diabetes    • Headache    • Influenza    • Labyrinthitis    • Low back pain    • Lumbar strain    • Lumbosacral disc disease    • Neck pain    • Right hip pain    • Shingles    • Strain of thoracic region    • Viral infection    • Viral pharyngitis          Physical Exam  /92   Pulse 84   Ht 160 cm (62.99\")   Wt 74.8 kg (164 lb 14.5 oz)   BMI 29.22 kg/m²     Body mass index is 29.22 kg/m².    Patient is well nourished and well developed.        Ortho Exam  Left shoulder full motion. Positive impingement. 4+ out of 5 rotator cuff strength    Imaging/Labs/EMG Reviewed:  Imaging Results (Last 24 Hours)     ** No results found for the last 24 hours. **      I viewed her MRI from August 9. I again partial-thickness cuff tear. It was read as a small full-thickness tear but I am not sure    Assessment:  1. Rupture of left supraspinatus tendon, subsequent encounter        Plan:  1. Recommend over the counter anti-inflammatories for pain and/or swelling  2. She is not eager to have surgery. She would like to try physical therapy. I will see her back in a month.    Follow Up:   Return in about 1 month (around 9/20/2021).      Medical Decision Making  Management Options : Low - OT or PT Therapy         Rock Hackett M.D., FAAOS  Orthopedic Surgeon  Fellowship Trained Sports Medicine  [facility]  Orthopedics and Sports Medicine  [address], [address]  [place] [address]    EMR Dragon/Transcription disclaimer:  Much of this encounter note is an electronic transcription of spoken language to printed text. Electronic transcription of spoken language may permit erroneous, or at times, nonsensical words or phrases to be inadvertently transcribed. Although I have reviewed the note for such errors, some may still exist.  "

## 2021-09-09 ENCOUNTER — LAB (OUTPATIENT)
Dept: LAB | Facility: HOSPITAL | Age: 51
End: 2021-09-09

## 2021-09-09 DIAGNOSIS — Z00.00 ANNUAL PHYSICAL EXAM: ICD-10-CM

## 2021-09-09 DIAGNOSIS — E53.8 B12 DEFICIENCY: ICD-10-CM

## 2021-09-09 DIAGNOSIS — Z20.822 ENCOUNTER FOR SCREENING LABORATORY TESTING FOR COVID-19 VIRUS: ICD-10-CM

## 2021-09-09 PROCEDURE — 82306 VITAMIN D 25 HYDROXY: CPT

## 2021-09-09 PROCEDURE — 82607 VITAMIN B-12: CPT

## 2021-09-09 PROCEDURE — 80061 LIPID PANEL: CPT

## 2021-09-09 PROCEDURE — C9803 HOPD COVID-19 SPEC COLLECT: HCPCS

## 2021-09-09 PROCEDURE — 86769 SARS-COV-2 COVID-19 ANTIBODY: CPT

## 2021-09-10 LAB
25(OH)D3 SERPL-MCNC: 55.7 NG/ML
CHOLEST SERPL-MCNC: 205 MG/DL (ref 0–200)
HDLC SERPL-MCNC: 62 MG/DL (ref 40–60)
LDLC SERPL CALC-MCNC: 126 MG/DL (ref 0–100)
LDLC/HDLC SERPL: 2 {RATIO}
SARS-COV-2 AB SERPL QL IA: NEGATIVE
TRIGL SERPL-MCNC: 96 MG/DL (ref 0–150)
VIT B12 BLD-MCNC: 1984 PG/ML (ref 211–946)
VLDLC SERPL-MCNC: 17 MG/DL (ref 5–40)

## 2021-09-13 NOTE — PROGRESS NOTES
I have our clinic mgr checking on the Covid antibody test.  Cholesterol numbers a little higher, so cont to work on your diet and exercise. Great job on the weight loss.  I agree cut back on your B12.

## 2021-09-20 ENCOUNTER — OFFICE VISIT (OUTPATIENT)
Dept: ORTHOPEDIC SURGERY | Facility: CLINIC | Age: 51
End: 2021-09-20

## 2021-09-20 VITALS
DIASTOLIC BLOOD PRESSURE: 85 MMHG | HEIGHT: 63 IN | WEIGHT: 167 LBS | BODY MASS INDEX: 29.59 KG/M2 | SYSTOLIC BLOOD PRESSURE: 174 MMHG | HEART RATE: 76 BPM

## 2021-09-20 DIAGNOSIS — S46.012D RUPTURE OF LEFT SUPRASPINATUS TENDON, SUBSEQUENT ENCOUNTER: Primary | ICD-10-CM

## 2021-09-20 PROCEDURE — 99214 OFFICE O/P EST MOD 30 MIN: CPT | Performed by: ORTHOPAEDIC SURGERY

## 2021-09-20 NOTE — PROGRESS NOTES
[facility] [place] [facility] Note    Subjective     CC: Follow-up (4 week recheck - Rupture of left supraspinatus tendon)      HPI    Gino Maguire is a 51 y.o. female.  She has worsening left shoulder pain.  She has had over 4 months.  She has had multiple months of physical therapy and anti-inflammatories.  She is getting worse not better.  Previous MRI showed a full-thickness cuff tear    Review of Systems   Constitutional: Negative.  Negative for chills, fatigue and fever.   HENT: Negative.  Negative for congestion and dental problem.    Eyes: Negative.  Negative for blurred vision.   Respiratory: Negative.  Negative for shortness of breath.    Cardiovascular: Negative.  Negative for leg swelling.   Gastrointestinal: Negative.  Negative for abdominal pain.   Endocrine: Negative.  Negative for polyuria.   Genitourinary: Negative.  Negative for difficulty urinating.   Musculoskeletal: Positive for arthralgias.   Skin: Negative.    Allergic/Immunologic: Negative.    Neurological: Negative.    Hematological: Negative.  Negative for adenopathy.   Psychiatric/Behavioral: Negative.  Negative for behavioral problems.       ROS:    Constiutional:Pt denies fever, chills, nausea, or vomiting.  MSK:as above      Objective      Past Medical History  Past Medical History:   Diagnosis Date   • Abdominal pain    • Abdominal pain, RUQ     Check cmp. refer to [facility] u/s and gen surg eval d/t cholelithiasis noted on CT of abd/pelvis donein ER 1/16   • Acute bronchitis     Take cefdinir and medrol dose pack as directed. Use otc mucinex. Continue to rest and push fluids. Call or rtc if no better. Gave phenergan w/ codeine cough syrup 5 ml po prn #120ml, no RF per Dr. Cesar   • Acute pharyngitis     Check for monod/t exposure/ Pt will restart flovent as used for esophagitis. If no improvement take the medrol dose pack she was given at least visit   • Acute sinusitis    • Acute upper respiratory infection    • Anxiety    •  "Body aches    • Cholelithiasis     Reviewed CT of adb/pelvis from ER 1/16 showed cholelithiasis. Will refer for gen surgery eval and GB u/s   • Chronic pain disorder    • Cough    • Eosinophilic esophagitis    • Extremity pain    • Gestational diabetes    • Headache    • Influenza    • Labyrinthitis    • Low back pain    • Lumbar strain    • Lumbosacral disc disease    • Neck pain    • Right hip pain    • Shingles    • Strain of thoracic region    • Viral infection    • Viral pharyngitis          Physical Exam  /85 Comment: Pt hasn't taken BP meds today  Pulse 76   Ht 160 cm (62.99\")   Wt 75.8 kg (167 lb)   BMI 29.59 kg/m²     Body mass index is 29.59 kg/m².    Patient is well nourished and well developed.        Ortho Exam  She has full motion.  Positive impingement.  Rotator cuff strength 4-5.    Imaging/Labs/EMG Reviewed:  Imaging Results (Last 24 Hours)     ** No results found for the last 24 hours. **          Assessment:  1. Rupture of left supraspinatus tendon, subsequent encounter        Plan:  1. I recommend left shoulder arthroscopy with rotator cuff repair.Treatment options and alternatives were discussed with patient.  Surgical risks include but are not limited to pain, bleeding, infection, failure to relieve symptoms, need for further procedures, recurrence of symptoms, damage to healthy adjacent structures, hardware loosening/failure, stiffness, weakness, scar, blood clots/DVT/PE, loss of limb or life. We also discussed the postoperative protocol and expected outcome although no guarantees are possible with surgery. All questions were answered; the patient would like to proceed with surgical intervention.    Follow Up:   Return for 1 week after surgery.      Medical Decision Making  Management Options : Moderate - Decision regarding minor surgery with identified patient  or procedure risk factors        Rock Hackett M.D., [facility]  Orthopedic Surgeon  Fellowship Trained Sports " [facility]  Orthopedics and Sports Medicine  [address], [address]  [place] [address]    EMR Dragon/Transcription disclaimer:  Much of this encounter note is an electronic transcription of spoken language to printed text. Electronic transcription of spoken language may permit erroneous, or at times, nonsensical words or phrases to be inadvertently transcribed. Although I have reviewed the note for such errors, some may still exist.

## 2021-09-21 ENCOUNTER — TELEPHONE (OUTPATIENT)
Dept: ORTHOPEDIC SURGERY | Facility: CLINIC | Age: 51
End: 2021-09-21

## 2021-09-30 ENCOUNTER — TELEPHONE (OUTPATIENT)
Dept: ORTHOPEDIC SURGERY | Facility: CLINIC | Age: 51
End: 2021-09-30

## 2021-09-30 NOTE — TELEPHONE ENCOUNTER
Patient stated she forgot about the appointment because her injections in both knees are doing great. She will call back if she needs to make another appointment.

## 2021-10-19 ENCOUNTER — TELEPHONE (OUTPATIENT)
Dept: NEUROLOGY | Facility: OTHER | Age: 51
End: 2021-10-19

## 2021-10-19 ENCOUNTER — OFFICE VISIT (OUTPATIENT)
Dept: INTERNAL MEDICINE | Facility: CLINIC | Age: 51
End: 2021-10-19

## 2021-10-19 VITALS
OXYGEN SATURATION: 97 % | HEART RATE: 74 BPM | WEIGHT: 171.6 LBS | BODY MASS INDEX: 30.41 KG/M2 | DIASTOLIC BLOOD PRESSURE: 90 MMHG | SYSTOLIC BLOOD PRESSURE: 168 MMHG

## 2021-10-19 DIAGNOSIS — I10 ESSENTIAL HYPERTENSION: ICD-10-CM

## 2021-10-19 DIAGNOSIS — M54.42 ACUTE LEFT-SIDED LOW BACK PAIN WITH LEFT-SIDED SCIATICA: Primary | ICD-10-CM

## 2021-10-19 PROCEDURE — 96372 THER/PROPH/DIAG INJ SC/IM: CPT | Performed by: PHYSICIAN ASSISTANT

## 2021-10-19 PROCEDURE — 99214 OFFICE O/P EST MOD 30 MIN: CPT | Performed by: PHYSICIAN ASSISTANT

## 2021-10-19 RX ORDER — CYCLOBENZAPRINE HCL 5 MG
5 TABLET ORAL 3 TIMES DAILY PRN
Qty: 20 TABLET | Refills: 0 | Status: SHIPPED | OUTPATIENT
Start: 2021-10-19 | End: 2022-08-08

## 2021-10-19 RX ORDER — METHYLPREDNISOLONE 4 MG/1
TABLET ORAL
Qty: 21 TABLET | Refills: 0 | Status: SHIPPED | OUTPATIENT
Start: 2021-10-19 | End: 2022-02-23

## 2021-10-19 RX ORDER — DEXAMETHASONE SODIUM PHOSPHATE 4 MG/ML
4 INJECTION, SOLUTION INTRA-ARTICULAR; INTRALESIONAL; INTRAMUSCULAR; INTRAVENOUS; SOFT TISSUE ONCE
Status: COMPLETED | OUTPATIENT
Start: 2021-10-19 | End: 2021-10-19

## 2021-10-19 RX ORDER — HYDROCHLOROTHIAZIDE 25 MG/1
25 TABLET ORAL DAILY
Qty: 30 TABLET | Refills: 3 | Status: SHIPPED | OUTPATIENT
Start: 2021-10-19 | End: 2022-04-08

## 2021-10-19 RX ADMIN — DEXAMETHASONE SODIUM PHOSPHATE 4 MG: 4 INJECTION, SOLUTION INTRA-ARTICULAR; INTRALESIONAL; INTRAMUSCULAR; INTRAVENOUS; SOFT TISSUE at 10:58

## 2021-10-19 NOTE — TELEPHONE ENCOUNTER
PT CALLED IN STATED SHE THINKS SHE PULLED SOMETHING IN HER LOWER BACK STATES SHE CAN WALK BUT HURTS TO BEND . ADVISED PER REFERENCE TOOL TO SEE HER PCP .

## 2021-10-19 NOTE — PROGRESS NOTES
Chief Complaint   Patient presents with   • Back Pain     Acute        Subjective     Gino Maguire is a 51 y.o. female.        History of Present Illness     About a week ago pt noticed some pulling in her left lower back. Tried to rest her back, did some exercises and put her feet up like she has done in the past. Did have some improvement until yesterday she leaned over and had sudden onset of pain in her lower back and radiating into     Pt has been watching her salt and sugar intake in an effort to get her blood pressure down. She has been checking it at home.      Current Outpatient Medications:   •  Acetylcysteine (NAC) capsule capsule, Take  by mouth., Disp: , Rfl:   •  albuterol sulfate  (90 Base) MCG/ACT inhaler, Inhale 2 puffs Every 4 (Four) Hours As Needed for Wheezing or Shortness of Air., Disp: 18 g, Rfl: 11  •  ALPRAZolam (XANAX) 0.25 MG tablet, Take 1 tablet by mouth At Night As Needed for Anxiety or Sleep. One po bid prn, Disp: 30 tablet, Rfl: 0  •  aspirin  MG tablet, Take 325 mg by mouth Every 6 (Six) Hours As Needed., Disp: , Rfl:   •  buPROPion XL (Wellbutrin XL) 150 MG 24 hr tablet, Take 150 mg by mouth Daily., Disp: , Rfl:   •  cetirizine (zyrTEC) 10 MG tablet, Take 10 mg by mouth Daily., Disp: , Rfl:   •  coenzyme Q10 100 MG capsule, Take 100 mg by mouth Daily., Disp: , Rfl:   •  fluticasone (FLONASE) 50 MCG/ACT nasal spray, 2 sprays into the nostril(s) as directed by provider., Disp: , Rfl:   •  hydroCHLOROthiazide (HYDRODIURIL) 25 MG tablet, Take 1 tablet by mouth Daily., Disp: 30 tablet, Rfl: 3  •  L-methylfolate Calcium 15 MG tablet, Take 1 tablet by mouth Daily., Disp: , Rfl:   •  meloxicam (MOBIC) 15 MG tablet, TAKE 1 TABLET BY MOUTH DAILY WITH FOOD, Disp: 60 tablet, Rfl: 0  •  Omega 3-6-9 Fatty Acids (OMEGA 3-6-9 COMPLEX PO), Take  by mouth., Disp: , Rfl:   •  ondansetron ODT (ZOFRAN-ODT) 4 MG disintegrating tablet, Place 1 tablet on the tongue 4 (Four) Times a Day  As Needed for Nausea or Vomiting., Disp: 16 tablet, Rfl: 0  •  promethazine (PHENERGAN) 25 MG tablet, Take 1 tablet by mouth Every 8 (Eight) Hours As Needed for Nausea or Vomiting., Disp: 21 tablet, Rfl: 2  •  propranolol (INDERAL) 10 MG tablet, TAKE 1 TABLET BY MOUTH TWICE DAILY, Disp: 60 tablet, Rfl: 5  •  valACYclovir (VALTREX) 1000 MG tablet, Take 1 tablet by mouth 3 (Three) Times a Day., Disp: 21 tablet, Rfl: 0  •  vitamin C (ASCORBIC ACID) 250 MG tablet, Take 250 mg by mouth Daily., Disp: , Rfl:   •  vitamin D (ERGOCALCIFEROL) 1.25 MG (66123 UT) capsule capsule, Take 50,000 Units by mouth Every 7 (Seven) Days., Disp: , Rfl:   •  Zinc Sulfate (ZINC 15 PO), Take  by mouth., Disp: , Rfl:   •  cyclobenzaprine (FLEXERIL) 5 MG tablet, Take 1 tablet by mouth 3 (Three) Times a Day As Needed for Muscle Spasms., Disp: 20 tablet, Rfl: 0  •  methylPREDNISolone (Medrol) 4 MG dose pack, follow package directions, Disp: 21 tablet, Rfl: 0     PMFSH  The following portions of the patient's history were reviewed and updated as appropriate: allergies, current medications, past family history, past medical history, past social history, past surgical history and problem list.    Review of Systems   Constitutional: Negative for appetite change, fever and unexpected weight change.   HENT: Negative.    Eyes: Negative for pain and visual disturbance.   Respiratory: Negative for chest tightness, shortness of breath and wheezing.    Cardiovascular: Negative for chest pain and palpitations.   Gastrointestinal: Negative for abdominal pain, blood in stool, diarrhea, nausea and vomiting.   Endocrine: Negative.    Genitourinary: Negative for difficulty urinating, flank pain, frequency and urgency.   Musculoskeletal: Positive for back pain, gait problem and myalgias. Negative for joint swelling.   Skin: Negative for color change and rash.   Neurological: Negative for dizziness, tremors, weakness and headaches.   Hematological: Negative for  adenopathy.   Psychiatric/Behavioral: Negative for confusion and decreased concentration.   All other systems reviewed and are negative.      Objective   /90   Pulse 74   Wt 77.8 kg (171 lb 9.6 oz)   SpO2 97%   BMI 30.41 kg/m²     Physical Exam  Vitals and nursing note reviewed.   Constitutional:       General: She is not in acute distress.     Appearance: She is well-developed. She is not diaphoretic.   HENT:      Head: Normocephalic and atraumatic.   Eyes:      General: No scleral icterus.     Conjunctiva/sclera: Conjunctivae normal.      Pupils: Pupils are equal, round, and reactive to light.   Cardiovascular:      Rate and Rhythm: Normal rate and regular rhythm.      Heart sounds: Normal heart sounds. No murmur heard.  No gallop.    Pulmonary:      Effort: Pulmonary effort is normal. No respiratory distress.      Breath sounds: Normal breath sounds. No wheezing or rales.   Chest:      Chest wall: No tenderness.   Abdominal:      Palpations: Abdomen is soft.      Tenderness: There is no abdominal tenderness.   Musculoskeletal:         General: Tenderness present. No deformity.      Cervical back: Normal range of motion and neck supple.   Skin:     General: Skin is warm and dry.      Findings: No rash.   Neurological:      Mental Status: She is alert and oriented to person, place, and time.      Sensory: No sensory deficit.      Motor: No tremor, atrophy or abnormal muscle tone.      Coordination: Coordination normal.   Psychiatric:         Behavior: Behavior normal.         Thought Content: Thought content normal.         Judgment: Judgment normal.         ASSESSMENT/PLAN    Diagnoses and all orders for this visit:    1. Acute left-sided low back pain with left-sided sciatica (Primary)  Comments:  Decadron 4 mg IM in office. Start medrol dose pack tomorrow. Use flexeril prn. Refer to physical therapy.  Orders:  -     methylPREDNISolone (Medrol) 4 MG dose pack; follow package directions  Dispense: 21  tablet; Refill: 0  -     Ambulatory Referral to Physical Therapy Evaluate and treat  -     cyclobenzaprine (FLEXERIL) 5 MG tablet; Take 1 tablet by mouth 3 (Three) Times a Day As Needed for Muscle Spasms.  Dispense: 20 tablet; Refill: 0  -     dexamethasone (DECADRON) injection 4 mg    2. Essential hypertension  Assessment & Plan:  Hypertension is worsening.  Dietary sodium restriction.  Weight loss.  Regular aerobic exercise.  Medication changes per orders.  Ambulatory blood pressure monitoring. Increase hctz to 25 mg daily.  Blood pressure will be reassessed at the next regular appointment.    Orders:  -     hydroCHLOROthiazide (HYDRODIURIL) 25 MG tablet; Take 1 tablet by mouth Daily.  Dispense: 30 tablet; Refill: 3           Return if symptoms worsen or fail to improve, for Next scheduled follow up.

## 2021-10-21 DIAGNOSIS — S46.012D RUPTURE OF LEFT SUPRASPINATUS TENDON, SUBSEQUENT ENCOUNTER: Primary | ICD-10-CM

## 2021-10-21 NOTE — ASSESSMENT & PLAN NOTE
Hypertension is worsening.  Dietary sodium restriction.  Weight loss.  Regular aerobic exercise.  Medication changes per orders.  Ambulatory blood pressure monitoring. Increase hctz to 25 mg daily.  Blood pressure will be reassessed at the next regular appointment.

## 2021-11-17 ENCOUNTER — TELEPHONE (OUTPATIENT)
Dept: ORTHOPEDIC SURGERY | Facility: CLINIC | Age: 51
End: 2021-11-17

## 2021-11-17 NOTE — TELEPHONE ENCOUNTER
Caller: BRENDA LANDIN    Relationship to patient: SELF    Best call back number: [phone]    Type of visit: PROCEDURE    Additional notes:PATIENT IS WANTING TO CANCEL PROCEDURE ON 11/23 AS SHE SAID PHYSICAL THERAPY HAS BEEN HELPING HER AND SHED LIKE TO PUT IT OFF

## 2021-11-21 ENCOUNTER — APPOINTMENT (OUTPATIENT)
Dept: PREADMISSION TESTING | Facility: HOSPITAL | Age: 51
End: 2021-11-21

## 2022-02-23 ENCOUNTER — OFFICE VISIT (OUTPATIENT)
Dept: ORTHOPEDIC SURGERY | Facility: CLINIC | Age: 52
End: 2022-02-23

## 2022-02-23 VITALS
WEIGHT: 171 LBS | SYSTOLIC BLOOD PRESSURE: 130 MMHG | BODY MASS INDEX: 30.3 KG/M2 | DIASTOLIC BLOOD PRESSURE: 95 MMHG | HEIGHT: 63 IN

## 2022-02-23 DIAGNOSIS — S46.012D RUPTURE OF LEFT SUPRASPINATUS TENDON, SUBSEQUENT ENCOUNTER: Primary | ICD-10-CM

## 2022-02-23 PROCEDURE — 99214 OFFICE O/P EST MOD 30 MIN: CPT | Performed by: ORTHOPAEDIC SURGERY

## 2022-02-23 RX ORDER — ASPIRIN 81 MG/1
81 TABLET ORAL DAILY
COMMUNITY

## 2022-02-23 RX ORDER — HYDROCHLOROTHIAZIDE 12.5 MG/1
12.5 TABLET ORAL DAILY
COMMUNITY
Start: 2021-12-21 | End: 2022-02-23

## 2022-02-23 RX ORDER — IBUPROFEN 800 MG/1
800 TABLET ORAL AS NEEDED
COMMUNITY
End: 2022-08-08

## 2022-02-23 NOTE — PROGRESS NOTES
[facility] [place] [facility] Note    Subjective     CC: Follow-up (5 month recheck - Rupture of left supraspinatus tendon)      HPI    Gino Maguire is a 51 y.o. female.  She has worsening left shoulder pain.  We planned on doing surgery last November but she had canceled.  Pain is 6 out of 10.  She has weakness and stiffness.    Review of Systems   Constitutional: Positive for activity change.   HENT: Positive for tinnitus.    Musculoskeletal: Positive for arthralgias, back pain, joint swelling and neck pain.   Allergic/Immunologic: Positive for environmental allergies and food allergies.   Psychiatric/Behavioral: Positive for decreased concentration and sleep disturbance.       ROS:    Constiutional:Pt denies fever, chills, nausea, or vomiting.  MSK:as above      Objective      Past Medical History  Past Medical History:   Diagnosis Date   • Abdominal pain    • Abdominal pain, RUQ     Check cmp. refer to [facility] u/s and gen surg eval d/t cholelithiasis noted on CT of abd/pelvis donein ER 1/16   • Acute bronchitis     Take cefdinir and medrol dose pack as directed. Use otc mucinex. Continue to rest and push fluids. Call or rtc if no better. Gave phenergan w/ codeine cough syrup 5 ml po prn #120ml, no RF per Dr. Cesar   • Acute pharyngitis     Check for monod/t exposure/ Pt will restart flovent as used for esophagitis. If no improvement take the medrol dose pack she was given at least visit   • Acute sinusitis    • Acute upper respiratory infection    • Anxiety    • Body aches    • Cholelithiasis     Reviewed CT of adb/pelvis from ER 1/16 showed cholelithiasis. Will refer for gen surgery eval and GB u/s   • Chronic pain disorder    • Cough    • Eosinophilic esophagitis    • Extremity pain    • Gestational diabetes    • Headache    • Influenza    • Labyrinthitis    • Low back pain    • Lumbar strain    • Lumbosacral disc disease    • Neck pain    • Right hip pain    • Shingles    • Strain of thoracic  "region    • Viral infection    • Viral pharyngitis          Physical Exam  /95   Ht 160 cm (62.99\")   Wt 77.6 kg (171 lb)   BMI 30.30 kg/m²     Body mass index is 30.3 kg/m².    Patient is well nourished and well developed.        Ortho Exam  Left shoulder with painful range of motion but near full passive motion.  Rotator cuff weakness.    Imaging/Labs/EMG Reviewed:  Imaging Results (Last 24 Hours)     ** No results found for the last 24 hours. **      MRI from August shows a small full-thickness cuff tear    Assessment:  1. Rupture of left supraspinatus tendon, subsequent encounter        Plan:  1. The plan is for left arthroscopy with rotator cuff repair.Treatment options and alternatives were discussed with patient.  Surgical risks include but are not limited to pain, bleeding, infection, failure to relieve symptoms, need for further procedures, recurrence of symptoms, damage to healthy adjacent structures, hardware loosening/failure, stiffness, weakness, scar, blood clots/DVT/PE, loss of limb or life. We also discussed the postoperative protocol and expected outcome although no guarantees are possible with surgery. All questions were answered; the patient would like to proceed with surgical intervention.  I discussed the pain and use of the sling postoperatively.  4 to 6 months of rehab.  Follow Up:   Return for 1 week after surgery.      Medical Decision Making  Management Options : Moderate - Decision regarding minor surgery with identified patient  or procedure risk factors        Rock Hackett M.D., [facility]  Orthopedic Surgeon  Fellowship Trained Sports Medicine  [facility]  Orthopedics and Sports Medicine  [address] [place] [address], [address]  [place] [address]    EMR Dragon/Transcription disclaimer:  Much of this encounter note is an electronic transcription of spoken language to printed text. Electronic transcription of spoken language may permit erroneous, or at times, " nonsensical words or phrases to be inadvertently transcribed. Although I have reviewed the note for such errors, some may still exist.

## 2022-03-06 ENCOUNTER — LAB (OUTPATIENT)
Dept: PREADMISSION TESTING | Facility: HOSPITAL | Age: 52
End: 2022-03-06

## 2022-03-06 DIAGNOSIS — S46.012D RUPTURE OF LEFT SUPRASPINATUS TENDON, SUBSEQUENT ENCOUNTER: ICD-10-CM

## 2022-03-06 LAB — SARS-COV-2 RNA PNL SPEC NAA+PROBE: NOT DETECTED

## 2022-03-06 PROCEDURE — C9803 HOPD COVID-19 SPEC COLLECT: HCPCS

## 2022-03-06 PROCEDURE — U0004 COV-19 TEST NON-CDC HGH THRU: HCPCS

## 2022-03-08 ENCOUNTER — OUTSIDE FACILITY SERVICE (OUTPATIENT)
Dept: ORTHOPEDIC SURGERY | Facility: CLINIC | Age: 52
End: 2022-03-08

## 2022-03-08 DIAGNOSIS — Z98.890 S/P LEFT ROTATOR CUFF REPAIR: Primary | ICD-10-CM

## 2022-03-08 PROCEDURE — 29826 SHO ARTHRS SRG DECOMPRESSION: CPT | Performed by: ORTHOPAEDIC SURGERY

## 2022-03-08 PROCEDURE — 29827 SHO ARTHRS SRG RT8TR CUF RPR: CPT | Performed by: ORTHOPAEDIC SURGERY

## 2022-03-08 RX ORDER — OXYCODONE HYDROCHLORIDE AND ACETAMINOPHEN 5; 325 MG/1; MG/1
1 TABLET ORAL EVERY 6 HOURS PRN
Qty: 20 TABLET | Refills: 0 | Status: SHIPPED | OUTPATIENT
Start: 2022-03-08 | End: 2022-04-06

## 2022-03-08 RX ORDER — ONDANSETRON 4 MG/1
4 TABLET, FILM COATED ORAL EVERY 8 HOURS PRN
Qty: 10 TABLET | Refills: 1 | Status: SHIPPED | OUTPATIENT
Start: 2022-03-08 | End: 2022-04-06

## 2022-03-10 ENCOUNTER — TELEPHONE (OUTPATIENT)
Dept: ORTHOPEDIC SURGERY | Facility: CLINIC | Age: 52
End: 2022-03-10

## 2022-03-10 NOTE — TELEPHONE ENCOUNTER
Spoke with patient and gave her the recommendations that TONYA Silver sent. She states she would try the Tylenol due to the Percocet making her ill. She will ice the shoulder as well and take Ibuprofen if needed. She seemed to understand directions and will call back for any additional issues/questions.

## 2022-03-10 NOTE — TELEPHONE ENCOUNTER
Pt called in to say that starting today she is having significant nausea, dry heaving and shoulder pain. Pt had surgery with Dr. Hackett on Tuesday. Pt would like a call back to discuss what she should do.  [phone].

## 2022-03-10 NOTE — TELEPHONE ENCOUNTER
Bettina,    Patient is having bouts of nausea and warm sensation through her body since around 8 this morning.  She says it feels like when CT contrast is injected.  She did have four bowel movements this morning and also took the zofran around 10 am, the nausea seems to have eased.  She is still having the warm sensations.  She still has the pain pump and is not taking her oral pain medication until the pump runs out. I told her that is exactly what we need her to do.  I advised her to keep taking the zofran and a bland diet for the next couple of days to see if the nausea eases. She denies a  Fever or other illness symptoms. Anything else you recommend?    Thank you,    Camacho

## 2022-03-10 NOTE — TELEPHONE ENCOUNTER
At this time I recommend continuing the Zofran to help with the nausea.    Since she does have a pain pump, I would recommend adding Tylenol to her daily regimen up to 1 g or 1000 mg 3 times a day.  If she is taking the Percocet then she needs to be careful with how much Tylenol she is taking.  Maximum Tylenol in a 24-hour period is 3 g or 3000 mg.    Recommend icing the shoulder.

## 2022-03-16 ENCOUNTER — OFFICE VISIT (OUTPATIENT)
Dept: ORTHOPEDIC SURGERY | Facility: CLINIC | Age: 52
End: 2022-03-16

## 2022-03-16 VITALS — TEMPERATURE: 96.9 F

## 2022-03-16 DIAGNOSIS — Z98.890 S/P LEFT ROTATOR CUFF REPAIR: Primary | ICD-10-CM

## 2022-03-16 PROCEDURE — 99024 POSTOP FOLLOW-UP VISIT: CPT | Performed by: ORTHOPAEDIC SURGERY

## 2022-03-16 NOTE — PROGRESS NOTES
Chief Complaint   Patient presents with   • Post-op     Left shoulder arthroscopy 3/8/22             HPI  She is doing well.  Pain pump helped tremendously.      Vitals:    03/16/22 0813   Temp: 96.9 °F (36.1 °C)         Physical Exam:    Her skin portals look good.  Neurologically intact.      ICD-10-CM ICD-9-CM   1. S/P left rotator cuff repair  Z98.890 V45.89       Orders Placed This Encounter   Procedures   • Ambulatory Referral to Physical Therapy POST OP     She will start physical therapy at [address].  [facility] protocol.  Discontinue the sling.        Rock Hackett M.D., [facility]  Orthopedic Surgeon  Fellowship Trained Sports Medicine  [facility]  Orthopedics and Sports Medicine  [address], [address]  [place] [address]      EMR Dragon/Transcription disclaimer:  Much of this encounter note is an electronic transcription of spoken language to printed text. Electronic transcription of spoken language may permit erroneous, or at times, nonsensical words or phrases to be inadvertently transcribed. Although I have reviewed the note for such errors, some may still exist.

## 2022-03-22 ENCOUNTER — TELEPHONE (OUTPATIENT)
Dept: ORTHOPEDIC SURGERY | Facility: CLINIC | Age: 52
End: 2022-03-22

## 2022-03-22 ENCOUNTER — LAB (OUTPATIENT)
Dept: LAB | Facility: HOSPITAL | Age: 52
End: 2022-03-22

## 2022-03-22 ENCOUNTER — OFFICE VISIT (OUTPATIENT)
Dept: INTERNAL MEDICINE | Facility: CLINIC | Age: 52
End: 2022-03-22

## 2022-03-22 VITALS
HEIGHT: 63 IN | BODY MASS INDEX: 31.71 KG/M2 | HEART RATE: 71 BPM | WEIGHT: 179 LBS | DIASTOLIC BLOOD PRESSURE: 78 MMHG | SYSTOLIC BLOOD PRESSURE: 100 MMHG | OXYGEN SATURATION: 98 % | TEMPERATURE: 96.9 F

## 2022-03-22 DIAGNOSIS — I10 ESSENTIAL HYPERTENSION: ICD-10-CM

## 2022-03-22 DIAGNOSIS — R55 SYNCOPE, UNSPECIFIED SYNCOPE TYPE: Primary | ICD-10-CM

## 2022-03-22 DIAGNOSIS — R55 SYNCOPE, UNSPECIFIED SYNCOPE TYPE: ICD-10-CM

## 2022-03-22 LAB
ANION GAP SERPL CALCULATED.3IONS-SCNC: 11.3 MMOL/L (ref 5–15)
BUN SERPL-MCNC: 15 MG/DL (ref 6–20)
BUN/CREAT SERPL: 17.2 (ref 7–25)
CALCIUM SPEC-SCNC: 10.3 MG/DL (ref 8.6–10.5)
CHLORIDE SERPL-SCNC: 100 MMOL/L (ref 98–107)
CO2 SERPL-SCNC: 25.7 MMOL/L (ref 22–29)
CREAT SERPL-MCNC: 0.87 MG/DL (ref 0.57–1)
EGFRCR SERPLBLD CKD-EPI 2021: 79.8 ML/MIN/1.73
GLUCOSE SERPL-MCNC: 99 MG/DL (ref 65–99)
POTASSIUM SERPL-SCNC: 4.6 MMOL/L (ref 3.5–5.2)
SODIUM SERPL-SCNC: 137 MMOL/L (ref 136–145)

## 2022-03-22 PROCEDURE — 93000 ELECTROCARDIOGRAM COMPLETE: CPT | Performed by: STUDENT IN AN ORGANIZED HEALTH CARE EDUCATION/TRAINING PROGRAM

## 2022-03-22 PROCEDURE — 80048 BASIC METABOLIC PNL TOTAL CA: CPT

## 2022-03-22 PROCEDURE — 36415 COLL VENOUS BLD VENIPUNCTURE: CPT

## 2022-03-22 PROCEDURE — 99214 OFFICE O/P EST MOD 30 MIN: CPT | Performed by: STUDENT IN AN ORGANIZED HEALTH CARE EDUCATION/TRAINING PROGRAM

## 2022-03-22 NOTE — PROGRESS NOTES
"              Internal Medicine Acute Visit     Patient Name: Gino Maguire  : 1970   MRN: [id number]     Chief Complaint:    Chief Complaint   Patient presents with   • Follow-up     Passed out in the in morning       History of Present Illness: Gino Maguire is a 51 y.o. female who presents for one episode of syncope. She had a surgery on her shoulder 2 weeks ago. This morning, she got out of bed to go to the restroom when she began feeling lightheaded, nauseated, flushed, diaphoretic. When she got to the bathroom, she felt like vomiting so she lifted the trashcan but could only dry heave. Next thing she knew, she was waking up on the floor. She called for her . Estimates that she may have only been out for less than 30 seconds if that. She had no preceding chest pain. She has no known underlying cardiac disorder. She takes HCTZ for HTN which was increased from 12.5 mg daily to 25 mg daily at her last appointment. She missed her follow up appointment to recheck BP because of her shoulder surgery. She is also on propranolol for anxiety. She took propranolol this morning but not HCTZ. Her son-in-law (or possibly son) is an EMT who came by. Her systolic blood pressure after the event was 118.     Subjective     I have reviewed and the following portions of the patient's history were updated as appropriate: past family history, past medical history, past social history, past surgical history and problem list.    Allergies:   Allergies   Allergen Reactions   • Maxalt [Rizatriptan] Anaphylaxis   • Sulfa Antibiotics Rash   • Sumatriptan Anaphylaxis   • Zolmitriptan Anaphylaxis   • Labetalol Rash       Objective     Physical Exam:  Vital Signs:   Vitals:    22 1003   BP: 100/78   Pulse: 71   Temp: 96.9 °F (36.1 °C)   SpO2: 98%   Weight: 81.2 kg (179 lb)   Height: 160 cm (63\")   PainSc:   4     Body mass index is 31.71 kg/m².    Physical Exam  Vitals and nursing note reviewed. "   Constitutional:       Appearance: Normal appearance.   Cardiovascular:      Rate and Rhythm: Normal rate and regular rhythm.      Heart sounds: Normal heart sounds.   Pulmonary:      Effort: Pulmonary effort is normal.      Breath sounds: Normal breath sounds. No wheezing, rhonchi or rales.   Skin:     General: Skin is warm and dry.   Neurological:      General: No focal deficit present.      Mental Status: She is alert and oriented to person, place, and time.      Cranial Nerves: No cranial nerve deficit.      Sensory: No sensory deficit.      Motor: No weakness.   Psychiatric:         Mood and Affect: Mood normal.         Behavior: Behavior normal.       Assessment / Plan      Assessment/Plan:   Diagnoses and all orders for this visit:    1. Syncope, unspecified syncope type (Primary)  Unlikely to be due to cardiac cause. EKG was normal. No murmurs hear d on auscultation. Story is consistent with orthostatic hypotesnsion. BP is usually elevated but today is 100/78. She has not taken HCTZ today. HR noted to be 66 and she has taken her propranolol. BP low without ability for reflex tachycardia in repsonse to low BP. Syncope occurred after rising from lying position she had prodrome of lightheadedness, clamminess, and nausea. No CP. Recommended holding both propranolol and HCTZ. Drink plenty of fluid. Check BP at home. When consistently >130/90, can restart HCTZ but conitnue holding propranolol. Follow up in 1 week.   -     Basic metabolic panel; Future  -     ECG 12 Lead    2. Essential hypertension  Holding HCTZ due to hypotension. Checking BMP for electrolytes.        ECG 12 Lead    Date/Time: 3/22/2022 11:22 AM  Performed by: Sofy Mancini MD  Authorized by: Sofy Mancini MD   Comparison: compared with previous ECG from 7/6/2021  Rhythm: sinus rhythm  Rate: normal  BPM: 66  Conduction: conduction normal  ST Segments: ST segments normal  T Waves: T waves normal  Other: no other findings    Clinical  impression: normal ECG            Follow Up:   Return in about 1 week (around 3/29/2022) for Recheck.    Time:   I spent approximately 25 minutes providing clinical care for this patient; including review of patient's chart and provider documentation, face to face time spent with patient in examination room (obtaining history, performing physical exam, discussing diagnosis and management options), placing orders, and completing patient documentation.     Sofy Mancini MD  [facility]

## 2022-03-22 NOTE — TELEPHONE ENCOUNTER
Patient needs to be taking acetaminophen/Tylenol 1000 mg 3 times a day and an anti-inflammatory.    Continue with ice, heat.  Additionally at her initial postop Dr. Hackett did place a PT referral.  She needs to make sure she gets therapy started.

## 2022-03-22 NOTE — TELEPHONE ENCOUNTER
"I spoke with the patient and advised per PHONG Dunbar, \"Patient needs to be taking acetaminophen/Tylenol 1000 mg 3 times a day and an anti-inflammatory.     Continue with ice, heat.  Additionally at her initial postop Dr. Hackett did place a PT referral.  She needs to make sure she gets therapy started.\" She verbalized understanding and I also let her know that she needs to stay hydrated as well.     Nehal Rojas, RMA   "

## 2022-03-22 NOTE — TELEPHONE ENCOUNTER
Patient states that she is having some discomfort and muscle spasm in her left shoulder ( the shoulder that she had the surgery) and she would like to know what she needs to do about this?     The patient states that she has been doing ice/heat but it's not helping her at all.    FYI--The patient also states that she went to see her primary care doctor today in regards to her passing out from dehydration.    Please advise and call pt back at [phone].

## 2022-03-23 ENCOUNTER — HOSPITAL ENCOUNTER (OUTPATIENT)
Dept: PHYSICAL THERAPY | Facility: HOSPITAL | Age: 52
Setting detail: THERAPIES SERIES
Discharge: HOME | End: 2022-03-23

## 2022-03-23 DIAGNOSIS — Z98.890 S/P RIGHT ROTATOR CUFF REPAIR: Primary | ICD-10-CM

## 2022-03-23 PROCEDURE — 97161 PT EVAL LOW COMPLEX 20 MIN: CPT | Performed by: GENERAL ACUTE CARE HOSPITAL

## 2022-03-23 NOTE — THERAPY EVALUATION
Outpatient Physical Therapy Ortho Initial Evaluation  [facility]     Patient Name: Gino Maguire  : 1970  MRN: [id number]  Today's Date: 3/23/2022      Visit Date: 2022    Patient Active Problem List   Diagnosis   • Right upper quadrant pain   • Anxiety   • Arthritis   • Chronic antral gastritis   • Eosinophilic esophagitis   • Essential hypertension   • Labyrinthitis   • Right-sided low back pain with right-sided sciatica   • Prolonged depressive adjustment reaction   • Arthralgia of hip   • Herpes zoster   • Strain of thoracic region   • Grief reaction   • Eyelid gland swelling   • Gestational diabetes   • Pain of right heel   • Right flank pain   • Displacement of lumbar intervertebral disc   • Lumbar foraminal stenosis   • Greater trochanteric bursitis of both hips   • Bilateral primary osteoarthritis of knee   • Myofascial pain   • Physical deconditioning   • Insomnia   • Cervical spondylosis without myelopathy   • RLQ abdominal pain   • Great toe pain, right   • Myalgia   • Rash and other nonspecific skin eruption        Past Medical History:   Diagnosis Date   • Abdominal pain    • Abdominal pain, RUQ     Check cmp. refer to [facility] u/s and gen surg eval d/t cholelithiasis noted on CT of abd/pelvis donein ER    • Acute bronchitis     Take cefdinir and medrol dose pack as directed. Use otc mucinex. Continue to rest and push fluids. Call or rtc if no better. Gave phenergan w/ codeine cough syrup 5 ml po prn #120ml, no RF per Dr. Cesar   • Acute pharyngitis     Check for monod/t exposure/ Pt will restart flovent as used for esophagitis. If no improvement take the medrol dose pack she was given at least visit   • Acute sinusitis    • Acute upper respiratory infection    • Anxiety    • Body aches    • Cholelithiasis     Reviewed CT of adb/pelvis from ER  showed cholelithiasis. Will refer for gen surgery eval and GB u/s   • Chronic pain disorder    • Cough    • Eosinophilic esophagitis     • Extremity pain    • Gestational diabetes    • Headache    • Influenza    • Labyrinthitis    • Low back pain    • Lumbar strain    • Lumbosacral disc disease    • Neck pain    • Right hip pain    • Shingles    • Strain of thoracic region    • Viral infection    • Viral pharyngitis         Past Surgical History:   Procedure Laterality Date   • BREAST BIOPSY Left 10/2018    Benign STEREO    •  SECTION      97, 00, 04, 12   • DILATATION AND CURETTAGE     • ENDOSCOPY     • OOPHORECTOMY Right 2017   • SHOULDER SURGERY  2022   • WISDOM TOOTH EXTRACTION         Visit Dx:     ICD-10-CM ICD-9-CM   1. S/P right rotator cuff repair  Z98.890 V45.89          Patient History     Row Name 22 0815             History    Chief Complaint Difficulty with daily activities;Fatigue/poor endurance;Joint stiffness;Muscle tenderness;Pain;Muscle weakness  -      Type of Pain Shoulder pain  -      Date Current Problem(s) Began 22  -      Brief Description of Current Complaint Patient recently had a left rotator cuff repair with a regeneton patch placement. Overall, she states that she is doing well to this point with mild discomfort in the left shoulder.  She notes that she continues to wear the sling when she sleeps and when she is out in public.  However, she no longer wears the sling when walking around in her home.  -      Previous treatment for THIS PROBLEM Surgery  -      Surgery Date: 22  -      Patient/Caregiver Goals Relieve pain;Return to prior level of function;Improve mobility;Improve strength;Know what to do to help the symptoms  -      Hand Dominance right-handed  -      Occupation/sports/leisure activities [employer]   -      Surgery/Hospitalization Surgery (2022)  -              Pain     Pain Location Shoulder  -      Pain at Present 2  -HP      Pain at Best 1  -HP      Pain at Worst 7  -HP      Pain Frequency Constant/continuous   -HP      Pain Description Aching;Discomfort;Sharp;Shooting;Sore;Tightness  -      What Performance Factors Make the Current Problem(s) WORSE? use of the L shoulder  -      What Performance Factors Make the Current Problem(s) BETTER? Rest  -HP      Pain Comments Pain is noted along the lateral portion of the L shoulder the greatest, but soreness noted throughout  -HP      Is your sleep disturbed? Yes  -              Fall Risk Assessment    Any falls in the past year: Yes  -      Factors that contributed to the fall: Fatigue  -              Daily Activities    Primary Language English  -      Pt Participated in POC and Goals Yes  -HP            User Key  (r) = Recorded By, (t) = Taken By, (c) = Cosigned By    Initials Name Provider Type     Vidal Curiel, PT Physical Therapist                 PT Ortho     Row Name 03/23/22 0815       Precautions and Contraindications    Precautions/Limitations shoulder precautions  -       Posture/Observations    Posture/Observations Comments Mild TTP/soreness along the left shoulder specifically at incisional marks.  -HP       Quarter Clearing    Quarter Clearing Upper Quarter Clearing  -       Sensory Screen for Light Touch- Upper Quarter Clearing    C4 (posterior shoulder) Intact  -HP    C5 (lateral upper arm) Intact  -HP    C6 (tip of thumb) Intact  -HP    C7 (tip of 3rd finger) Intact  -HP    C8 (tip of 5th finger) Intact  -HP    T1 (medial lower arm) Intact  -HP       General ROM    LT Upper Ext Lt Shoulder Extension;Lt Shoulder Flexion;Lt Elbow Extension/Flexion  -       Left Upper Ext    Lt Shoulder Extension AROM 43 degrees  -HP    Lt Shoulder Flexion AROM 90 (AROM); 100 (AAROM)  -HP    Lt Shoulder Flexion PROM 120  -HP       MMT (Manual Muscle Testing)    General MMT Comments Deffered formal MMT of shoulder; grossly 5/5 MMT below the shoulder  -          User Key  (r) = Recorded By, (t) = Taken By, (c) = Cosigned By    Initials Name Provider Type      Vidal Cureil, PT Physical Therapist                            Therapy Education  Education Details: HEP included: Plantarflexion, table slide in flexion, table slides scaption, pendulums, scapular retraction  Given: HEP, Symptoms/condition management, Pain management, Posture/body mechanics  Program: New  How Provided: Verbal, Demonstration, Written  Provided to: Patient  Level of Understanding: Teach back education performed, Verbalized, Demonstrated      PT OP Goals     Row Name 03/23/22 0815          PT Short Term Goals    STG Date to Achieve 04/20/22  -HP     STG 1 Patient to report improvement of symptoms by 50% or greater.  -HP     STG 1 Progress New  -HP     STG 2 Patient to report adherence to HEP.  -HP     STG 2 Progress New  -HP     STG 3 Patient to report decrease in QuickDASH score to less than or equal to 50.  -HP     STG 3 Progress New  -HP     STG 4 Patient to demonstrate active range of motion of the left shoulder in flexion 220 degrees without pain/symptoms.  -HP     STG 4 Progress New  -            Long Term Goals    LTG Date to Achieve 05/18/22  -HP     LTG 1 Patient to report improvement of symptoms by 75% or greater.  -HP     LTG 1 Progress New  -HP     LTG 2 Patient to report independence with HEP.  -HP     LTG 2 Progress New  -HP     LTG 3 Patient to report decrease in QuickDASH score to less than or equal to 20.  -HP     LTG 3 Progress New  -HP     LTG 4 Patient to demonstrate full active range of motion in all planes of motion of the left shoulder without pain/symptoms.  -HP     LTG 4 Progress New  [facility]            Time Calculation    PT Goal Re-Cert Due Date 06/21/22  -           User Key  (r) = Recorded By, (t) = Taken By, (c) = Cosigned By    Initials Name Provider Type     Vidal Curiel, PT Physical Therapist                 PT Assessment/Plan     Row Name 03/23/22 0815          PT Assessment    Functional Limitations Limitation in home management;Limitations in community  activities;Limitations in functional capacity and performance;Performance in leisure activities;Performance in self-care ADL;Performance in work activities  -     Impairments Endurance;Joint mobility;Motor function;Muscle strength;Pain;Posture;Range of motion  -     Assessment Comments Patient is a 51-year-old female that presents with a low complexity and evolving case of left rotator cuff repair.  On 03/08/2022 patient had a left rotator cuff repair with a Regeneton patch placement.  Patient notes that recently she had a episode of dehydration which led to her passing out.  However, she states that she is feeling better today.  Overall she presents with limitations in her active range of motion.  Time was spent educating patient on discontinuing use of her sling.  To this point, patient is doing very well and will continue to benefit from further skilled therapy with the use of a regeneton protocol.  -     Please refer to paper survey for additional self-reported information Yes  -HP     Rehab Potential Good  -HP     Patient/caregiver participated in establishment of treatment plan and goals Yes  -HP     Patient would benefit from skilled therapy intervention Yes  -HP            PT Plan    PT Frequency 2x/week  -     Predicted Duration of Therapy Intervention (PT) 18-20 visits  -     Planned CPT's? PT EVAL LOW COMPLEXITY: 22620;PT THER PROC EA 15 MIN: 25214;PT MANUAL THERAPY EA 15 MIN: 85722;PT THER ACT EA 15 MIN: 37026;PT NEUROMUSC RE-EDUCATION EA 15 MIN: 20313  -     Physical Therapy Interventions (Optional Details) gross motor skills;fine motor skills;home exercise program;joint mobilization;manual therapy techniques;modalities;neuromuscular re-education;patient/family education;postural re-education;ROM (Range of Motion);strengthening;stretching  -     PT Plan Comments From further skilled therapy with a focus on improving active range of motion, improving strength, and restoring functional  mobility to prior level of function.  -           User Key  (r) = Recorded By, (t) = Taken By, (c) = Cosigned By    Initials Name Provider Type     Vidal Curiel, PT Physical Therapist                                    Outcome Measure Options: Quick DASH  Quick DASH  Open a tight or new jar.: Unable  Do heavy household chores (e.g., wash walls, wash floors): Unable  Carry a shopping bag or briefcase: Unable  Wash your back: Unable  Use a knife to cut food: Unable  Recreational activities in which you take some force or impact through your arm, should or hand (e.g. golf, hammering, tennis, etc.): Unable  During the past week, to what extent has your arm, shoulder, or hand problem interfered with your normal social activites with family, friends, neighbors or groups?: Quite a bit  During the past week, were you limited in your work or other regular daily activities as a result of your arm, shoulder or hand problem?: Very limited  Arm, Shoulder, or hand pain: Moderate  Tingling (pins and needles) in your arm, shoulder, or hand: None  During the past week, how much difficulty have you had sleeping because of the pain in your arm, shoulder or hand?: Moderate Difficiculty  Number of Questions Answered: 11  Quick DASH Score: 77.27         Time Calculation:     Start Time: 0815  Untimed Charges  PT Eval/Re-eval Minutes: 60  Total Minutes  Untimed Charges Total Minutes: 60   Total Minutes: 60     Therapy Charges for Today     Code Description Service Date Service Provider Modifiers Qty    59680941142 HC PT EVAL LOW COMPLEXITY 4 3/23/2022 Vidal Curiel, PT GP 1          PT G-Codes  Outcome Measure Options: Quick DASH  Quick DASH Score: 77.27         Vidal Curiel PT  3/23/2022

## 2022-03-29 ENCOUNTER — LAB (OUTPATIENT)
Dept: LAB | Facility: HOSPITAL | Age: 52
End: 2022-03-29

## 2022-03-29 ENCOUNTER — OFFICE VISIT (OUTPATIENT)
Dept: INTERNAL MEDICINE | Facility: CLINIC | Age: 52
End: 2022-03-29

## 2022-03-29 VITALS
WEIGHT: 181 LBS | BODY MASS INDEX: 32.07 KG/M2 | HEIGHT: 63 IN | SYSTOLIC BLOOD PRESSURE: 126 MMHG | DIASTOLIC BLOOD PRESSURE: 80 MMHG | TEMPERATURE: 96.8 F | OXYGEN SATURATION: 99 % | HEART RATE: 85 BPM

## 2022-03-29 DIAGNOSIS — I95.1 ORTHOSTATIC HYPOTENSION: Primary | ICD-10-CM

## 2022-03-29 DIAGNOSIS — E53.8 VITAMIN B12 DEFICIENCY: ICD-10-CM

## 2022-03-29 LAB — VIT B12 BLD-MCNC: 777 PG/ML (ref 211–946)

## 2022-03-29 PROCEDURE — 36415 COLL VENOUS BLD VENIPUNCTURE: CPT

## 2022-03-29 PROCEDURE — 82607 VITAMIN B-12: CPT

## 2022-03-29 PROCEDURE — 99214 OFFICE O/P EST MOD 30 MIN: CPT | Performed by: STUDENT IN AN ORGANIZED HEALTH CARE EDUCATION/TRAINING PROGRAM

## 2022-03-29 NOTE — PROGRESS NOTES
Internal Medicine Established Office Visit      Date: 2022     Patient Name: Gino Maguire  : 1970   MRN: [id number]     Chief Complaint:    Chief Complaint   Patient presents with   • Follow-up   • Hypertension   • Shoulder Pain     Left arm,Cuff surgery       History of Present Illness: Gino Maguire is a 51 y.o. female who presents to clinic today for follow up. She was seen last week after syncopal episode. She has been checking her BP daily and is holding medication if <130/80 which has been working well for her. She has had no further syncopal spells or pre-syncope. Shoulder pain is greatly improved.     Subjective     I have reviewed and the following portions of the patient's history were updated as appropriate: past family history, past medical history, past social history, past surgical history and problem list.     Medications:     Current Outpatient Medications:   •  Acetylcysteine (NAC) capsule capsule, Take  by mouth., Disp: , Rfl:   •  albuterol sulfate  (90 Base) MCG/ACT inhaler, Inhale 2 puffs Every 4 (Four) Hours As Needed for Wheezing or Shortness of Air., Disp: 18 g, Rfl: 11  •  ALPRAZolam (XANAX) 0.25 MG tablet, Take 1 tablet by mouth At Night As Needed for Anxiety or Sleep. One po bid prn, Disp: 30 tablet, Rfl: 0  •  aspirin 81 MG EC tablet, Take 81 mg by mouth Daily., Disp: , Rfl:   •  buPROPion XL (WELLBUTRIN XL) 150 MG 24 hr tablet, Take 150 mg by mouth Daily., Disp: , Rfl:   •  cetirizine (zyrTEC) 10 MG tablet, Take 10 mg by mouth Daily., Disp: , Rfl:   •  coenzyme Q10 100 MG capsule, Take 100 mg by mouth Daily., Disp: , Rfl:   •  cyclobenzaprine (FLEXERIL) 5 MG tablet, Take 1 tablet by mouth 3 (Three) Times a Day As Needed for Muscle Spasms., Disp: 20 tablet, Rfl: 0  •  fluticasone (FLONASE) 50 MCG/ACT nasal spray, 2 sprays into the nostril(s) as directed by provider., Disp: , Rfl:   •  hydroCHLOROthiazide (HYDRODIURIL) 25 MG tablet, Take 1 tablet by  "mouth Daily., Disp: 30 tablet, Rfl: 3  •  ibuprofen (ADVIL,MOTRIN) 800 MG tablet, Take 800 mg by mouth As Needed for Mild Pain ., Disp: , Rfl:   •  L-methylfolate Calcium 15 MG tablet, Take 1 tablet by mouth Daily., Disp: , Rfl:   •  meloxicam (MOBIC) 15 MG tablet, TAKE 1 TABLET BY MOUTH DAILY WITH FOOD, Disp: 60 tablet, Rfl: 0  •  Omega 3-6-9 Fatty Acids (OMEGA 3-6-9 COMPLEX PO), Take  by mouth., Disp: , Rfl:   •  ondansetron (Zofran) 4 MG tablet, Take 1 tablet by mouth Every 8 (Eight) Hours As Needed for Nausea., Disp: 10 tablet, Rfl: 1  •  oxyCODONE-acetaminophen (PERCOCET) 5-325 MG per tablet, Take 1 tablet by mouth Every 6 (Six) Hours As Needed for Severe Pain ., Disp: 20 tablet, Rfl: 0  •  promethazine (PHENERGAN) 25 MG tablet, Take 1 tablet by mouth Every 8 (Eight) Hours As Needed for Nausea or Vomiting., Disp: 21 tablet, Rfl: 2  •  propranolol (INDERAL) 10 MG tablet, TAKE 1 TABLET BY MOUTH TWICE DAILY, Disp: 60 tablet, Rfl: 5  •  Quercetin 250 MG tablet, Take  by mouth 2 (Two) Times a Day., Disp: , Rfl:   •  vitamin C (ASCORBIC ACID) 250 MG tablet, Take 250 mg by mouth Daily., Disp: , Rfl:   •  vitamin D (ERGOCALCIFEROL) 1.25 MG (13878 UT) capsule capsule, Take 50,000 Units by mouth Every 7 (Seven) Days., Disp: , Rfl:   •  Zinc Sulfate (ZINC 15 PO), Take  by mouth., Disp: , Rfl:     Allergies:   Allergies   Allergen Reactions   • Maxalt [Rizatriptan] Anaphylaxis   • Sulfa Antibiotics Rash   • Sumatriptan Anaphylaxis   • Zolmitriptan Anaphylaxis   • Labetalol Rash       Objective     Physical Exam:   Vital Signs:   Vitals:    03/29/22 0941   BP: 126/80   Pulse: 85   Temp: 96.8 °F (36 °C)   SpO2: 99%   Weight: 82.1 kg (181 lb)   Height: 160 cm (63\")   PainSc:   2     Body mass index is 32.06 kg/m².     Physical Exam  Vitals and nursing note reviewed.   Constitutional:       General: She is not in acute distress.     Appearance: Normal appearance.   HENT:      Head: Normocephalic and atraumatic. "   Cardiovascular:      Rate and Rhythm: Normal rate.   Pulmonary:      Effort: Pulmonary effort is normal. No respiratory distress.   Skin:     General: Skin is warm and dry.      Comments: Incisions are well healed. One with thicker tissue.    Neurological:      Mental Status: She is alert.   Psychiatric:         Mood and Affect: Mood normal.         Behavior: Behavior normal.          Assessment / Plan      Assessment/Plan:   Diagnoses and all orders for this visit:    1. Orthostatic hypotension (Primary)  Resolved. Likely combination of things including being post-op from shoulder surgery, hypovolemia from decreased PO intake, pain. Now taking BP medication daily unless BP is noted to be <130/80. Repeats BP in the afternoon. Continue HCTZ 25 mg daily.     2. Vitamin B12 deficiency  -     Vitamin B12; Future    Follow Up:   Return for Next scheduled follow up.    Time:  I spent approximately 15 minutes providing clinical care for this patient; including review of patient's chart and provider documentation, face to face time spent with patient in examination room (obtaining history, performing physical exam, discussing diagnosis and management options), placing orders, and completing patient documentation.     Sofy Mancini MD  [facility]

## 2022-04-01 ENCOUNTER — HOSPITAL ENCOUNTER (OUTPATIENT)
Dept: PHYSICAL THERAPY | Facility: HOSPITAL | Age: 52
Setting detail: THERAPIES SERIES
Discharge: HOME | End: 2022-04-01

## 2022-04-01 DIAGNOSIS — Z98.890 S/P RIGHT ROTATOR CUFF REPAIR: Primary | ICD-10-CM

## 2022-04-01 PROCEDURE — 97110 THERAPEUTIC EXERCISES: CPT | Performed by: GENERAL ACUTE CARE HOSPITAL

## 2022-04-01 NOTE — THERAPY TREATMENT NOTE
Outpatient Physical Therapy Ortho Treatment Note   [facility]     Patient Name: Gino Maguire  : 1970  MRN: [id number]  Today's Date: 2022      Visit Date: 2022    Visit Dx:    ICD-10-CM ICD-9-CM   1. S/P right rotator cuff repair  Z98.890 V45.89       Patient Active Problem List   Diagnosis   • Right upper quadrant pain   • Anxiety   • Arthritis   • Chronic antral gastritis   • Eosinophilic esophagitis   • Essential hypertension   • Labyrinthitis   • Right-sided low back pain with right-sided sciatica   • Prolonged depressive adjustment reaction   • Arthralgia of hip   • Herpes zoster   • Strain of thoracic region   • Grief reaction   • Eyelid gland swelling   • Gestational diabetes   • Pain of right heel   • Right flank pain   • Displacement of lumbar intervertebral disc   • Lumbar foraminal stenosis   • Greater trochanteric bursitis of both hips   • Bilateral primary osteoarthritis of knee   • Myofascial pain   • Physical deconditioning   • Insomnia   • Cervical spondylosis without myelopathy   • RLQ abdominal pain   • Great toe pain, right   • Myalgia   • Rash and other nonspecific skin eruption        Past Medical History:   Diagnosis Date   • Abdominal pain    • Abdominal pain, RUQ     Check cmp. refer to [facility] u/s and gen surg eval d/t cholelithiasis noted on CT of abd/pelvis donein ER    • Acute bronchitis     Take cefdinir and medrol dose pack as directed. Use otc mucinex. Continue to rest and push fluids. Call or rtc if no better. Gave phenergan w/ codeine cough syrup 5 ml po prn #120ml, no RF per Dr. Cesar   • Acute pharyngitis     Check for monod/t exposure/ Pt will restart flovent as used for esophagitis. If no improvement take the medrol dose pack she was given at least visit   • Acute sinusitis    • Acute upper respiratory infection    • Anxiety    • Body aches    • Cholelithiasis     Reviewed CT of adb/pelvis from ER  showed cholelithiasis. Will refer for gen surgery  eval and GB u/s   • Chronic pain disorder    • Cough    • Eosinophilic esophagitis    • Extremity pain    • Gestational diabetes    • Headache    • Influenza    • Labyrinthitis    • Low back pain    • Lumbar strain    • Lumbosacral disc disease    • Neck pain    • Right hip pain    • Shingles    • Strain of thoracic region    • Viral infection    • Viral pharyngitis         Past Surgical History:   Procedure Laterality Date   • BREAST BIOPSY Left 10/2018    Benign STEREO    •  SECTION      97, 00, 04, 12   • DILATATION AND CURETTAGE     • ENDOSCOPY     • OOPHORECTOMY Right 2017   • ROTATOR CUFF REPAIR Left    • SHOULDER SURGERY  2022   • WISDOM TOOTH EXTRACTION                          PT Assessment/Plan     Row Name 22 0735          PT Assessment    Assessment Comments Pt did well with today's session. Compared to the start of her session, she demonstrated improvement in her AAROM of the L shoulder. She is doing well to this point.  -HP            PT Plan    PT Plan Comments Continue per POC  -HP           User Key  (r) = Recorded By, (t) = Taken By, (c) = Cosigned By    Initials Name Provider Type    HP Vidal Curiel, PT Physical Therapist                   OP Exercises     Row Name 22 8103             Subjective Comments    Subjective Comments Pt states that she is concerned on her inability to perform her HEP  -HP              Subjective Pain    Able to rate subjective pain? yes  -HP      Pre-Treatment Pain Level 2  -HP              Total Minutes    77602 - PT Therapeutic Exercise Minutes 40  -HP              Exercise 1    Exercise Name 1 Pulley flexion  -HP      Time 1 8 min  -HP              Exercise 2    Exercise Name 2 UBE  -HP      Time 2 5 min fwd  -HP      Additional Comments L0  -HP              Exercise 3    Exercise Name 3 Table slides in flexion, scaption, cw circles, ccw cirlces  -HP      Reps 3 25 each  -HP              Exercise 4    Exercise Name  4 Supine cane: flexion, chest press, chest press +, ER  -HP      Reps 4 25 each  -            User Key  (r) = Recorded By, (t) = Taken By, (c) = Cosigned By    Initials Name Provider Type     Vidal Curiel, PT Physical Therapist                                                Time Calculation:   Start Time: 0730  Timed Charges  30443 - PT Therapeutic Exercise Minutes: 40  Total Minutes  Timed Charges Total Minutes: 40   Total Minutes: 40  Therapy Charges for Today     Code Description Service Date Service Provider Modifiers Qty    [id number]  PT THER PROC EA 15 MIN 4/1/2022 Vidal Curiel, PT GP 3                    Vidal Curiel, NEYMAR  4/1/2022

## 2022-04-05 ENCOUNTER — HOSPITAL ENCOUNTER (OUTPATIENT)
Dept: PHYSICAL THERAPY | Facility: HOSPITAL | Age: 52
Setting detail: THERAPIES SERIES
Discharge: HOME | End: 2022-04-05

## 2022-04-05 DIAGNOSIS — Z98.890 S/P RIGHT ROTATOR CUFF REPAIR: Primary | ICD-10-CM

## 2022-04-05 PROCEDURE — 97110 THERAPEUTIC EXERCISES: CPT | Performed by: GENERAL ACUTE CARE HOSPITAL

## 2022-04-05 NOTE — THERAPY TREATMENT NOTE
Outpatient Physical Therapy Ortho Treatment Note   Manish     Patient Name: Gino Maguire  : 1970  MRN: [id number]  Today's Date: 2022      Visit Date: 2022    Visit Dx:    ICD-10-CM ICD-9-CM   1. S/P right rotator cuff repair  Z98.890 V45.89       Patient Active Problem List   Diagnosis   • Right upper quadrant pain   • Anxiety   • Arthritis   • Chronic antral gastritis   • Eosinophilic esophagitis   • Essential hypertension   • Labyrinthitis   • Right-sided low back pain with right-sided sciatica   • Prolonged depressive adjustment reaction   • Arthralgia of hip   • Herpes zoster   • Strain of thoracic region   • Grief reaction   • Eyelid gland swelling   • Gestational diabetes   • Pain of right heel   • Right flank pain   • Displacement of lumbar intervertebral disc   • Lumbar foraminal stenosis   • Greater trochanteric bursitis of both hips   • Bilateral primary osteoarthritis of knee   • Myofascial pain   • Physical deconditioning   • Insomnia   • Cervical spondylosis without myelopathy   • RLQ abdominal pain   • Great toe pain, right   • Myalgia   • Rash and other nonspecific skin eruption        Past Medical History:   Diagnosis Date   • Abdominal pain    • Abdominal pain, RUQ     Check cmp. refer to [facility] u/s and gen surg eval d/t cholelithiasis noted on CT of abd/pelvis donein ER    • Acute bronchitis     Take cefdinir and medrol dose pack as directed. Use otc mucinex. Continue to rest and push fluids. Call or rtc if no better. Gave phenergan w/ codeine cough syrup 5 ml po prn #120ml, no RF per Dr. Cesar   • Acute pharyngitis     Check for monod/t exposure/ Pt will restart flovent as used for esophagitis. If no improvement take the medrol dose pack she was given at least visit   • Acute sinusitis    • Acute upper respiratory infection    • Anxiety    • Body aches    • Cholelithiasis     Reviewed CT of adb/pelvis from ER  showed cholelithiasis. Will refer for gen surgery  eval and [facility] u/s   • Chronic pain disorder    • Cough    • Eosinophilic esophagitis    • Extremity pain    • Gestational diabetes    • Headache    • Influenza    • Labyrinthitis    • Low back pain    • Lumbar strain    • Lumbosacral disc disease    • Neck pain    • Right hip pain    • Shingles    • Strain of thoracic region    • Viral infection    • Viral pharyngitis         Past Surgical History:   Procedure Laterality Date   • BREAST BIOPSY Left 10/2018    Benign STEREO    •  SECTION      97, 00, 04, 12   • DILATATION AND CURETTAGE     • ENDOSCOPY     • OOPHORECTOMY Right 2017   • ROTATOR CUFF REPAIR Left    • SHOULDER SURGERY  2022   • WISDOM TOOTH EXTRACTION                          PT Assessment/Plan     Row Name 22          PT Assessment    Assessment Comments Patient displays improvement in her active range of motion of the left shoulder compared to her initial evaluation.  She also shows improvements in her tolerance to exercises without increased pain/symptoms in the left shoulder.  However, she is still limited in obtaining full active range of motion but has been progressing well with therapy to this point  -HP            PT Plan    PT Plan Comments Continue per plan of care and progressing patient as necessary.  -HP           User Key  (r) = Recorded By, (t) = Taken By, (c) = Cosigned By    Initials Name Provider Type    HP Vidal Curiel, PT Physical Therapist                   OP Exercises     Row Name 22             Subjective Comments    Subjective Comments Pt states that she feels like she is doing much better  -HP              Subjective Pain    Able to rate subjective pain? yes  -HP      Pre-Treatment Pain Level 2  -HP      Post-Treatment Pain Level 1  -HP              Total Minutes    69652 - PT Therapeutic Exercise Minutes 40  -HP              Exercise 1    Exercise Name 1 UBE  -HP      Time 1 3 min fwd and 2  min bwd  -HP       Additional Comments L5  -HP              Exercise 2    Exercise Name 2 Standing pulley in flexion  -HP      Reps 2 20  -HP              Exercise 3    Exercise Name 3 Standing table incline slide  -HP      Reps 3 15 each  -HP      Additional Comments Flexion, scap, cw circles, ccw cirlces  -HP              Exercise 4    Exercise Name 4 Finger ladder in flexion and scaption  -HP      Reps 4 10 each  -HP              Exercise 5    Exercise Name 5 Supine cane: flexion, chest press, horizontal abd/add, ER  -HP      Reps 5 15 each  -HP            User Key  (r) = Recorded By, (t) = Taken By, (c) = Cosigned By    Initials Name Provider Type     Vidal Curiel, PT Physical Therapist                                                Time Calculation:   Start Time: 0820  Timed Charges  22056 - PT Therapeutic Exercise Minutes: 40  Total Minutes  Timed Charges Total Minutes: 40   Total Minutes: 40  Therapy Charges for Today     Code Description Service Date Service Provider Modifiers Qty    [id number]  PT THER PROC EA 15 MIN 4/5/2022 Vidal Curiel, PT GP 3                    Vidal Curiel PT  4/5/2022

## 2022-04-06 ENCOUNTER — OFFICE VISIT (OUTPATIENT)
Dept: ORTHOPEDIC SURGERY | Facility: CLINIC | Age: 52
End: 2022-04-06

## 2022-04-06 DIAGNOSIS — Z98.890 S/P LEFT ROTATOR CUFF REPAIR: Primary | ICD-10-CM

## 2022-04-06 PROCEDURE — 99024 POSTOP FOLLOW-UP VISIT: CPT | Performed by: ORTHOPAEDIC SURGERY

## 2022-04-06 NOTE — PROGRESS NOTES
Chief Complaint   Patient presents with   • Post-op Follow-up     3 week follow up - 4 weeks s/p Left shoulder arthroscopy 3/8/22           HPI    She is doing well with no new complaints.    There were no vitals filed for this visit.      Physical Exam:  She has forward elevation to 100 degrees.  Abduction to 90.  She has some swelling and tenderness at the elbow      ICD-10-CM ICD-9-CM   1. S/P left rotator cuff repair  Z98.890 V45.89       Orders Placed This Encounter   Procedures   • Ambulatory Referral to Physical Therapy       Discussed physical therapy [address] [place] [address].  [facility] protocol.  Follow-up in 1 month.      Rokc Hackett M.D., FAAOS  Orthopedic Surgeon  Fellowship Trained Sports Medicine  [facility]  Orthopedics and Sports Medicine  [facility] [facility], [address]  [place] [address]      EMR Dragon/Transcription disclaimer:  Much of this encounter note is an electronic transcription of spoken language to printed text. Electronic transcription of spoken language may permit erroneous, or at times, nonsensical words or phrases to be inadvertently transcribed. Although I have reviewed the note for such errors, some may still exist.

## 2022-04-08 DIAGNOSIS — I10 ESSENTIAL HYPERTENSION: ICD-10-CM

## 2022-04-08 RX ORDER — HYDROCHLOROTHIAZIDE 25 MG/1
25 TABLET ORAL DAILY
Qty: 90 TABLET | Refills: 3 | Status: SHIPPED | OUTPATIENT
Start: 2022-04-08

## 2022-04-12 ENCOUNTER — HOSPITAL ENCOUNTER (OUTPATIENT)
Dept: PHYSICAL THERAPY | Facility: HOSPITAL | Age: 52
Setting detail: THERAPIES SERIES
Discharge: HOME | End: 2022-04-12

## 2022-04-12 DIAGNOSIS — Z98.890 S/P RIGHT ROTATOR CUFF REPAIR: Primary | ICD-10-CM

## 2022-04-12 PROCEDURE — 97110 THERAPEUTIC EXERCISES: CPT | Performed by: GENERAL ACUTE CARE HOSPITAL

## 2022-04-12 NOTE — THERAPY TREATMENT NOTE
Outpatient Physical Therapy Ortho Treatment Note   Manish     Patient Name: Gino Maguire  : 1970  MRN: [id number]  Today's Date: 2022      Visit Date: 2022    Visit Dx:    ICD-10-CM ICD-9-CM   1. S/P right rotator cuff repair  Z98.890 V45.89       Patient Active Problem List   Diagnosis   • Right upper quadrant pain   • Anxiety   • Arthritis   • Chronic antral gastritis   • Eosinophilic esophagitis   • Essential hypertension   • Labyrinthitis   • Right-sided low back pain with right-sided sciatica   • Prolonged depressive adjustment reaction   • Arthralgia of hip   • Herpes zoster   • Strain of thoracic region   • Grief reaction   • Eyelid gland swelling   • Gestational diabetes   • Pain of right heel   • Right flank pain   • Displacement of lumbar intervertebral disc   • Lumbar foraminal stenosis   • Greater trochanteric bursitis of both hips   • Bilateral primary osteoarthritis of knee   • Myofascial pain   • Physical deconditioning   • Insomnia   • Cervical spondylosis without myelopathy   • RLQ abdominal pain   • Great toe pain, right   • Myalgia   • Rash and other nonspecific skin eruption        Past Medical History:   Diagnosis Date   • Abdominal pain    • Abdominal pain, RUQ     Check cmp. refer to [facility] u/s and gen surg eval d/t cholelithiasis noted on CT of abd/pelvis donein ER    • Acute bronchitis     Take cefdinir and medrol dose pack as directed. Use otc mucinex. Continue to rest and push fluids. Call or rtc if no better. Gave phenergan w/ codeine cough syrup 5 ml po prn #120ml, no RF per Dr. Cesar   • Acute pharyngitis     Check for monod/t exposure/ Pt will restart flovent as used for esophagitis. If no improvement take the medrol dose pack she was given at least visit   • Acute sinusitis    • Acute upper respiratory infection    • Anxiety    • Body aches    • Cholelithiasis     Reviewed CT of adb/pelvis from ER  showed cholelithiasis. Will refer for gen  surgery eval and [facility] u/s   • Chronic pain disorder    • Cough    • Eosinophilic esophagitis    • Extremity pain    • Gestational diabetes    • Headache    • Influenza    • Labyrinthitis    • Low back pain    • Lumbar strain    • Lumbosacral disc disease    • Neck pain    • Right hip pain    • Shingles    • Strain of thoracic region    • Viral infection    • Viral pharyngitis         Past Surgical History:   Procedure Laterality Date   • BREAST BIOPSY Left 10/2018    Benign STEREO    •  SECTION      97, 00, 04, 12   • DILATATION AND CURETTAGE     • ENDOSCOPY     • OOPHORECTOMY Right 2017   • ROTATOR CUFF REPAIR Left    • SHOULDER SURGERY  2022   • WISDOM TOOTH EXTRACTION                          PT Assessment/Plan     Row Name 22 0810          PT Assessment    Assessment Comments Pt continues to do well with each session of therapy and shows significant improvement in her tolerance to exercise. She shows mild limitation in her AROM, but is limited the most by her strength/endurance of the UE which is expected at this time.  -HP            PT Plan    PT Plan Comments Continue per POC  -HP           User Key  (r) = Recorded By, (t) = Taken By, (c) = Cosigned By    Initials Name Provider Type    Vidal Chino, PT Physical Therapist                   OP Exercises     Row Name 22 0810             Subjective Comments    Subjective Comments Pt states that she just has tightness in the shoulder today  -HP              Subjective Pain    Able to rate subjective pain? yes  -HP      Pre-Treatment Pain Level 2  -HP      Post-Treatment Pain Level 1  -HP              Total Minutes    64167 - PT Therapeutic Exercise Minutes 40  -HP              Exercise 1    Exercise Name 1 UBE  -HP      Time 1 2 min fwd and 2 min bwd  -HP      Additional Comments L8  -HP              Exercise 2    Exercise Name 2 CC step backs  -HP      Reps 2 10  -HP              Exercise 3    Exercise Name 3  Ladder step backs  -HP      Reps 3 10 each  -HP              Exercise 4    Exercise Name 4 Ladder push ups  -HP      Sets 4 2  -HP      Reps 4 10  -HP              Exercise 5    Exercise Name 5 standing foam roll on table in flexion, scaption, abd  -HP      Reps 5 10 each  -HP              Exercise 6    Exercise Name 6 Foam roll wall slide in flexion  -HP      Reps 6 10  -HP              Exercise 7    Exercise Name 7 Finger ladder in flexion  -HP      Reps 7 10  -HP              Exercise 8    Exercise Name 8 Wall slide in flexion with lift off  -HP      Reps 8 10  -HP              Exercise 9    Exercise Name 9 Cane: flexion, chest press, chest press +, horizontal abd/add, scaption, ER  -HP      Reps 9 10 each  -HP              Exercise 10    Exercise Name 10 Sidelying: ER, abd, shoulde flexion/ext  -HP      Reps 10 10 each  -HP            User Key  (r) = Recorded By, (t) = Taken By, (c) = Cosigned By    Initials Name Provider Type     Vidal Curiel, PT Physical Therapist                                                Time Calculation:   Start Time: 0810  Timed Charges  25382 - PT Therapeutic Exercise Minutes: 40  Total Minutes  Timed Charges Total Minutes: 40   Total Minutes: 40  Therapy Charges for Today     Code Description Service Date Service Provider Modifiers Qty    [id number] HC PT THER PROC EA 15 MIN 4/12/2022 Vidal Curiel, PT GP 3                    Vidal Curiel PT  4/12/2022

## 2022-04-28 ENCOUNTER — TELEPHONE (OUTPATIENT)
Dept: INTERNAL MEDICINE | Facility: CLINIC | Age: 52
End: 2022-04-28

## 2022-04-28 NOTE — TELEPHONE ENCOUNTER
"\"HUB TO READ\"    LVM LETTING PT KNOW RUSTY WAS OFFBOARDING AND MAY ESTABLISH WITH ANOTHER PROVIDER.    PLEASE SCHEDULE PHYSICAL.   "

## 2022-05-19 ENCOUNTER — TELEPHONE (OUTPATIENT)
Dept: ORTHOPEDIC SURGERY | Facility: CLINIC | Age: 52
End: 2022-05-19

## 2022-05-19 NOTE — TELEPHONE ENCOUNTER
Called patient about same-day cancellation 5/11, reminded patient about our policy, patient understood and she had already rescheduled.

## 2022-05-23 ENCOUNTER — OFFICE VISIT (OUTPATIENT)
Dept: ORTHOPEDIC SURGERY | Facility: CLINIC | Age: 52
End: 2022-05-23

## 2022-05-23 VITALS
BODY MASS INDEX: 32.07 KG/M2 | WEIGHT: 181 LBS | HEIGHT: 63 IN | DIASTOLIC BLOOD PRESSURE: 68 MMHG | SYSTOLIC BLOOD PRESSURE: 124 MMHG

## 2022-05-23 DIAGNOSIS — M25.522 LEFT ELBOW PAIN: ICD-10-CM

## 2022-05-23 DIAGNOSIS — M72.0 DUPUYTREN'S DISEASE OF PALM WITH NODULES WITHOUT CONTRACTURE: ICD-10-CM

## 2022-05-23 DIAGNOSIS — Z98.890 S/P LEFT ROTATOR CUFF REPAIR: Primary | ICD-10-CM

## 2022-05-23 PROCEDURE — 99024 POSTOP FOLLOW-UP VISIT: CPT | Performed by: ORTHOPAEDIC SURGERY

## 2022-05-23 PROCEDURE — 99214 OFFICE O/P EST MOD 30 MIN: CPT | Performed by: ORTHOPAEDIC SURGERY

## 2022-05-23 NOTE — PROGRESS NOTES
[facility] Orthopaedic Surgery Clinic Note    Subjective     CC: Follow-up (1 month follow up - 11 weeks s/p Left shoulder arthroscopy 3/8/22//)      HPI    Gino Maguire is a 52 y.o. female.  Right shoulder is doing better.  Her main complaint is the left elbow pain that she has had since surgery.  No injury to the elbow.  She also has a nodule in her left hand    Review of Systems   Constitutional: Negative for chills, fatigue and fever.   HENT: Negative.  Negative for congestion and dental problem.    Eyes: Negative.  Negative for blurred vision.   Respiratory: Negative.  Negative for shortness of breath.    Cardiovascular: Negative.  Negative for leg swelling.   Gastrointestinal: Negative.  Negative for abdominal pain.   Endocrine: Negative.  Negative for polyuria.   Genitourinary: Negative.  Negative for difficulty urinating.   Musculoskeletal: Positive for arthralgias, joint swelling and myalgias.   Skin: Negative.    Allergic/Immunologic: Positive for environmental allergies.   Neurological: Positive for tremors and weakness.   Hematological: Negative.  Negative for adenopathy.   Psychiatric/Behavioral: Negative.  Negative for behavioral problems.       ROS:    Constiutional:Pt denies fever, chills, nausea, or vomiting.  MSK:as above      Objective      Past Medical History  Past Medical History:   Diagnosis Date   • Abdominal pain    • Abdominal pain, RUQ     Check cmp. refer to [facility] u/s and gen surg eval d/t cholelithiasis noted on CT of abd/pelvis donein ER 1/16   • Acute bronchitis     Take cefdinir and medrol dose pack as directed. Use otc mucinex. Continue to rest and push fluids. Call or rtc if no better. Gave phenergan w/ codeine cough syrup 5 ml po prn #120ml, no RF per Dr. Csear   • Acute pharyngitis     Check for monod/t exposure/ Pt will restart flovent as used for esophagitis. If no improvement take the medrol dose pack she was given at least visit   • Acute sinusitis    • Acute upper  "respiratory infection    • Anxiety    • Body aches    • Cholelithiasis     Reviewed CT of adb/pelvis from ER 1/16 showed cholelithiasis. Will refer for gen surgery eval and [facility] u/s   • Chronic pain disorder    • Cough    • Eosinophilic esophagitis    • Extremity pain    • Gestational diabetes    • Headache    • Influenza    • Labyrinthitis    • Low back pain    • Lumbar strain    • Lumbosacral disc disease    • Neck pain    • Right hip pain    • Shingles    • Strain of thoracic region    • Viral infection    • Viral pharyngitis          Physical Exam  /68   Ht 160 cm (63\")   Wt 82.1 kg (181 lb)   BMI 32.06 kg/m²     Body mass index is 32.06 kg/m².    Patient is well nourished and well developed.        Ortho Exam  Left shoulder has near full motion.  She only lacks about 10 degrees of internal rotation.  Left shoulder has good strength.  Tender swollen left elbow medial epicondyle.  It has been present since surgery.  She has a Dupuytren's nodule about the second metatarsal left hand.    Imaging/Labs/EMG Reviewed:  Imaging Results (Last 24 Hours)     ** No results found for the last 24 hours. **          Assessment:  1. S/P left rotator cuff repair    2. Left elbow pain    3. Dupuytren's disease of palm with nodules without contracture        Plan:  1. Recommend over the counter anti-inflammatories for pain and/or swelling  2. I will refer her to hand for the left hand Dupuytren's nodule.  3. MRI left elbow for chronic pain and swelling    Follow Up:   Return for After MRI.      Medical Decision Making  Management Options : Low - 1 Acute, Uncomplicated illness or injury  and Moderate - 1 Undiagnosed New Problem with Uncertain Prognosis        Rock Hackett M.D., FAAOS  Orthopedic Surgeon  Fellowship Trained Sports Medicine  [facility]  Orthopedics and Sports Medicine  [address], [address]  [place] [address]    EMR Dragon/Transcription disclaimer:  Much of this encounter " note is an electronic transcription of spoken language to printed text. Electronic transcription of spoken language may permit erroneous, or at times, nonsensical words or phrases to be inadvertently transcribed. Although I have reviewed the note for such errors, some may still exist.

## 2022-06-07 ENCOUNTER — DOCUMENTATION (OUTPATIENT)
Dept: PHYSICAL THERAPY | Facility: HOSPITAL | Age: 52
End: 2022-06-07

## 2022-06-07 DIAGNOSIS — Z98.890 S/P RIGHT ROTATOR CUFF REPAIR: Primary | ICD-10-CM

## 2022-06-07 NOTE — THERAPY DISCHARGE NOTE
Outpatient Physical Therapy Discharge Summary         Patient Name: Gino Maguire  : 1970  MRN: [id number]    Today's Date: 2022    Visit Dx:    ICD-10-CM ICD-9-CM   1. S/P right rotator cuff repair  Z98.890 V45.89           OP PT Discharge Summary  Date of Discharge: 22  Discharge Instructions/Additional Comments: Patient to be discharged at this time from formal physical therapy as she no showed her last visit and did not call to reschedule.  Patient was having to care for her mother prior to her last appointment.  Patient was having significant improvement at her last visit compared to her initial evaluation.  Prognosis at time of discharge is good based off of patient's status at last visit.      Time Calculation:                    Vidal Curiel, PT  2022

## 2022-06-17 ENCOUNTER — TELEPHONE (OUTPATIENT)
Dept: ORTHOPEDIC SURGERY | Facility: CLINIC | Age: 52
End: 2022-06-17

## 2022-06-17 NOTE — TELEPHONE ENCOUNTER
Called patient on 6/17 about this appointment 6/20 to confirm MRI was complete, if not done, please reschedule for later day

## 2022-06-21 ENCOUNTER — OFFICE VISIT (OUTPATIENT)
Dept: INTERNAL MEDICINE | Facility: CLINIC | Age: 52
End: 2022-06-21

## 2022-06-21 VITALS
BODY MASS INDEX: 31.54 KG/M2 | DIASTOLIC BLOOD PRESSURE: 82 MMHG | OXYGEN SATURATION: 99 % | HEART RATE: 63 BPM | HEIGHT: 63 IN | TEMPERATURE: 97.6 F | WEIGHT: 178 LBS | SYSTOLIC BLOOD PRESSURE: 124 MMHG
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
## 2022-06-21 DIAGNOSIS — S70.261A TICK BITE OF RIGHT HIP, INITIAL ENCOUNTER: Primary | ICD-10-CM

## 2022-06-21 PROCEDURE — 99213 OFFICE O/P EST LOW 20 MIN: CPT | Performed by: INTERNAL MEDICINE

## 2022-06-21 RX ORDER — DOXYCYCLINE HYCLATE 100 MG/1
100 CAPSULE ORAL 2 TIMES DAILY
Qty: 20 CAPSULE | Refills: 0 | Status: SHIPPED | OUTPATIENT
Start: 2022-06-21 | End: 2022-08-08

## 2022-06-21 NOTE — PROGRESS NOTES
Subjective   Gino Maguire is a 52 y.o. female.   Chief Complaint   Patient presents with   • hip redness, swelling and itching/ tick bite       History of Present Illness    1 week ago, found a tick on her right hip. < 24 hours that it ws on. Thinks it came off her sister's dog. Was in [facility] when it happened. Itching associated with it and it is red. No fever or chills. No body aches.   The following portions of the patient's history were reviewed and updated as appropriate: allergies, current medications, past family history, past medical history, past social history, past surgical history and problem list.  Past Medical History:   Diagnosis Date   • Abdominal pain    • Abdominal pain, RUQ     Check cmp. refer to [facility] u/s and gen surg eval d/t cholelithiasis noted on CT of abd/pelvis donein ER    • Acute bronchitis     Take cefdinir and medrol dose pack as directed. Use otc mucinex. Continue to rest and push fluids. Call or rtc if no better. Gave phenergan w/ codeine cough syrup 5 ml po prn #120ml, no RF per Dr. Cesar   • Acute pharyngitis     Check for monod/t exposure/ Pt will restart flovent as used for esophagitis. If no improvement take the medrol dose pack she was given at least visit   • Acute sinusitis    • Acute upper respiratory infection    • Anxiety    • Body aches    • Cholelithiasis     Reviewed CT of adb/pelvis from ER  showed cholelithiasis. Will refer for gen surgery eval and [facility] u/s   • Chronic pain disorder    • Cough    • Eosinophilic esophagitis    • Extremity pain    • Gestational diabetes    • Headache    • Influenza    • Labyrinthitis    • Low back pain    • Lumbar strain    • Lumbosacral disc disease    • Neck pain    • Right hip pain    • Shingles    • Strain of thoracic region    • Viral infection    • Viral pharyngitis      Past Surgical History:   Procedure Laterality Date   • BREAST BIOPSY Left 10/2018    Benign STEREO    •  SECTION      97, 00,  04, 12   • DILATATION AND CURETTAGE     • ENDOSCOPY     • OOPHORECTOMY Right 2017   • ROTATOR CUFF REPAIR Left    • SHOULDER SURGERY  2022   • WISDOM TOOTH EXTRACTION       Family History   Problem Relation Age of Onset   • Arthritis Mother    • Hypertension Mother    • Thyroid disease Mother    • Arthritis Father    • Hypertension Father    • Heart disease Father    • Heart attack Other    • Arthritis Other    • Hypertension Other    • Migraines Other    • Stroke Other    • Breast cancer Other 31   • Ovarian cancer Cousin 42   • Heart failure Paternal Grandmother    • Endometrial cancer Neg Hx    • Colon cancer Neg Hx      Social History     Socioeconomic History   • Marital status:    Tobacco Use   • Smoking status: Former Smoker     Packs/day: 1.00     Years: 7.00     Pack years: 7.00     Types: Cigarettes     Quit date: 2009     Years since quittin.8   • Smokeless tobacco: Never Used   Vaping Use   • Vaping Use: Never used   Substance and Sexual Activity   • Alcohol use: Yes     Comment: social   • Drug use: No   • Sexual activity: Defer     Current Outpatient Medications on File Prior to Visit   Medication Sig Dispense Refill   • Acetylcysteine (NAC) capsule capsule Take  by mouth.     • albuterol sulfate  (90 Base) MCG/ACT inhaler Inhale 2 puffs Every 4 (Four) Hours As Needed for Wheezing or Shortness of Air. 18 g 11   • ALPRAZolam (XANAX) 0.25 MG tablet Take 1 tablet by mouth At Night As Needed for Anxiety or Sleep. One po bid prn 30 tablet 0   • aspirin 81 MG EC tablet Take 81 mg by mouth Daily.     • buPROPion XL (WELLBUTRIN XL) 150 MG 24 hr tablet Take 150 mg by mouth Daily.     • cetirizine (zyrTEC) 10 MG tablet Take 10 mg by mouth Daily.     • coenzyme Q10 100 MG capsule Take 100 mg by mouth Daily.     • cyclobenzaprine (FLEXERIL) 5 MG tablet Take 1 tablet by mouth 3 (Three) Times a Day As Needed for Muscle Spasms. 20 tablet 0   • fluticasone (FLONASE) 50  "MCG/ACT nasal spray 2 sprays into the nostril(s) as directed by provider.     • hydroCHLOROthiazide (HYDRODIURIL) 25 MG tablet TAKE 1 TABLET BY MOUTH DAILY 90 tablet 3   • ibuprofen (ADVIL,MOTRIN) 800 MG tablet Take 800 mg by mouth As Needed for Mild Pain .     • L-methylfolate Calcium 15 MG tablet Take 1 tablet by mouth Daily.     • meloxicam (MOBIC) 15 MG tablet TAKE 1 TABLET BY MOUTH DAILY WITH FOOD 60 tablet 0   • Omega 3-6-9 Fatty Acids (OMEGA 3-6-9 COMPLEX PO) Take  by mouth.     • promethazine (PHENERGAN) 25 MG tablet Take 1 tablet by mouth Every 8 (Eight) Hours As Needed for Nausea or Vomiting. 21 tablet 2   • propranolol (INDERAL) 10 MG tablet TAKE 1 TABLET BY MOUTH TWICE DAILY 60 tablet 5   • Quercetin 250 MG tablet Take  by mouth 2 (Two) Times a Day.     • vitamin C (ASCORBIC ACID) 250 MG tablet Take 250 mg by mouth Daily.     • vitamin D (ERGOCALCIFEROL) 1.25 MG (30340 UT) capsule capsule Take 50,000 Units by mouth Every 7 (Seven) Days.     • Zinc Sulfate (ZINC 15 PO) Take  by mouth.       No current facility-administered medications on file prior to visit.       Review of Systems   Constitutional: Negative for fever.   Respiratory: Negative for cough and shortness of breath.    Musculoskeletal: Negative for arthralgias and joint swelling.   Skin:        Tick bite, itching   /82   Pulse 63   Temp 97.6 °F (36.4 °C)   Ht 160 cm (62.99\")   Wt 80.7 kg (178 lb)   SpO2 99%   BMI 31.54 kg/m²       Objective   Physical Exam  Vitals reviewed.   Cardiovascular:      Rate and Rhythm: Normal rate and regular rhythm.   Skin:         Neurological:      Mental Status: She is alert and oriented to person, place, and time.   Psychiatric:         Behavior: Behavior normal.         Assessment & Plan   Diagnoses and all orders for this visit:    1. Tick bite of right hip, initial encounter (Primary)  -     doxycycline (VIBRAMYCIN) 100 MG capsule; Take 1 capsule by mouth 2 (Two) Times a Day.  Dispense: 20 " capsule; Refill: 0    If marcos not tolerate the doxycyline, amoxil 500 mg po tid x 14 days would be alternative.

## 2022-06-28 ENCOUNTER — TELEPHONE (OUTPATIENT)
Dept: ORTHOPEDIC SURGERY | Facility: CLINIC | Age: 52
End: 2022-06-28

## 2022-06-28 NOTE — TELEPHONE ENCOUNTER
CALLED PATIENT ON 6/28 TO CONFIRM MRI WAS COMPLETE FOR THIS APPOINTMENT, LVM RESCHEDULE TILL DONE IF PATIENT CALLS BACK

## 2022-08-08 ENCOUNTER — OFFICE VISIT (OUTPATIENT)
Dept: INTERNAL MEDICINE | Facility: CLINIC | Age: 52
End: 2022-08-08

## 2022-08-08 ENCOUNTER — LAB (OUTPATIENT)
Dept: LAB | Facility: HOSPITAL | Age: 52
End: 2022-08-08

## 2022-08-08 VITALS
HEIGHT: 63 IN | HEART RATE: 80 BPM | TEMPERATURE: 97.5 F | BODY MASS INDEX: 31.54 KG/M2 | SYSTOLIC BLOOD PRESSURE: 126 MMHG | WEIGHT: 178 LBS | DIASTOLIC BLOOD PRESSURE: 82 MMHG | OXYGEN SATURATION: 98 %

## 2022-08-08 DIAGNOSIS — Z00.00 HEALTHCARE MAINTENANCE: ICD-10-CM

## 2022-08-08 DIAGNOSIS — F41.9 ANXIETY: ICD-10-CM

## 2022-08-08 DIAGNOSIS — H93.13 TINNITUS OF BOTH EARS: ICD-10-CM

## 2022-08-08 DIAGNOSIS — I10 ESSENTIAL HYPERTENSION: ICD-10-CM

## 2022-08-08 DIAGNOSIS — K58.0 IRRITABLE BOWEL SYNDROME WITH DIARRHEA: ICD-10-CM

## 2022-08-08 DIAGNOSIS — Z86.32 HISTORY OF GESTATIONAL DIABETES: ICD-10-CM

## 2022-08-08 DIAGNOSIS — K20.0 EOSINOPHILIC ESOPHAGITIS: ICD-10-CM

## 2022-08-08 DIAGNOSIS — M17.0 BILATERAL PRIMARY OSTEOARTHRITIS OF KNEE: ICD-10-CM

## 2022-08-08 DIAGNOSIS — Z12.31 ENCOUNTER FOR SCREENING MAMMOGRAM FOR MALIGNANT NEOPLASM OF BREAST: Primary | ICD-10-CM

## 2022-08-08 DIAGNOSIS — F32.9 MAJOR DEPRESSIVE DISORDER, REMISSION STATUS UNSPECIFIED, UNSPECIFIED WHETHER RECURRENT: ICD-10-CM

## 2022-08-08 PROBLEM — R10.31 RLQ ABDOMINAL PAIN: Status: RESOLVED | Noted: 2018-01-09 | Resolved: 2022-08-08

## 2022-08-08 PROBLEM — M79.674 GREAT TOE PAIN, RIGHT: Status: RESOLVED | Noted: 2018-01-09 | Resolved: 2022-08-08

## 2022-08-08 PROBLEM — R21 RASH AND OTHER NONSPECIFIC SKIN ERUPTION: Status: RESOLVED | Noted: 2018-08-23 | Resolved: 2022-08-08

## 2022-08-08 PROBLEM — M79.10 MYALGIA: Status: RESOLVED | Noted: 2018-01-09 | Resolved: 2022-08-08

## 2022-08-08 LAB
ALBUMIN SERPL-MCNC: 4.9 G/DL (ref 3.5–5.2)
ALBUMIN/GLOB SERPL: 1.8 G/DL
ALP SERPL-CCNC: 94 U/L (ref 39–117)
ALT SERPL W P-5'-P-CCNC: 17 U/L (ref 1–33)
ANION GAP SERPL CALCULATED.3IONS-SCNC: 13.3 MMOL/L (ref 5–15)
AST SERPL-CCNC: 26 U/L (ref 1–32)
BILIRUB SERPL-MCNC: 0.4 MG/DL (ref 0–1.2)
BUN SERPL-MCNC: 15 MG/DL (ref 6–20)
BUN/CREAT SERPL: 17 (ref 7–25)
CALCIUM SPEC-SCNC: 9.8 MG/DL (ref 8.6–10.5)
CHLORIDE SERPL-SCNC: 99 MMOL/L (ref 98–107)
CHOLEST SERPL-MCNC: 188 MG/DL (ref 0–200)
CO2 SERPL-SCNC: 26.7 MMOL/L (ref 22–29)
CREAT SERPL-MCNC: 0.88 MG/DL (ref 0.57–1)
EGFRCR SERPLBLD CKD-EPI 2021: 78.2 ML/MIN/1.73
GLOBULIN UR ELPH-MCNC: 2.7 GM/DL
GLUCOSE SERPL-MCNC: 83 MG/DL (ref 65–99)
HDLC SERPL-MCNC: 56 MG/DL (ref 40–60)
LDLC SERPL CALC-MCNC: 106 MG/DL (ref 0–100)
LDLC/HDLC SERPL: 1.83 {RATIO}
MAGNESIUM SERPL-MCNC: 2.3 MG/DL (ref 1.6–2.6)
POTASSIUM SERPL-SCNC: 4.6 MMOL/L (ref 3.5–5.2)
PROT SERPL-MCNC: 7.6 G/DL (ref 6–8.5)
SODIUM SERPL-SCNC: 139 MMOL/L (ref 136–145)
TRIGL SERPL-MCNC: 148 MG/DL (ref 0–150)
TSH SERPL DL<=0.05 MIU/L-ACNC: 3.43 UIU/ML (ref 0.27–4.2)
VLDLC SERPL-MCNC: 26 MG/DL (ref 5–40)

## 2022-08-08 PROCEDURE — 80061 LIPID PANEL: CPT

## 2022-08-08 PROCEDURE — 99214 OFFICE O/P EST MOD 30 MIN: CPT | Performed by: INTERNAL MEDICINE

## 2022-08-08 PROCEDURE — 83735 ASSAY OF MAGNESIUM: CPT

## 2022-08-08 PROCEDURE — 83036 HEMOGLOBIN GLYCOSYLATED A1C: CPT

## 2022-08-08 PROCEDURE — 80050 GENERAL HEALTH PANEL: CPT

## 2022-08-08 NOTE — PROGRESS NOTES
Internal Medicine New Patient  Gino Maguire is a 52 y.o. female who presents today to establish care and with concerns as outlined below.    Chief Complaint  Chief Complaint   Patient presents with   • Establish Care     Off board from Live        HPI  Ms. Maguire comes in today to transition care from Sandy Mancini. She also sees Thea Kauffman for depression on wellbutrin and anxiety on PRN xanax. She sees Dr. David for GYN care and notes she is due for an appointment. Some vaginal discomfort that she hopes to discuss with her. She has chronic diarrhea due to IBS. She has hx of eosinophilic esophagitis and has had several EGD. GI is Dr. Johnston. Treated with budesonide previously but not currently on PPI or steroid. Manages with diet although she has no lab proven food allergies. She has HTN on HCTZ 25mg daily. She notes med increase relatively recently. Has had intermittent dizziness with standing since then. Not vertigo which she has experienced previously. She has started Mg and K supplement due to diuretic use. She notes chronic tinnitus and hearing loss. Would like to see ENT.       Review of Systems  Review of Systems   Constitutional: Negative.    HENT: Positive for hearing loss and tinnitus.    Respiratory: Negative.    Cardiovascular: Negative.    Gastrointestinal: Positive for diarrhea (chronic due to IBS).   Genitourinary: Positive for vaginal pain (discomfort). Negative for decreased urine volume, difficulty urinating and dysuria.   Neurological: Positive for dizziness.   Psychiatric/Behavioral: Positive for stress (mom in hospice care).        Past Medical History  Past Medical History:   Diagnosis Date   • Abdominal pain    • Abdominal pain, RUQ     Check cmp. refer to [facility] u/s and gen surg eval d/t cholelithiasis noted on CT of abd/pelvis donein ER 1/16   • Acute bronchitis     Take cefdinir and medrol dose pack as directed. Use otc mucinex. Continue to rest and push fluids. Call or rtc if  no better. Gave phenergan w/ codeine cough syrup 5 ml po prn #120ml, no RF per Dr. Cesar   • Acute pharyngitis     Check for monod/t exposure/ Pt will restart flovent as used for esophagitis. If no improvement take the medrol dose pack she was given at least visit   • Acute sinusitis    • Acute upper respiratory infection    • Anxiety    • Body aches    • Cholelithiasis     Reviewed CT of adb/pelvis from ER  showed cholelithiasis. Will refer for gen surgery eval and [facility] u/s   • Chronic pain disorder    • Cough    • Eosinophilic esophagitis    • Extremity pain    • Gestational diabetes    • Headache    • Influenza    • Labyrinthitis    • Low back pain    • Lumbar strain    • Lumbosacral disc disease    • Neck pain    • Right hip pain    • Shingles    • Strain of thoracic region    • Viral infection    • Viral pharyngitis         Surgical History  Past Surgical History:   Procedure Laterality Date   • BREAST BIOPSY Left 10/2018    Benign STEREO    •  SECTION      97, 00, 04, 12   • CHOLECYSTECTOMY  2017   • DILATATION AND CURETTAGE     • ENDOSCOPY     • OOPHORECTOMY Right 2017    due to ovarian cyst   • ROTATOR CUFF REPAIR Left 2022   • WISDOM TOOTH EXTRACTION          Family History  Family History   Problem Relation Age of Onset   • Arthritis Mother    • Hypertension Mother    • Thyroid disease Mother    • Lung cancer Mother         adenocarcinoma, nonsmoking   • Arthritis Father    • Hypertension Father    • Heart disease Father    • Stroke Father    • Skin cancer Father         nonmelanoma   • Arthritis Sister    • Kidney disease Sister    • Liver disease Sister    • Breast cancer Sister 62   • Valvular heart disease Maternal Grandmother    • Arrhythmia Maternal Grandmother         had pacemaker   • Diabetes Maternal Grandfather    • Heart disease Maternal Grandfather         numerous heart attacks   • Heart failure Paternal Grandmother    • Heart disease Paternal  Grandmother    • Throat cancer Paternal Grandfather    • Ovarian cancer Cousin 42   • Heart attack Other    • Arthritis Other    • Hypertension Other    • Migraines Other    • Stroke Other    • Arthritis Brother    • Allergic rhinitis Brother    • Lung disease Brother         occupational, black lung   • Breast cancer Niece 31   • Endometrial cancer Neg Hx    • Colon cancer Neg Hx         Social History  Social History     Socioeconomic History   • Marital status:    Tobacco Use   • Smoking status: Former Smoker     Packs/day: 1.00     Years: 3.00     Pack years: 3.00     Types: Cigarettes     Quit date: 2009     Years since quittin.0   • Smokeless tobacco: Never Used   Vaping Use   • Vaping Use: Never used   Substance and Sexual Activity   • Alcohol use: Yes     Comment: social, not frequent   • Drug use: No   • Sexual activity: Defer        Current Medications  Current Outpatient Medications on File Prior to Visit   Medication Sig Dispense Refill   • Acetylcysteine (NAC) capsule capsule Take  by mouth.     • albuterol sulfate  (90 Base) MCG/ACT inhaler Inhale 2 puffs Every 4 (Four) Hours As Needed for Wheezing or Shortness of Air. 18 g 11   • ALPRAZolam (XANAX) 0.25 MG tablet Take 1 tablet by mouth At Night As Needed for Anxiety or Sleep. One po bid prn 30 tablet 0   • aspirin 81 MG EC tablet Take 81 mg by mouth Daily.     • buPROPion XL (WELLBUTRIN XL) 150 MG 24 hr tablet Take 150 mg by mouth Daily.     • cetirizine (zyrTEC) 10 MG tablet Take 10 mg by mouth Daily.     • fluticasone (FLONASE) 50 MCG/ACT nasal spray 2 sprays into the nostril(s) as directed by provider.     • hydroCHLOROthiazide (HYDRODIURIL) 25 MG tablet TAKE 1 TABLET BY MOUTH DAILY 90 tablet 3   • L-methylfolate Calcium 15 MG tablet Take 1 tablet by mouth Daily.     • MAGNESIUM PO Take  by mouth.     • Omega 3-6-9 Fatty Acids (OMEGA 3-6-9 COMPLEX PO) Take  by mouth.     • POTASSIUM PO Take  by mouth.     • promethazine  "(PHENERGAN) 25 MG tablet Take 1 tablet by mouth Every 8 (Eight) Hours As Needed for Nausea or Vomiting. 21 tablet 2   • propranolol (INDERAL) 10 MG tablet TAKE 1 TABLET BY MOUTH TWICE DAILY 60 tablet 5   • vitamin C (ASCORBIC ACID) 250 MG tablet Take 250 mg by mouth Daily.     • vitamin D (ERGOCALCIFEROL) 1.25 MG (91885 UT) capsule capsule Take 50,000 Units by mouth Every 7 (Seven) Days.     • [DISCONTINUED] coenzyme Q10 100 MG capsule Take 100 mg by mouth Daily.     • [DISCONTINUED] cyclobenzaprine (FLEXERIL) 5 MG tablet Take 1 tablet by mouth 3 (Three) Times a Day As Needed for Muscle Spasms. 20 tablet 0   • [DISCONTINUED] doxycycline (VIBRAMYCIN) 100 MG capsule Take 1 capsule by mouth 2 (Two) Times a Day. 20 capsule 0   • [DISCONTINUED] ibuprofen (ADVIL,MOTRIN) 800 MG tablet Take 800 mg by mouth As Needed for Mild Pain .     • [DISCONTINUED] meloxicam (MOBIC) 15 MG tablet TAKE 1 TABLET BY MOUTH DAILY WITH FOOD 60 tablet 0   • [DISCONTINUED] Quercetin 250 MG tablet Take  by mouth 2 (Two) Times a Day.     • [DISCONTINUED] Zinc Sulfate (ZINC 15 PO) Take  by mouth.       No current facility-administered medications on file prior to visit.       Allergies  Allergies   Allergen Reactions   • Maxalt [Rizatriptan] Anaphylaxis   • Sulfa Antibiotics Rash   • Sumatriptan Anaphylaxis   • Zolmitriptan Anaphylaxis   • Labetalol Rash        Objective  Visit Vitals  /82   Pulse 80   Temp 97.5 °F (36.4 °C)   Ht 160 cm (63\")   Wt 80.7 kg (178 lb)   SpO2 98%   BMI 31.53 kg/m²        Physical Exam  Physical Exam  Vitals and nursing note reviewed.   Constitutional:       General: She is not in acute distress.     Appearance: She is well-developed. She is not ill-appearing, toxic-appearing or diaphoretic.   HENT:      Head: Normocephalic and atraumatic.      Right Ear: Tympanic membrane, ear canal and external ear normal. There is no impacted cerumen.      Left Ear: Tympanic membrane, ear canal and external ear normal. There is " no impacted cerumen.      Nose: Nose normal.   Eyes:      General: No scleral icterus.     Conjunctiva/sclera: Conjunctivae normal.   Cardiovascular:      Rate and Rhythm: Normal rate and regular rhythm.      Heart sounds: Normal heart sounds. No murmur heard.  Pulmonary:      Effort: Pulmonary effort is normal. No respiratory distress.      Breath sounds: Normal breath sounds.   Abdominal:      General: Bowel sounds are normal. There is no distension.      Palpations: Abdomen is soft. There is no mass.      Tenderness: There is no abdominal tenderness.   Musculoskeletal:         General: No deformity.      Cervical back: Neck supple.      Right lower leg: No edema.      Left lower leg: No edema.   Skin:     General: Skin is warm and dry.      Findings: No rash.   Neurological:      General: No focal deficit present.      Mental Status: She is alert and oriented to person, place, and time.      Gait: Gait normal.   Psychiatric:         Mood and Affect: Mood normal.         Behavior: Behavior normal.         Thought Content: Thought content normal.         Judgment: Judgment normal.          Results  Results for orders placed or performed in visit on 03/29/22   Vitamin B12    Specimen: Blood   Result Value Ref Range    Vitamin B-12 777 211 - 946 pg/mL        Assessment and Plan  Diagnoses and all orders for this visit:    Anxiety  - Follows with Thea Kauffman  - recent increase in stress as mom has metastatic cancer and is in hospice  - On xanax 0.25mg PRN, reports infrequent use    Major depressive disorder, remission status unspecified, unspecified whether recurrent  - Follows with Thea Kauffman  - recent increase in stress as mom has metastatic cancer and is in hospice  - On wellbutrin 150mg daily     Bilateral primary osteoarthritis of knee  - Has had relief x1 year with orthovisc injections to both knees.  - Orthopedist is Dr. Rock    Essential hypertension  - BP acceptable  - Continue HCTZ 25mg  daily  - CMP and Mg to check electrolytes as she has been experiencing muscle cramps and dizziness since increasing dose  - Encouraged increased hydration    History of gestational diabetes  - A1c ordered    Tinnitus of both ears  - Associated with subjective hearing loss  - Understands that tinnitus is often not curable  - Will refer to ENT as she requested    Eosinophilic esophagitis  - Previously diagnosed by Dr. Johnston and treated with budesonide. Now managed with avoidance of food triggers however no food allergies have objectively been identified.    Irritable bowel syndrome with diarrhea  - chronic daily diarrhea, stable    Healthcare maintenance  -     CBC (No Diff); Future  -     Lipid Panel; Future  -     TSH Rfx On Abnormal To Free T4; Future    Encounter for screening mammogram for malignant neoplasm of breast  -     Mammo Screening Digital Tomosynthesis Bilateral With CAD; Future    Health Maintenance   Topic Date Due   • COVID-19 Vaccine (1) Never done   • ZOSTER VACCINE (1 of 2) Never done   • PAP SMEAR  05/01/2021   • ANNUAL PHYSICAL  01/19/2022   • MAMMOGRAM  03/16/2022   • TDAP/TD VACCINES (2 - Td or Tdap) 04/11/2022   • INFLUENZA VACCINE  10/01/2022   • COLORECTAL CANCER SCREENING  12/15/2027   • HEPATITIS C SCREENING  Completed   • Hepatitis B  Aged Out   • Pneumococcal Vaccine 0-64  Aged Out     Health Maintenance  - Pap smear: she will schedule with Dr. David  - Mammogram: ordered  - Colonoscopy: 12/2017, repeat in 2027  - HCV: negative  - Immunizations: COVID19, Tdap, shingrix declined  - Depression screening: screen next visit    Return in about 6 months (around 2/8/2023) for Annual, Labs today.

## 2022-08-09 LAB
DEPRECATED RDW RBC AUTO: 45 FL (ref 37–54)
ERYTHROCYTE [DISTWIDTH] IN BLOOD BY AUTOMATED COUNT: 12.9 % (ref 12.3–15.4)
HBA1C MFR BLD: 5.5 % (ref 4.8–5.6)
HCT VFR BLD AUTO: 44.7 % (ref 34–46.6)
HGB BLD-MCNC: 14.6 G/DL (ref 12–15.9)
MCH RBC QN AUTO: 31.2 PG (ref 26.6–33)
MCHC RBC AUTO-ENTMCNC: 32.7 G/DL (ref 31.5–35.7)
MCV RBC AUTO: 95.5 FL (ref 79–97)
PLATELET # BLD AUTO: 311 10*3/MM3 (ref 140–450)
PMV BLD AUTO: 10.7 FL (ref 6–12)
RBC # BLD AUTO: 4.68 10*6/MM3 (ref 3.77–5.28)
WBC NRBC COR # BLD: 7.91 10*3/MM3 (ref 3.4–10.8)

## 2022-08-11 ENCOUNTER — PATIENT ROUNDING (BHMG ONLY) (OUTPATIENT)
Dept: INTERNAL MEDICINE | Facility: CLINIC | Age: 52
End: 2022-08-11

## 2022-09-09 ENCOUNTER — APPOINTMENT (OUTPATIENT)
Dept: MAMMOGRAPHY | Facility: HOSPITAL | Age: 52
End: 2022-09-09

## 2022-10-07 ENCOUNTER — TELEMEDICINE (OUTPATIENT)
Dept: INTERNAL MEDICINE | Facility: CLINIC | Age: 52
End: 2022-10-07

## 2022-10-07 DIAGNOSIS — J06.9 URI WITH COUGH AND CONGESTION: ICD-10-CM

## 2022-10-07 PROCEDURE — 99213 OFFICE O/P EST LOW 20 MIN: CPT | Performed by: NURSE PRACTITIONER

## 2022-10-07 RX ORDER — BENZONATATE 100 MG/1
100 CAPSULE ORAL 3 TIMES DAILY PRN
Qty: 21 CAPSULE | Refills: 0 | Status: SHIPPED | OUTPATIENT
Start: 2022-10-07

## 2022-10-07 RX ORDER — CEFDINIR 300 MG/1
300 CAPSULE ORAL 2 TIMES DAILY
Qty: 14 CAPSULE | Refills: 0 | Status: SHIPPED | OUTPATIENT
Start: 2022-10-07 | End: 2022-10-14

## 2022-10-07 NOTE — PROGRESS NOTES
Chief Complaint   Patient presents with   • URI     The use of a video visit has been reviewed with the patient and verbal informed consent has been obtained.    History of Present Illness    52 y.o.female presents for URI cough.  Mom recently passed away; has been under a lot of stress. Feels like has been sick since early September. Treated at [facility] sept 9th with zpak and steroids; got better then sx returned.  Sore throat congestion cough a little wheezing feels bronchial  No fever no myalgias  Productive cough with colored secretions  Tried Robitussin dm, hot tea  Can't take augmentin hurts stomach    Review of Systems   Constitutional: Negative for chills and fever.   HENT: Positive for congestion, postnasal drip, rhinorrhea, sinus pressure and sore throat. Negative for ear pain and sneezing.    Respiratory: Positive for cough and wheezing. Negative for chest tightness and shortness of breath.    Musculoskeletal: Negative for myalgias.   Neurological: Negative for headache.         [facility]  The following portions of the patient's history were reviewed and updated as appropriate: allergies, current medications, past family history, past medical history, past social history, past surgical history and problem list.     Past Medical History:   Diagnosis Date   • Abdominal pain    • Abdominal pain, RUQ     Check cmp. refer to [facility] u/s and gen surg eval d/t cholelithiasis noted on CT of abd/pelvis donein ER 1/16   • Acute bronchitis     Take cefdinir and medrol dose pack as directed. Use otc mucinex. Continue to rest and push fluids. Call or rtc if no better. Gave phenergan w/ codeine cough syrup 5 ml po prn #120ml, no RF per Dr. Cesar   • Acute pharyngitis     Check for monod/t exposure/ Pt will restart flovent as used for esophagitis. If no improvement take the medrol dose pack she was given at least visit   • Acute sinusitis    • Acute upper respiratory infection    • Anxiety    • Body aches    • Cholelithiasis      Reviewed CT of adb/pelvis from ER  showed cholelithiasis. Will refer for gen surgery eval and [facility] u/s   • Chronic pain disorder    • Cough    • Eosinophilic esophagitis    • Extremity pain    • Gestational diabetes    • Headache    • Influenza    • Labyrinthitis    • Low back pain    • Lumbar strain    • Lumbosacral disc disease    • Neck pain    • Right hip pain    • Shingles    • Strain of thoracic region    • Viral infection    • Viral pharyngitis       Allergies   Allergen Reactions   • Maxalt [Rizatriptan] Anaphylaxis   • Sulfa Antibiotics Rash   • Sumatriptan Anaphylaxis   • Zolmitriptan Anaphylaxis   • Labetalol Rash      Social History     Tobacco Use   • Smoking status: Former Smoker     Packs/day: 1.00     Years: 3.00     Pack years: 3.00     Types: Cigarettes     Quit date: 2009     Years since quittin.1   • Smokeless tobacco: Never Used   Vaping Use   • Vaping Use: Never used   Substance Use Topics   • Alcohol use: Yes     Comment: social, not frequent   • Drug use: No           Current Outpatient Medications:   •  Acetylcysteine (NAC) capsule capsule, Take  by mouth., Disp: , Rfl:   •  albuterol sulfate  (90 Base) MCG/ACT inhaler, Inhale 2 puffs Every 4 (Four) Hours As Needed for Wheezing or Shortness of Air., Disp: 18 g, Rfl: 11  •  albuterol sulfate  (90 Base) MCG/ACT inhaler, Inhale 2 puffs Every 4 (Four) Hours As Needed for Wheezing or Shortness of Air., Disp: 18 g, Rfl: 0  •  ALPRAZolam (XANAX) 0.25 MG tablet, Take 1 tablet by mouth At Night As Needed for Anxiety or Sleep. One po bid prn, Disp: 30 tablet, Rfl: 0  •  aspirin 81 MG EC tablet, Take 81 mg by mouth Daily., Disp: , Rfl:   •  azithromycin (Zithromax Z-Selvin) 250 MG tablet, Take 2 tablets the first day, then 1 tablet daily for 4 days., Disp: 6 tablet, Rfl: 0  •  benzonatate (TESSALON) 100 MG capsule, Take 1 capsule by mouth 3 (Three) Times a Day As Needed for Cough., Disp: 21 capsule, Rfl: 0  •  buPROPion XL  (WELLBUTRIN XL) 150 MG 24 hr tablet, Take 150 mg by mouth Daily., Disp: , Rfl:   •  cetirizine (zyrTEC) 10 MG tablet, Take 10 mg by mouth Daily., Disp: , Rfl:   •  fluticasone (FLONASE) 50 MCG/ACT nasal spray, 2 sprays into the nostril(s) as directed by provider., Disp: , Rfl:   •  hydroCHLOROthiazide (HYDRODIURIL) 25 MG tablet, TAKE 1 TABLET BY MOUTH DAILY, Disp: 90 tablet, Rfl: 3  •  L-methylfolate Calcium 15 MG tablet, Take 1 tablet by mouth Daily., Disp: , Rfl:   •  MAGNESIUM PO, Take  by mouth., Disp: , Rfl:   •  methylPREDNISolone (MEDROL) 4 MG dose pack, Take as directed on package instructions., Disp: 1 each, Rfl: 0  •  Omega 3-6-9 Fatty Acids (OMEGA 3-6-9 COMPLEX PO), Take  by mouth., Disp: , Rfl:   •  POTASSIUM PO, Take  by mouth., Disp: , Rfl:   •  promethazine (PHENERGAN) 25 MG tablet, Take 1 tablet by mouth Every 8 (Eight) Hours As Needed for Nausea or Vomiting., Disp: 21 tablet, Rfl: 2  •  propranolol (INDERAL) 10 MG tablet, TAKE 1 TABLET BY MOUTH TWICE DAILY, Disp: 60 tablet, Rfl: 5  •  vitamin C (ASCORBIC ACID) 250 MG tablet, Take 250 mg by mouth Daily., Disp: , Rfl:   •  vitamin D (ERGOCALCIFEROL) 1.25 MG (71038 UT) capsule capsule, Take 50,000 Units by mouth Every 7 (Seven) Days., Disp: , Rfl:       Physical Exam  Vitals reviewed.   HENT:      Mouth/Throat:      Comments: Positive hoarseness of voice  Pulmonary:      Effort: Pulmonary effort is normal. No respiratory distress.   Neurological:      Mental Status: She is alert and oriented to person, place, and time.   Psychiatric:         Mood and Affect: Mood normal.         Result Review :            Assessment and Plan    Diagnoses and all orders for this visit:    1. URI with cough and congestion  -     benzonatate (TESSALON) 100 MG capsule; Take 1 capsule by mouth 3 (Three) Times a Day As Needed for Cough.  Dispense: 21 capsule; Refill: 0  -     cefdinir (OMNICEF) 300 MG capsule; Take 1 capsule by mouth 2 (Two) Times a Day for 7 days.   Dispense: 14 capsule; Refill: 0        I discussed the patients findings and my recommendations with patient.  Patient was encouraged to keep me informed of any acute changes, lack of improvement, or any new concerning symptoms.  Patient voiced understanding of all instructions and denied further questions.      Follow Up   Return if symptoms worsen or fail to improve.      Electronically signed by:    CLAY Bagley  10/07/2022

## 2022-12-15 NOTE — PROGRESS NOTES
Orthopaedic Clinic Note: Knee Established Patient    Chief Complaint   Patient presents with   • Right Knee - Pain        HPI    It has been 1  month(s) since Ms. Maguire's last visit. She returns to clinic today for new complaint of right knee pain.  I last saw her for left knee pain and she completed a viscosupplementation series.  She states that the Visco injections in the left knee provided excellent relief and she is now having no pain in that knee.  She is now complaining of similar symptoms on the right knee with pain localized primarily anteriorly medially.  She rates pain 2/10 on the pain scale.  With physical activity, the pain significantly increases she develops swelling and stiffness in the knee.  Walking weightbearing increases her pain.  Sitting and resting eases her pain.  Overall she is doing well in regards to the left knee, but this ongoing 1 month duration of right knee pain is limiting daily activities.    Past Medical History:   Diagnosis Date   • Abdominal pain    • Abdominal pain, RUQ     Check cmp. refer to [facility] u/s and gen surg eval d/t cholelithiasis noted on CT of abd/pelvis donein ER 1/16   • Acute bronchitis     Take cefdinir and medrol dose pack as directed. Use otc mucinex. Continue to rest and push fluids. Call or rtc if no better. Gave phenergan w/ codeine cough syrup 5 ml po prn #120ml, no RF per Dr. Cesar   • Acute pharyngitis     Check for monod/t exposure/ Pt will restart flovent as used for esophagitis. If no improvement take the medrol dose pack she was given at least visit   • Acute sinusitis    • Acute upper respiratory infection    • Anxiety    • Body aches    • Cholelithiasis     Reviewed CT of adb/pelvis from ER 1/16 showed cholelithiasis. Will refer for gen surgery eval and [facility] u/s   • Chronic pain disorder    • Cough    • Eosinophilic esophagitis    • Extremity pain    • Gestational diabetes    • Headache    • Influenza    • Labyrinthitis    • Low back pain    • Lumbar  strain    • Lumbosacral disc disease    • Neck pain    • Right hip pain    • Shingles    • Strain of thoracic region    • Viral infection    • Viral pharyngitis       Past Surgical History:   Procedure Laterality Date   • BREAST BIOPSY Left 10/2018    Benign STEREO    •  SECTION      97, 00, 04, 12   • DILATATION AND CURETTAGE     • ENDOSCOPY     • OOPHORECTOMY Right 2017   • WISDOM TOOTH EXTRACTION        Family History   Problem Relation Age of Onset   • Arthritis Mother    • Hypertension Mother    • Thyroid disease Mother    • Arthritis Father    • Hypertension Father    • Heart disease Father    • Heart attack Other    • Arthritis Other    • Hypertension Other    • Migraines Other    • Stroke Other    • Breast cancer Other 31   • Ovarian cancer Cousin 42   • Heart failure Paternal Grandmother    • Endometrial cancer Neg Hx    • Colon cancer Neg Hx      Social History     Socioeconomic History   • Marital status:      Spouse name: Not on file   • Number of children: Not on file   • Years of education: Not on file   • Highest education level: Not on file   Occupational History   • Occupation:    Tobacco Use   • Smoking status: Former Smoker     Packs/day: 1.00     Years: 3.00     Pack years: 3.00     Types: Cigarettes     Quit date: 2009     Years since quittin.4   • Smokeless tobacco: Never Used   Substance and Sexual Activity   • Alcohol use: Yes     Comment: social   • Drug use: No   • Sexual activity: Defer   Lifestyle   • Physical activity     Days per week: 0 days     Minutes per session: Not on file   • Stress: Very much   Social History Narrative    Caffeine: 2 serving per day.      Current Outpatient Medications on File Prior to Visit   Medication Sig Dispense Refill   • albuterol sulfate  (90 Base) MCG/ACT inhaler Inhale 2 puffs Every 4 (Four) Hours As Needed for Wheezing or Shortness of Air. 18 g 11   • ALPRAZolam (XANAX) 0.25 MG tablet  Take 1 tablet by mouth At Night As Needed for Anxiety or Sleep. One po bid prn 30 tablet 0   • aspirin  MG tablet Take 325 mg by mouth Every 6 (Six) Hours As Needed.     • buPROPion XL (Wellbutrin XL) 150 MG 24 hr tablet Take 150 mg by mouth Daily.     • cetirizine (zyrTEC) 10 MG tablet Take 10 mg by mouth Daily.     • Ergocalciferol (VITAMIN D2 PO) Take 1 capsule by mouth.     • fluticasone (FLONASE) 50 MCG/ACT nasal spray 2 sprays into the nostril(s) as directed by provider.     • hydroCHLOROthiazide (HYDRODIURIL) 12.5 MG tablet Take 1 tablet by mouth Daily. 30 tablet 11   • L-methylfolate Calcium 15 MG tablet Take 1 tablet by mouth Daily.     • meclizine (ANTIVERT) 25 MG tablet Take 1 tablet by mouth 3 (Three) Times a Day As Needed for Dizziness. 20 tablet 0   • meloxicam (MOBIC) 15 MG tablet 1 PO Daily with food. 60 tablet 0   • methocarbamol (ROBAXIN) 750 MG tablet Take 1 tablet by mouth 4 (Four) Times a Day As Needed for Muscle Spasms. 90 tablet 0   • Multiple Vitamins-Minerals (MULTIVITAMIN ADULT PO) Take  by mouth.     • promethazine (PHENERGAN) 25 MG tablet Take 1 tablet by mouth Every 8 (Eight) Hours As Needed for Nausea or Vomiting. 21 tablet 2   • propranolol (INDERAL) 10 MG tablet Take 1 tablet by mouth 2 (Two) Times a Day. 60 tablet 11   • vitamin B-12 (CYANOCOBALAMIN) 1000 MCG tablet Take 1,000 mcg by mouth Daily.       No current facility-administered medications on file prior to visit.       Allergies   Allergen Reactions   • Maxalt [Rizatriptan] Anaphylaxis   • Sulfa Antibiotics Rash   • Sumatriptan Anaphylaxis   • Zolmitriptan Anaphylaxis   • Labetalol Rash        Review of Systems   Constitutional: Negative.    HENT: Negative.    Eyes: Negative.    Respiratory: Negative.    Cardiovascular: Negative.    Gastrointestinal: Negative.    Endocrine: Negative.    Genitourinary: Negative.    Musculoskeletal: Positive for arthralgias.   Skin: Negative.    Allergic/Immunologic: Negative.   "  Neurological: Negative.    Hematological: Negative.    Psychiatric/Behavioral: Negative.         The patient's Review of Systems was personally reviewed and confirmed as accurate.    Physical Exam  Pulse 93, height 162.6 cm (64.02\"), weight 83.9 kg (184 lb 15.5 oz), SpO2 98 %, not currently breastfeeding.    Body mass index is 31.73 kg/m².    GENERAL APPEARANCE: awake, alert, oriented, in no acute distress and well developed, well nourished  LUNGS:  breathing nonlabored  EXTREMITIES: no clubbing, cyanosis  PERIPHERAL PULSES: palpable dorsalis pedis and posterior tibial pulses bilaterally.    GAIT:  Normal        ----------  Right Knee Exam:  ----------  ALIGNMENT: neutral  ----------  RANGE OF MOTION:  Normal (0-120 degrees) with no extensor lag or flexion contracture  LIGAMENTOUS STABILITY:   stable to varus and valgus stress at terminal extension and 30 degrees without any evidence of laxity  ----------  STRENGTH:  KNEE FLEXION 5/5  KNEE EXTENSION  5/5  ANKLE DORSIFLEXION  5/5  ANKLE PLANTARFLEXION  5/5  ----------  PAIN WITH PALPATION:medial joint line and anterior knee  KNEE EFFUSION: yes, trace effusion  PAIN WITH KNEE ROM: no  PATELLAR CREPITUS:  yes, painful and symptomatic  ----------  SENSATION TO LIGHT TOUCH:  DEEP PERONEAL/SUPERFICIAL PERONEAL/SURAL/SAPHENOUS/TIBIAL:    intact  ----------  EDEMA:  no  ERYTHEMA:    no  WOUNDS/INCISIONS:   no  _____________________________________________________________________  _____________________________________________________________________    RADIOGRAPHIC FINDINGS:   Indication: Right knee pain    Comparison: No prior xrays are available for comparison    Knee films: mild to moderate tricompartmental osteoarthritis most notable in the patellofemoral compartment with small periarticular osteophytes.  No acute bony injury or fracture.    Assessment/Plan:   Diagnosis Plan   1. Primary osteoarthritis of right knee  Large Joint Arthrocentesis   2. Right knee pain, " unspecified chronicity  XR Knee 4+ View Right     Patient has right knee osteoarthritis that is symptomatic.  Given the excellent relief she received with the prior viscosupplementation injections in the left knee, she is interested in pursuing viscosupplementation injections in the right knee.  I believe this reasonable option.  We will obtain insurance preauthorization initiate injections after insurance approval.      Shay Rock MD  01/21/21  08:20 EST   Cheilitis Aggressive Treatment: I recommended application of Vaseline or Aquaphor numerous times a day (as often as every hour) and before going to bed. I also prescribed a topical steroid for twice daily use.

## 2022-12-20 ENCOUNTER — OFFICE VISIT (OUTPATIENT)
Dept: ORTHOPEDIC SURGERY | Facility: CLINIC | Age: 52
End: 2022-12-20

## 2022-12-20 VITALS
WEIGHT: 175 LBS | BODY MASS INDEX: 31.01 KG/M2 | HEIGHT: 63 IN | DIASTOLIC BLOOD PRESSURE: 88 MMHG | SYSTOLIC BLOOD PRESSURE: 146 MMHG

## 2022-12-20 DIAGNOSIS — M17.12 PRIMARY OSTEOARTHRITIS OF LEFT KNEE: ICD-10-CM

## 2022-12-20 DIAGNOSIS — M17.11 PRIMARY OSTEOARTHRITIS OF RIGHT KNEE: Primary | ICD-10-CM

## 2022-12-20 PROCEDURE — 99214 OFFICE O/P EST MOD 30 MIN: CPT | Performed by: ORTHOPAEDIC SURGERY

## 2022-12-20 RX ORDER — MELOXICAM 15 MG/1
TABLET ORAL
Qty: 90 TABLET | Refills: 1 | Status: SHIPPED | OUTPATIENT
Start: 2022-12-20

## 2022-12-20 NOTE — PROGRESS NOTES
Orthopaedic Clinic Note: Knee Established Patient    Chief Complaint   Patient presents with   • Left Knee - Pain     Bilateral primary osteoarthritis of knee, Orthovisc series completed 12/15/2020   • Right Knee - Pain     Bilateral primary osteoarthritis of knee, Orthovisc series completed 3/25/2021        HPI    It has been 21  month(s) since Ms. Maguire's last visit. She returns to clinic today for follow-up bilateral knee pain.  She has previously been seen for osteoarthritis of bilateral knees and completed viscosupplementation injections.  She states the injections worked well for nearly a year.  She comes clinic today complaining of recurrent pain that she rates a 2/10 on the pain scale.  She is having aching and stiffness in both knees as well as a mechanical catching sensation in the left knee that she localizes the posterior lateral aspect of the knee.  She states it occurs when she flexes the knee and feels a pop followed by release of pressure.  This has been ongoing for over a year.  She is here today to discuss treatment for ongoing knee pain.    Past Medical History:   Diagnosis Date   • Abdominal pain    • Abdominal pain, RUQ     Check cmp. refer to [facility] u/s and gen surg eval d/t cholelithiasis noted on CT of abd/pelvis donein ER 1/16   • Acute bronchitis     Take cefdinir and medrol dose pack as directed. Use otc mucinex. Continue to rest and push fluids. Call or rtc if no better. Gave phenergan w/ codeine cough syrup 5 ml po prn #120ml, no RF per Dr. Cesar   • Acute pharyngitis     Check for monod/t exposure/ Pt will restart flovent as used for esophagitis. If no improvement take the medrol dose pack she was given at least visit   • Acute sinusitis    • Acute upper respiratory infection    • Anxiety    • Body aches    • Cholelithiasis     Reviewed CT of adb/pelvis from ER 1/16 showed cholelithiasis. Will refer for gen surgery eval and [facility] u/s   • Chronic pain disorder    • Cough    • Eosinophilic  esophagitis    • Extremity pain    • Gestational diabetes    • Headache    • Influenza    • Labyrinthitis    • Low back pain    • Lumbar strain    • Lumbosacral disc disease    • Neck pain    • Right hip pain    • Shingles    • Strain of thoracic region    • Viral infection    • Viral pharyngitis       Past Surgical History:   Procedure Laterality Date   • BREAST BIOPSY Left 10/2018    Benign STEREO    •  SECTION      97, 00, 04, 12   • CHOLECYSTECTOMY     • DILATATION AND CURETTAGE     • ENDOSCOPY     • OOPHORECTOMY Right 2017    due to ovarian cyst   • ROTATOR CUFF REPAIR Left 2022   • WISDOM TOOTH EXTRACTION        Family History   Problem Relation Age of Onset   • Arthritis Mother    • Hypertension Mother    • Thyroid disease Mother    • Lung cancer Mother         adenocarcinoma, nonsmoking   • Arthritis Father    • Hypertension Father    • Heart disease Father    • Stroke Father    • Skin cancer Father         nonmelanoma   • Arthritis Sister    • Kidney disease Sister    • Liver disease Sister    • Breast cancer Sister 62   • Valvular heart disease Maternal Grandmother    • Arrhythmia Maternal Grandmother         had pacemaker   • Diabetes Maternal Grandfather    • Heart disease Maternal Grandfather         numerous heart attacks   • Heart failure Paternal Grandmother    • Heart disease Paternal Grandmother    • Throat cancer Paternal Grandfather    • Ovarian cancer Cousin 42   • Heart attack Other    • Arthritis Other    • Hypertension Other    • Migraines Other    • Stroke Other    • Arthritis Brother    • Allergic rhinitis Brother    • Lung disease Brother         occupational, black lung   • Breast cancer Niece 31   • Endometrial cancer Neg Hx    • Colon cancer Neg Hx      Social History     Socioeconomic History   • Marital status:    Tobacco Use   • Smoking status: Former     Packs/day: 1.00     Years: 3.00     Pack years: 3.00     Types: Cigarettes      Quit date: 2009     Years since quittin.3   • Smokeless tobacco: Never   Vaping Use   • Vaping Use: Never used   Substance and Sexual Activity   • Alcohol use: Yes     Comment: social, not frequent   • Drug use: No   • Sexual activity: Defer      Current Outpatient Medications on File Prior to Visit   Medication Sig Dispense Refill   • Acetylcysteine (NAC) capsule capsule Take  by mouth.     • albuterol sulfate  (90 Base) MCG/ACT inhaler Inhale 2 puffs Every 4 (Four) Hours As Needed for Wheezing or Shortness of Air. 18 g 11   • albuterol sulfate  (90 Base) MCG/ACT inhaler Inhale 2 puffs Every 4 (Four) Hours As Needed for Wheezing or Shortness of Air. 18 g 0   • ALPRAZolam (XANAX) 0.25 MG tablet Take 1 tablet by mouth At Night As Needed for Anxiety or Sleep. One po bid prn 30 tablet 0   • aspirin 81 MG EC tablet Take 81 mg by mouth Daily.     • benzonatate (TESSALON) 100 MG capsule Take 1 capsule by mouth 3 (Three) Times a Day As Needed for Cough. 21 capsule 0   • buPROPion XL (WELLBUTRIN XL) 150 MG 24 hr tablet Take 150 mg by mouth Daily.     • cetirizine (zyrTEC) 10 MG tablet Take 10 mg by mouth Daily.     • fluticasone (FLONASE) 50 MCG/ACT nasal spray 2 sprays into the nostril(s) as directed by provider.     • hydroCHLOROthiazide (HYDRODIURIL) 25 MG tablet TAKE 1 TABLET BY MOUTH DAILY 90 tablet 3   • L-methylfolate Calcium 15 MG tablet Take 1 tablet by mouth Daily.     • MAGNESIUM PO Take  by mouth.     • Omega 3-6-9 Fatty Acids (OMEGA 3-6-9 COMPLEX PO) Take  by mouth.     • POTASSIUM PO Take  by mouth.     • promethazine (PHENERGAN) 25 MG tablet Take 1 tablet by mouth Every 8 (Eight) Hours As Needed for Nausea or Vomiting. 21 tablet 2   • propranolol (INDERAL) 10 MG tablet TAKE 1 TABLET BY MOUTH TWICE DAILY 60 tablet 5   • vitamin C (ASCORBIC ACID) 250 MG tablet Take 250 mg by mouth Daily.     • vitamin D (ERGOCALCIFEROL) 1.25 MG (74460 UT) capsule capsule Take 50,000 Units by mouth Every 7  "(Seven) Days.       No current facility-administered medications on file prior to visit.      Allergies   Allergen Reactions   • Maxalt [Rizatriptan] Anaphylaxis   • Sulfa Antibiotics Rash   • Sumatriptan Anaphylaxis   • Zolmitriptan Anaphylaxis   • Labetalol Rash        Review of Systems   Constitutional: Negative.    HENT: Negative.    Eyes: Negative.    Respiratory: Negative.    Cardiovascular: Negative.    Gastrointestinal: Negative.    Endocrine: Negative.    Genitourinary: Negative.    Musculoskeletal: Positive for arthralgias.   Skin: Negative.    Allergic/Immunologic: Negative.    Neurological: Negative.    Hematological: Negative.    Psychiatric/Behavioral: Negative.         The patient's Review of Systems was personally reviewed and confirmed as accurate.    Physical Exam  Blood pressure 146/88, height 160 cm (62.99\"), weight 79.4 kg (175 lb), not currently breastfeeding.    Body mass index is 31.01 kg/m².    GENERAL APPEARANCE: awake, alert, oriented, in no acute distress and well developed, well nourished  LUNGS:  breathing nonlabored  EXTREMITIES: no clubbing, cyanosis  PERIPHERAL PULSES: palpable dorsalis pedis and posterior tibial pulses bilaterally.    GAIT:  Normal        ----------  Bilateral Knee Exam:  ----------  ALIGNMENT: neutral  ----------  RANGE OF MOTION:  Normal (0-120 degrees) with no extensor lag or flexion contracture  LIGAMENTOUS STABILITY:   stable to varus and valgus stress at terminal extension and 30 degrees without any evidence of laxity  ----------  STRENGTH:  KNEE FLEXION 5/5  KNEE EXTENSION  5/5  ANKLE DORSIFLEXION  5/5  ANKLE PLANTARFLEXION  5/5  ----------  PAIN WITH PALPATION:anterior knee and popliteal region  KNEE EFFUSION: yes, trace effusion  PAIN WITH KNEE ROM: yes  PATELLAR CREPITUS:  yes, painful and symptomatic  ----------  SENSATION TO LIGHT TOUCH:  DEEP PERONEAL/SUPERFICIAL PERONEAL/SURAL/SAPHENOUS/TIBIAL:    intact  ----------  EDEMA:  no  ERYTHEMA:    " no  WOUNDS/INCISIONS:   no  _____________________________________________________________________  _____________________________________________________________________    RADIOGRAPHIC FINDINGS:   Indication: Bilateral knee pain    Comparison: Todays xrays were compared to previous xrays from 1/21/2021    Knee films: mild tricompartmental osteoarthritis with neutral alignment.  Small periarticular osteophytes visualized in all compartments.  No acute bony injury or fracture.  No significant change compared to prior radiographs.    Assessment/Plan:   Diagnosis Plan   1. Primary osteoarthritis of right knee  meloxicam (MOBIC) 15 MG tablet      2. Primary osteoarthritis of left knee  XR Knee 4+ View Bilateral    meloxicam (MOBIC) 15 MG tablet        Patient received excellent benefit from prior viscosupplementation series.  Unfortunately since her last visit, she is switched insurances and her current insurance, [employer], no longer covers viscosupplementation series.  I discussed with her out-of-pocket expenses for viscosupplementation versus trying a prescription anti-inflammatory.  She is agreeable to the prescription anti-inflammatory.  Meloxicam prescribed.  I also discussed her ongoing mechanical pain that she is experiencing in the left knee.  She does have a prior MRI that demonstrated no meniscal tear, but she has noticed the popping has become more frequent since having that MRI.  Ultimately if the anti-inflammatory fails to adequately improve her symptoms, an MRI of the left knee may be warranted to evaluate for lateral meniscus tear.  She will follow-up on an as-needed basis.      Shay Rock MD  12/20/22  16:06 EST

## 2023-03-20 ENCOUNTER — TELEPHONE (OUTPATIENT)
Dept: ORTHOPEDIC SURGERY | Facility: CLINIC | Age: 53
End: 2023-03-20

## 2023-03-20 NOTE — TELEPHONE ENCOUNTER
"  Caller: Gino Maguire    Relationship to patient: Self    Best call back number: [phone]     Type of visit: FOLLOW UP / BILATERAL KNEE PAIN, LEFT > RIGHT / WANTS BILATERAL INJECTIONS & LEFT KNEE MRI     Requested date: UNAVAILABLE BETWEEN [id number] (COULD DO MORNING APPT TO BE DONE BY 1300 OR AFTERNOON APPT w/ARRIVAL 1545 OR LATER)     Additional notes: PATIENT WISHES TO PROCEED w/GETTING BILATERAL KNEE INJECTIONS & LEFT KNEE MRI AS DISCUSSED w/DR ORDOÑEZ @ 12-20-22 APPT     PATIENT FINE w/GETTING BILATERAL KNEE INJECTIONS FIRST & LEFT KNEE MRI AFTERWARDS     OR TO HAVE LEFT KNEE MRI ORDERED / SCHEDULED / DONE FIRST & THEN DISCUSS RESULTS @FOLLOW UP / INJECTIONs APPT     (\"WHICHEVER DR ORDOÑEZ PREFERS\")     PLEASE CALL / LEAVE VMAIL TO DISCUSS SCHEDULING     THANKS   "

## 2023-04-06 ENCOUNTER — CLINICAL SUPPORT (OUTPATIENT)
Dept: ORTHOPEDIC SURGERY | Facility: CLINIC | Age: 53
End: 2023-04-06
Payer: COMMERCIAL

## 2023-04-06 DIAGNOSIS — M17.11 PRIMARY OSTEOARTHRITIS OF RIGHT KNEE: Primary | ICD-10-CM

## 2023-04-06 DIAGNOSIS — M17.12 PRIMARY OSTEOARTHRITIS OF LEFT KNEE: ICD-10-CM

## 2023-04-06 NOTE — PROGRESS NOTES
Procedure   - Large Joint Arthrocentesis: bilateral knee on 4/6/2023 7:55 AM  Indications: pain  Details: 22 G needle, anterolateral approach  Medications (Right): 30 mg Hyaluronan 30 MG/2ML  Medications (Left): 30 mg Hyaluronan 30 MG/2ML  Outcome: tolerated well, no immediate complications  Procedure, treatment alternatives, risks and benefits explained, specific risks discussed. Consent was given by the patient. Immediately prior to procedure a time out was called to verify the correct patient, procedure, equipment, support staff and site/side marked as required. Patient was prepped and draped in the usual sterile fashion.

## 2023-04-13 ENCOUNTER — CLINICAL SUPPORT (OUTPATIENT)
Dept: ORTHOPEDIC SURGERY | Facility: CLINIC | Age: 53
End: 2023-04-13
Payer: COMMERCIAL

## 2023-04-13 DIAGNOSIS — M17.12 PRIMARY OSTEOARTHRITIS OF LEFT KNEE: ICD-10-CM

## 2023-04-13 DIAGNOSIS — M17.11 PRIMARY OSTEOARTHRITIS OF RIGHT KNEE: Primary | ICD-10-CM

## 2023-04-13 NOTE — PROGRESS NOTES
Procedure   - Large Joint Arthrocentesis: bilateral knee on 4/13/2023 8:33 AM  Indications: pain  Details: 22 G needle, superolateral approach  Medications (Right): 30 mg Hyaluronan 30 MG/2ML  Medications (Left): 30 mg Hyaluronan 30 MG/2ML  Outcome: tolerated well, no immediate complications  Procedure, treatment alternatives, risks and benefits explained, specific risks discussed. Consent was given by the patient. Immediately prior to procedure a time out was called to verify the correct patient, procedure, equipment, support staff and site/side marked as required. Patient was prepped and draped in the usual sterile fashion.

## 2023-04-13 NOTE — PROGRESS NOTES
Patient returns for second set of bilateral knee viscosupplementation injections.  Denies interval since her last visit.  Overall she is doing better.    Procedure Note:  I discussed with the patient the potential benefits of performing a therapeutic injections of the bilateral knees as well as potential risks including but not limited to infection, swelling, pain, bleeding, bruising, nerve/vessel damage, pseudoseptic reaction, and worsening joint pain. After informed consent and verifying correct patient, procedure site, and type of procedure, the areas were prepped with alcohol, ethyl chloride was used to numb the skin. Via the superolateral approach, the viscosupplementation syringe contents were injected into each of the bilateral knees. The patient tolerated the procedures well. There were no complications. A sterile dressing was placed over each injection site.    Follow up 1 week.

## 2023-04-20 ENCOUNTER — CLINICAL SUPPORT (OUTPATIENT)
Dept: ORTHOPEDIC SURGERY | Facility: CLINIC | Age: 53
End: 2023-04-20
Payer: COMMERCIAL

## 2023-04-20 DIAGNOSIS — M17.12 PRIMARY OSTEOARTHRITIS OF LEFT KNEE: ICD-10-CM

## 2023-04-20 DIAGNOSIS — M17.11 PRIMARY OSTEOARTHRITIS OF RIGHT KNEE: Primary | ICD-10-CM

## 2023-04-20 NOTE — PROGRESS NOTES
Patient returns for third set of bilateral knee viscosupplementation injections.  Denies complications with her prior injections.  Overall her pain has improved.    Procedure Note:  I discussed with the patient the potential benefits of performing a therapeutic injections of the bilateral knees as well as potential risks including but not limited to infection, swelling, pain, bleeding, bruising, nerve/vessel damage, pseudoseptic reaction, and worsening joint pain. After informed consent and verifying correct patient, procedure site, and type of procedure, the areas were prepped with alcohol, ethyl chloride was used to numb the skin. Via the superolateral approach, the viscosupplementation syringe contents were injected into each of the bilateral knees. The patient tolerated the procedures well. There were no complications. A sterile dressing was placed over each injection site.    Follow up 6 months.

## 2023-04-20 NOTE — PROGRESS NOTES
Procedure   - Large Joint Arthrocentesis: bilateral knee on 4/20/2023 8:28 AM  Indications: pain  Details: 22 G needle, anterolateral approach  Medications (Right): 30 mg Hyaluronan 30 MG/2ML  Medications (Left): 30 mg Hyaluronan 30 MG/2ML  Outcome: tolerated well, no immediate complications  Procedure, treatment alternatives, risks and benefits explained, specific risks discussed. Consent was given by the patient. Immediately prior to procedure a time out was called to verify the correct patient, procedure, equipment, support staff and site/side marked as required. Patient was prepped and draped in the usual sterile fashion.

## 2023-05-03 ENCOUNTER — HOSPITAL ENCOUNTER (EMERGENCY)
Facility: HOSPITAL | Age: 53
Discharge: HOME | End: 2023-05-03
Attending: EMERGENCY MEDICINE
Payer: COMMERCIAL

## 2023-05-03 VITALS
BODY MASS INDEX: 31.54 KG/M2 | WEIGHT: 178 LBS | TEMPERATURE: 99.8 F | RESPIRATION RATE: 16 BRPM | HEIGHT: 63 IN | DIASTOLIC BLOOD PRESSURE: 81 MMHG | SYSTOLIC BLOOD PRESSURE: 135 MMHG | OXYGEN SATURATION: 98 % | HEART RATE: 101 BPM

## 2023-05-03 DIAGNOSIS — A08.4 VIRAL GASTROENTERITIS: Primary | ICD-10-CM

## 2023-05-03 DIAGNOSIS — E86.0 DEHYDRATION: ICD-10-CM

## 2023-05-03 LAB
ALBUMIN SERPL-MCNC: 5 G/DL (ref 3.5–5.2)
ALBUMIN/GLOB SERPL: 1.5 G/DL
ALP SERPL-CCNC: 125 U/L (ref 39–117)
ALT SERPL W P-5'-P-CCNC: 20 U/L (ref 1–33)
ANION GAP SERPL CALCULATED.3IONS-SCNC: 13 MMOL/L (ref 5–15)
AST SERPL-CCNC: 20 U/L (ref 1–32)
BASOPHILS # BLD AUTO: 0.02 10*3/MM3 (ref 0–0.2)
BASOPHILS NFR BLD AUTO: 0.2 % (ref 0–1.5)
BILIRUB SERPL-MCNC: 0.7 MG/DL (ref 0–1.2)
BILIRUB UR QL STRIP: NEGATIVE
BUN SERPL-MCNC: 17 MG/DL (ref 6–20)
BUN/CREAT SERPL: 17.9 (ref 7–25)
CALCIUM SPEC-SCNC: 10.2 MG/DL (ref 8.6–10.5)
CHLORIDE SERPL-SCNC: 103 MMOL/L (ref 98–107)
CLARITY UR: CLEAR
CO2 SERPL-SCNC: 24 MMOL/L (ref 22–29)
COLOR UR: YELLOW
CREAT SERPL-MCNC: 0.95 MG/DL (ref 0.57–1)
D-LACTATE SERPL-SCNC: 1.9 MMOL/L (ref 0.5–2)
DEPRECATED RDW RBC AUTO: 45.8 FL (ref 37–54)
EGFRCR SERPLBLD CKD-EPI 2021: 70.9 ML/MIN/1.73
EOSINOPHIL # BLD AUTO: 0.06 10*3/MM3 (ref 0–0.4)
EOSINOPHIL NFR BLD AUTO: 0.5 % (ref 0.3–6.2)
ERYTHROCYTE [DISTWIDTH] IN BLOOD BY AUTOMATED COUNT: 13.2 % (ref 12.3–15.4)
GLOBULIN UR ELPH-MCNC: 3.4 GM/DL
GLUCOSE SERPL-MCNC: 121 MG/DL (ref 65–99)
GLUCOSE UR STRIP-MCNC: NEGATIVE MG/DL
HCT VFR BLD AUTO: 47.3 % (ref 34–46.6)
HGB BLD-MCNC: 15.6 G/DL (ref 12–15.9)
HGB UR QL STRIP.AUTO: NEGATIVE
HOLD SPECIMEN: NORMAL
IMM GRANULOCYTES # BLD AUTO: 0.04 10*3/MM3 (ref 0–0.05)
IMM GRANULOCYTES NFR BLD AUTO: 0.3 % (ref 0–0.5)
KETONES UR QL STRIP: NEGATIVE
LEUKOCYTE ESTERASE UR QL STRIP.AUTO: NEGATIVE
LIPASE SERPL-CCNC: 16 U/L (ref 13–60)
LYMPHOCYTES # BLD AUTO: 0.43 10*3/MM3 (ref 0.7–3.1)
LYMPHOCYTES NFR BLD AUTO: 3.5 % (ref 19.6–45.3)
MCH RBC QN AUTO: 31 PG (ref 26.6–33)
MCHC RBC AUTO-ENTMCNC: 33 G/DL (ref 31.5–35.7)
MCV RBC AUTO: 94 FL (ref 79–97)
MONOCYTES # BLD AUTO: 0.35 10*3/MM3 (ref 0.1–0.9)
MONOCYTES NFR BLD AUTO: 2.9 % (ref 5–12)
NEUTROPHILS NFR BLD AUTO: 11.22 10*3/MM3 (ref 1.7–7)
NEUTROPHILS NFR BLD AUTO: 92.6 % (ref 42.7–76)
NITRITE UR QL STRIP: NEGATIVE
NRBC BLD AUTO-RTO: 0 /100 WBC (ref 0–0.2)
PH UR STRIP.AUTO: 6.5 [PH] (ref 5–8)
PLATELET # BLD AUTO: 303 10*3/MM3 (ref 140–450)
PMV BLD AUTO: 9.7 FL (ref 6–12)
POTASSIUM SERPL-SCNC: 4.4 MMOL/L (ref 3.5–5.2)
PROT SERPL-MCNC: 8.4 G/DL (ref 6–8.5)
PROT UR QL STRIP: NEGATIVE
RBC # BLD AUTO: 5.03 10*6/MM3 (ref 3.77–5.28)
SODIUM SERPL-SCNC: 140 MMOL/L (ref 136–145)
SP GR UR STRIP: 1.01 (ref 1–1.03)
UROBILINOGEN UR QL STRIP: NORMAL
WBC NRBC COR # BLD: 12.12 10*3/MM3 (ref 3.4–10.8)
WHOLE BLOOD HOLD COAG: NORMAL
WHOLE BLOOD HOLD SPECIMEN: NORMAL

## 2023-05-03 PROCEDURE — 81003 URINALYSIS AUTO W/O SCOPE: CPT | Performed by: EMERGENCY MEDICINE

## 2023-05-03 PROCEDURE — 25010000002 PROMETHAZINE PER 50 MG: Performed by: EMERGENCY MEDICINE

## 2023-05-03 PROCEDURE — 25010000002 ONDANSETRON PER 1 MG: Performed by: EMERGENCY MEDICINE

## 2023-05-03 PROCEDURE — 96375 TX/PRO/DX INJ NEW DRUG ADDON: CPT

## 2023-05-03 PROCEDURE — 96374 THER/PROPH/DIAG INJ IV PUSH: CPT

## 2023-05-03 PROCEDURE — 85025 COMPLETE CBC W/AUTO DIFF WBC: CPT | Performed by: EMERGENCY MEDICINE

## 2023-05-03 PROCEDURE — 83605 ASSAY OF LACTIC ACID: CPT | Performed by: EMERGENCY MEDICINE

## 2023-05-03 PROCEDURE — 99283 EMERGENCY DEPT VISIT LOW MDM: CPT

## 2023-05-03 PROCEDURE — 25010000002 METOCLOPRAMIDE PER 10 MG: Performed by: EMERGENCY MEDICINE

## 2023-05-03 PROCEDURE — 25010000002 DIPHENHYDRAMINE PER 50 MG: Performed by: EMERGENCY MEDICINE

## 2023-05-03 PROCEDURE — 80053 COMPREHEN METABOLIC PANEL: CPT | Performed by: EMERGENCY MEDICINE

## 2023-05-03 PROCEDURE — 83690 ASSAY OF LIPASE: CPT | Performed by: EMERGENCY MEDICINE

## 2023-05-03 RX ORDER — ONDANSETRON 4 MG/1
4 TABLET, ORALLY DISINTEGRATING ORAL EVERY 6 HOURS PRN
Qty: 15 TABLET | Refills: 0 | Status: SHIPPED | OUTPATIENT
Start: 2023-05-03

## 2023-05-03 RX ORDER — ONDANSETRON 2 MG/ML
4 INJECTION INTRAMUSCULAR; INTRAVENOUS ONCE
Status: COMPLETED | OUTPATIENT
Start: 2023-05-03 | End: 2023-05-03

## 2023-05-03 RX ORDER — METOCLOPRAMIDE HYDROCHLORIDE 5 MG/ML
10 INJECTION INTRAMUSCULAR; INTRAVENOUS ONCE
Status: COMPLETED | OUTPATIENT
Start: 2023-05-03 | End: 2023-05-03

## 2023-05-03 RX ORDER — DIPHENHYDRAMINE HYDROCHLORIDE 50 MG/ML
25 INJECTION INTRAMUSCULAR; INTRAVENOUS ONCE
Status: COMPLETED | OUTPATIENT
Start: 2023-05-03 | End: 2023-05-03

## 2023-05-03 RX ORDER — SODIUM CHLORIDE 9 MG/ML
10 INJECTION INTRAVENOUS AS NEEDED
Status: DISCONTINUED | OUTPATIENT
Start: 2023-05-03 | End: 2023-05-03 | Stop reason: HOSPADM

## 2023-05-03 RX ORDER — PROMETHAZINE HYDROCHLORIDE 25 MG/1
25 SUPPOSITORY RECTAL EVERY 6 HOURS PRN
Qty: 10 SUPPOSITORY | Refills: 0 | Status: SHIPPED | OUTPATIENT
Start: 2023-05-03

## 2023-05-03 RX ORDER — PROMETHAZINE HYDROCHLORIDE 25 MG/1
25 TABLET ORAL EVERY 6 HOURS PRN
Qty: 15 TABLET | Refills: 0 | Status: SHIPPED | OUTPATIENT
Start: 2023-05-03

## 2023-05-03 RX ORDER — FAMOTIDINE 10 MG/ML
20 INJECTION, SOLUTION INTRAVENOUS ONCE
Status: COMPLETED | OUTPATIENT
Start: 2023-05-03 | End: 2023-05-03

## 2023-05-03 RX ADMIN — PROMETHAZINE HYDROCHLORIDE 12.5 MG: 25 INJECTION INTRAMUSCULAR; INTRAVENOUS at 16:52

## 2023-05-03 RX ADMIN — SODIUM CHLORIDE, POTASSIUM CHLORIDE, SODIUM LACTATE AND CALCIUM CHLORIDE 2000 ML: 600; 310; 30; 20 INJECTION, SOLUTION INTRAVENOUS at 12:26

## 2023-05-03 RX ADMIN — ONDANSETRON 4 MG: 2 INJECTION INTRAMUSCULAR; INTRAVENOUS at 12:33

## 2023-05-03 RX ADMIN — METOCLOPRAMIDE 10 MG: 5 INJECTION, SOLUTION INTRAMUSCULAR; INTRAVENOUS at 15:50

## 2023-05-03 RX ADMIN — SODIUM CHLORIDE 1000 ML: 9 INJECTION, SOLUTION INTRAVENOUS at 15:49

## 2023-05-03 RX ADMIN — DIPHENHYDRAMINE HYDROCHLORIDE 25 MG: 50 INJECTION INTRAMUSCULAR; INTRAVENOUS at 15:51

## 2023-05-03 RX ADMIN — FAMOTIDINE 20 MG: 10 INJECTION INTRAVENOUS at 15:49

## 2023-05-03 NOTE — ED PROVIDER NOTES
Subjective   History of Present Illness    Woke up this am with nausea vomiting and diarrhea and general malaise.  Nonstop since 0330.  Lightheaded and she fainted once.  Some myalgias.  No abdominal pain.  Chills, no fever.  Hit her head on a garbage can when she fainted.    PMH HTN depression.  CCY    History provided by:  Patient      Review of Systems   Constitutional: Negative for fever.   Respiratory: Negative for cough and shortness of breath.    Cardiovascular: Negative for chest pain.   Gastrointestinal: Positive for diarrhea, nausea and vomiting. Negative for abdominal pain.   Neurological: Positive for syncope.   All other systems reviewed and are negative.      Past Medical History:   Diagnosis Date   • Abdominal pain    • Abdominal pain, RUQ     Check cmp. refer to GB u/s and gen surg eval d/t cholelithiasis noted on CT of abd/pelvis donein ER 1/16   • Acute bronchitis     Take cefdinir and medrol dose pack as directed. Use otc mucinex. Continue to rest and push fluids. Call or rtc if no better. Gave phenergan w/ codeine cough syrup 5 ml po prn #120ml, no RF per Dr. Cesar   • Acute pharyngitis     Check for monod/t exposure/ Pt will restart flovent as used for esophagitis. If no improvement take the medrol dose pack she was given at least visit   • Acute sinusitis    • Acute upper respiratory infection    • Anxiety    • Body aches    • Cholelithiasis     Reviewed CT of adb/pelvis from ER 1/16 showed cholelithiasis. Will refer for gen surgery eval and GB u/s   • Chronic pain disorder    • Cough    • Eosinophilic esophagitis    • Extremity pain    • Gestational diabetes    • Headache    • Influenza    • Labyrinthitis    • Low back pain    • Lumbar strain    • Lumbosacral disc disease    • Neck pain    • Right hip pain    • Shingles    • Strain of thoracic region    • Viral infection    • Viral pharyngitis        Allergies   Allergen Reactions   • Maxalt [Rizatriptan] Anaphylaxis   • Sulfa Antibiotics  Rash   • Sumatriptan Anaphylaxis   • Zolmitriptan Anaphylaxis   • Labetalol Rash       Past Surgical History:   Procedure Laterality Date   • BREAST BIOPSY Left 10/2018    Benign STEREO    •  SECTION      97, 00, 04, 10   • CHOLECYSTECTOMY  2017   • DILATATION AND CURETTAGE     • ENDOSCOPY     • OOPHORECTOMY Right 2017    due to ovarian cyst   • ROTATOR CUFF REPAIR Left 2022   • WISDOM TOOTH EXTRACTION         Family History   Problem Relation Age of Onset   • Arthritis Mother    • Hypertension Mother    • Thyroid disease Mother    • Lung cancer Mother         adenocarcinoma, nonsmoking   • Arthritis Father    • Hypertension Father    • Heart disease Father    • Stroke Father    • Skin cancer Father         nonmelanoma   • Arthritis Sister    • Kidney disease Sister    • Liver disease Sister    • Breast cancer Sister 62   • Valvular heart disease Maternal Grandmother    • Arrhythmia Maternal Grandmother         had pacemaker   • Diabetes Maternal Grandfather    • Heart disease Maternal Grandfather         numerous heart attacks   • Heart failure Paternal Grandmother    • Heart disease Paternal Grandmother    • Throat cancer Paternal Grandfather    • Ovarian cancer Cousin 42   • Heart attack Other    • Arthritis Other    • Hypertension Other    • Migraines Other    • Stroke Other    • Arthritis Brother    • Allergic rhinitis Brother    • Lung disease Brother         occupational, black lung   • Breast cancer Niece 31   • Endometrial cancer Neg Hx    • Colon cancer Neg Hx        Social History     Socioeconomic History   • Marital status:    Tobacco Use   • Smoking status: Former     Packs/day: 1.00     Years: 3.00     Pack years: 3.00     Types: Cigarettes     Quit date: 2009     Years since quittin.7   • Smokeless tobacco: Never   Vaping Use   • Vaping Use: Never used   Substance and Sexual Activity   • Alcohol use: Yes     Comment: social, not frequent   • Drug  use: No   • Sexual activity: Defer           Objective   Physical Exam  Vitals and nursing note reviewed.   Constitutional:       General: She is not in acute distress.     Appearance: Normal appearance. She is ill-appearing. She is not toxic-appearing.   HENT:      Head: Normocephalic.      Comments: L forehead abrasions     Mouth/Throat:      Mouth: Mucous membranes are moist.   Eyes:      General: No scleral icterus.        Right eye: No discharge.         Left eye: No discharge.      Conjunctiva/sclera: Conjunctivae normal.   Cardiovascular:      Rate and Rhythm: Regular rhythm. Tachycardia present.      Heart sounds: No murmur heard.  Pulmonary:      Effort: Pulmonary effort is normal. No respiratory distress.      Breath sounds: Normal breath sounds. No wheezing.   Abdominal:      General: Bowel sounds are normal. There is no distension.      Palpations: Abdomen is soft.      Tenderness: There is no abdominal tenderness. There is no guarding or rebound.   Musculoskeletal:         General: No swelling. Normal range of motion.      Cervical back: Normal range of motion and neck supple.   Skin:     General: Skin is warm and dry.      Coloration: Skin is pale.      Findings: No rash.   Neurological:      General: No focal deficit present.      Mental Status: She is alert and oriented to person, place, and time. Mental status is at baseline.   Psychiatric:         Mood and Affect: Mood normal.         Behavior: Behavior normal.         Thought Content: Thought content normal.         Procedures           ED Course         Labs benign.  2L saline and some Zofran given and she felt somewhat better but then nausea returned.  Reglan given and another liter of saline without relief.  UA obtained and is normal, hydrated.  She continues to deny pain.  Phenergan IV given with significant relief and she feels well enough to go home.    Patient stable on serial rechecks.  Discussed findings, concerns, plan of care, expected  course, reasons to return and followup.  Provided the opportunity to ask questions.                                    Medical Decision Making  Dehydration: acute illness or injury  Viral gastroenteritis: acute illness or injury  Amount and/or Complexity of Data Reviewed  Labs: ordered. Decision-making details documented in ED Course.      Risk  Prescription drug management.  Decision regarding hospitalization.          Final diagnoses:   Viral gastroenteritis   Dehydration       ED Disposition  ED Disposition     ED Disposition   Discharge    Condition   Stable    Comment   --             Stacie Duran MD  [address] [facility] [address] [phone]    In 1 week      [facility] Emergency Department  [address] [facility] [address] [phone]    If symptoms worsen         Medication List      New Prescriptions    ondansetron ODT 4 MG disintegrating tablet  Commonly known as: ZOFRAN-ODT  Place 1 tablet on the tongue Every 6 (Six) Hours As Needed for Nausea or Vomiting.        Changed    * promethazine 25 MG tablet  Commonly known as: PHENERGAN  Take 1 tablet by mouth Every 8 (Eight) Hours As Needed for Nausea or Vomiting.  What changed: Another medication with the same name was added. Make sure you understand how and when to take each.     * promethazine 25 MG tablet  Commonly known as: PHENERGAN  Take 1 tablet by mouth Every 6 (Six) Hours As Needed for Nausea or Vomiting.  What changed: You were already taking a medication with the same name, and this prescription was added. Make sure you understand how and when to take each.     * promethazine 25 MG suppository  Commonly known as: PHENERGAN  Insert 1 suppository into the rectum Every 6 (Six) Hours As Needed for Nausea or Vomiting.  What changed: You were already taking a medication with the same name, and this prescription was added. Make sure you understand how and when to take each.         * This list has 3  medication(s) that are the same as other medications prescribed for you. Read the directions carefully, and ask your doctor or other care provider to review them with you.               Where to Get Your Medications      These medications were sent to [id number] - [place], [place] - [address] AT [facility] - [phone]  - [phone]   [address] [facility] [address]    Phone: [phone]   · ondansetron ODT 4 MG disintegrating tablet  · promethazine 25 MG suppository  · promethazine 25 MG tablet          Álvaro Layton MD  05/03/23 4507

## 2023-06-03 NOTE — TELEPHONE ENCOUNTER
[facility] Daily Progress Note      Subjective and Objective  -pt is seen and examined this morning    6/2  resp distress overnight, o2 increased 6L, chest x ray showed increased opacity RLL, stable opacity RML, Small left greater than right pleural effusions, new or increased from prior.    ABG ordered  dced iVf  Decreased FWF to 200 ml q 8hr   Lasix x1 dose ordered  Getting neb treatments   Awake, confused  Does not follow commands   Short of breath, using accessory muscles   Contracted extremities       10 point review of systems is negative except otherwise as stated above       Reviewed the labs personally        Vital Last Value 24 Hour Range   Temperature 98.6 °F (37 °C) (06/03/23 0820) Temp  Min: 97.7 °F (36.5 °C)  Max: 99 °F (37.2 °C)   Pulse (!) 106 (06/03/23 0820) Pulse  Min: 98  Max: 112   Respiratory 20 (06/03/23 0820) Resp  Min: 18  Max: 20   Non-Invasive  Blood Pressure (!) 158/76 (06/03/23 0820) BP  Min: 116/64  Max: 158/76   Pulse Oximetry 96 % (06/03/23 0820) SpO2  Min: 90 %  Max: 98 %   Arterial   Blood Pressure   No data recorded      Physical Exam:  General:  Awake confused  In resp distress using accessory muscles  Head:  Normocephalic   Neck:  Trachea is midline. No thyromegaly, no lymphadenopathy.   Eyes:  no scleral icterus,  normal conjunctivae   ENT:   Mucous membranes are moist.    Cardiovascular: normal heart sounds,  Regular rate and rhythm, no murmur.  Respiratory:  ijeoma creps   Gastrointestinal:  Soft and nontender.  Normal bowel sounds.  No hepatosplenomegaly   Genitourinary:no suprapubic tenderness, no flank tenderness   Skin:   rt heel, right 1st MTP and sacral wounds   Musculoskeletal:  Contracted extremities  Extremities: no edema le   Back:  No spine tenderness.  Neurologic: awake, confused, does not follow commands   Psychiatric:   Confused     Labs:  Recent Labs     05/31/23  1227 06/01/23  0433 06/02/23  0440 06/03/23  0432   WBC 14.8* 14.9* 14.2* 13.5*   RBC 3.66* 3.58* 3.52* 3.47*  PATIENT HAS BEEN OUT OF HER L-METHYLFOLATE-ALGAE MEDICATION FOR THREE WEEKS AND WOULD LIKE THIS MEDIATION SENT TO HER [facility] THAT IS ON FILE TODAY. PATIENT CALLED 12/10/2018 TO REQUEST THIS MEDICATION AS WELL. ANY QUESTIONS OR CONCERNS THE PATIENT MAY BE REACHED -[phone]     HGB 10.7* 10.4* 10.2* 10.1*   HCT 34.2* 33.3* 32.2* 31.2*    191 196 187   MCV 93.4 93.0 91.5 89.9   MCH 29.2 29.1 29.0 29.1   MCHC 31.3* 31.2* 31.7* 32.4   NRBCRE 0 0 0 0   ASEG 13.5*  --   --  12.3*   ALYMP 0.6*  --   --  0.1*   AMONO 0.7  --   --  0.9   AEO 0.0  --   --  0.1   ABAS 0.0  --   --  0.0   PMOR  --   --   --  Normal         Recent Labs     06/01/23  1456 06/02/23  0440 06/03/23  0432   SODIUM 150* 146* 146*   POTASSIUM 3.5 3.6 3.2*   CO2 24 22 25   ANIONGAP 12 13 11   GLUCOSE 118* 207* 258*   BUN 68* 59* 50*   CREATININE 1.28* 1.28* 1.14   CALCIUM 8.1* 8.7 8.2*        Recent Labs   Lab 05/31/23  0430   NTPROB 13,862*        No results for input(s): INR, PT, PTT in the last 72 hours.        Inpatient Medications:  Current Facility-Administered Medications   Medication Dose Route Frequency Provider Last Rate Last Admin   • potassium CHLORIDE (KLOR-CON) packet 40 mEq  40 mEq Oral Daily with breakfast Chasidy Galvan MD   40 mEq at 06/03/23 0827   • albuterol (VENTOLIN) nebulizer 2.5 mg  2.5 mg Nebulization Q6H Resp Krystle Guerin CNP   2.5 mg at 06/03/23 0950   • furosemide (LASIX INJECT) injection 40 mg  40 mg Intravenous Once Chasidy Galvan MD       • piperacillin-tazobactam (ZOSYN) 3.375 g in sodium chloride 0.9 % 100 mL IVPB  3.375 g Intravenous 3 times per day Lanre Gaytan MD 25 mL/hr at 06/03/23 0511 3.375 g at 06/03/23 0511   • ALBUTEROL SULFATE (2.5 MG/3ML) 0.083% IN NEBU Pyxis Override            • vancomycin (FIRVANQ) 250 MG/5ML solution 125 mg  125 mg Per PEG Tube 4x Daily Beckie Rosa PA   125 mg at 06/03/23 0832   • amLODIPine (NORVASC) tablet 5 mg  5 mg Per PEG Tube Daily Debbie Butt MD   5 mg at 06/03/23 0828   • sertraline (ZOLOFT) tablet 50 mg  50 mg Per G Tube Daily Debbie Butt MD   50 mg at 06/03/23 0828   • Potassium Standard Replacement Protocol (Levels 3.5 and lower)   Does not apply See Admin Instructions Debbie Butt MD        • Magnesium Standard Replacement Protocol   Does not apply See Admin Instructions Debbie Butt MD       • insulin lispro (ADMELOG,HumaLOG) - Correction Dose   Subcutaneous TID WC Debbie Butt MD   2 Units at 06/03/23 0734   • insulin lispro (ADMELOG,HumaLOG) - Correction Dose   Subcutaneous Nightly Debbie Butt MD       • sodium chloride (PF) 0.9 % injection 2 mL  2 mL Intracatheter 2 times per day Debbie Butt MD   2 mL at 06/03/23 0836   • heparin (porcine) injection 5,000 Units  5,000 Units Subcutaneous 2 times per day Debbie Butt MD   5,000 Units at 06/03/23 0836   • pantoprazole (PROTONIX) 40 MG/20ML (compounded) suspension 40 mg  40 mg Per PEG Tube Daily Debbie Butt MD   40 mg at 06/03/23 0827   • Potassium Replacement (Levels 3.6 - 4)   Does not apply See Admin Instructions Debbie Butt MD       • Magnesium Standard Replacement Protocol   Does not apply See Admin Instructions Debbie Butt MD          Current Facility-Administered Medications   Medication Dose Route Frequency Provider Last Rate Last Admin      Current Facility-Administered Medications   Medication Dose Route Frequency Provider Last Rate Last Admin   • albuterol (VENTOLIN) nebulizer 2.5 mg  2.5 mg Nebulization Q6H Resp PRN Krystle Guerin, CNP       • ALBUTEROL SULFATE (2.5 MG/3ML) 0.083% IN NEBU Pyxis Override            • sodium chloride 0.9 % flush bag 25 mL  25 mL Intravenous PRN Debbie Butt MD       • dextrose 50 % injection 25 g  25 g Intravenous PRN Debbie Butt MD       • dextrose 50 % injection 12.5 g  12.5 g Intravenous PRN Debbie Butt MD       • glucagon (GLUCAGEN) injection 1 mg  1 mg Intramuscular PRN Debbie Butt MD       • dextrose (GLUTOSE) 40 % gel 15 g  15 g Oral PRN Debbie Butt MD       • dextrose (GLUTOSE) 40 % gel 30 g  30 g Oral PRN Debbie Butt MD       • sodium chloride 0.9 % flush bag 25 mL  25 mL Intravenous PRN Debbie Butt MD       • hydrALAZINE  (APRESOLINE) tablet 25 mg  25 mg Oral TID PRN Debbie Butt MD   25 mg at 06/01/23 0430   • albuterol inhaler 2 puff  2 puff Inhalation Q6H Resp PRN Debbie Butt MD       • sodium chloride 0.9 % flush bag 25 mL  25 mL Intravenous PRN Debbie Butt MD            Assessment and Plan:  Per H&P note  -Transferred from nursing home for fever and tachycardia    History Of Present Illness   patient is a 86-year-old male with past medical history of CVA, diabetes, hypertension, A-fib, CKD,  contractures, severe debility, nonverbal at baseline, dysphagia requiring PEG tube, chronic Cesar, history of osteomyelitis was transferred from nursing home for fevers, tachycardia and hypoxia.  In ER patient was saturating in the low 90s with improved saturation with 2 L nasal cannula.  Patient was tachycardic, tachypneic and fever of 39.2 centigrade.  Patient was found to be in sepsis.  Started on broad-spectrum antibiotics.  Hemodynamically remained stable.  Per patient's daughter patient is DNR DNI no cardioversion okay for antiarrhythmics and vasopressors if needed.  Palliative care has been consulted.  Also of note in ER labs significant for elevated sodium-157, potassium 3.2, creatinine of 1.5 and elevated troponins which were uptrending.  Leukocytosis of 14.8  ------------------------------------------------    All the following conditions present on admission    Avery Gay is a 86 year old male patient admitted with Sepsis      Sepsis Likely secondary to pneumonia  Multifocal pneumonia - aspiration vs HCAP   Acute hypoxemic respiratory failure 2ndary to pneumonia POA   Recurrent acute respiratory failure 6/3  C Diff positive- on po Vanco  Chronic Cesar status, no UTI on admission   - CXR 5/31: right upper and lower lobe pneumonia  -Patient was tachycardic, febrile, leukocytosis-SIRS, lactic acid wnl   upon admission  -Urine cx likely contamination   -ID on consult  -IVF dced 6/3   -Started on broad-spectrum  antibiotics including cefepime, vancomycin and azithromycin.  -Monitor fever, WBC count.  -6/3 Resp distress overnight, o2 increased 6L, chest x ray showed increased opacity RLL, stable opacity RML, Small left greater than right pleural effusions, new or increased from prior.  , ABG and chest x ray ordered, dced iVf, Decreased FWF to 200 ml q 8hr , Lasix x1 dose ordered, Getting neb treatments, Pulmonary consulted  -6/3 ABG on 6L 7.47/ pco2 38/po2 51/ hco3 28/ o2 sats 88%, placed on NRBM, continuous pulsox , notified pulmonary         Hypernatremia- improving  -Na trended  down to 146  - on 6/3  -6/3 decreased FWF to 200 ml q 8hr and dced IVF due to resp distress and possible fluid overload   -monitor NA level      Hypokalemia  -On replacement.     BAKARI on CKD stage III  -Likely secondary to decreased p.o. intake/prerenal versus ATN from sepsis  -baseline cr 0.9   GFR 82  -cr 1.37   GFR 45 on admission   -cr trending down 1.14   -dced IVF on 6/3   -Monitor renal lites.     Elevated troponins-likely secondary to demand ischemia type II MI  -In setting of sepsis  Chronically elevated troponin  -Troponins uptrending from 105--122  -Echocardiogram ; ef 61% moderate AR, reduced RV systolic function .  -cardiology consult   -Monitor patient on telemetry.  -EKG no acute ischemic changes noted.    Possible fluid overload and acute on Chronic HFpEF 6/3   -pBNP ordered  -dced ivf  -iv lasix x 1dose        Anemia of chronic disease  Hb stable in 10s      Bedbound/severe contractures/severe debility  Patient is hospice appropriate  -Palliative care consulted  - Selective DNR/DNI- Daughter wants to continue curative measures otherwise     Essential HTN  Hypertensive urgency improved    on hydralazine Prn  - on Amlodipine     Rt Heel stage 4 and right medial 1st MTP joint stage 4 pressure wounds POA   wound care RN  Consulted  Wound care physician on consult   No S/S Of active Infection     Sacral partial thickness  excoriations POA  -wound care RN on consult  Wound care physician on consult   No signs of infection   Cont care per wound care consultants recommendations           DVT prophylaxis: heparin sq  Code status; Select DNR ok for vasopressors and antiarrythmics  PCP: Thom Martins MD  Communication; RN  SDM: Daughter Hortencia   Baseline activity: bedbound, NH resident  Destination; NH             Patient Privacy Notice:  The 21st Century Cures Act makes medical notes like these available to patients in the interest of transparency. Please be advised that this is a medical document. Medical documents are intended to carry relevant information, facts as evident, and the clinical opinion of the practitioner. The medical note is intended as peer to peer communication, and may appear blunt or direct. It is written in medical language, and may contain abbreviations or verbiage that are unfamiliar.    Chasidy Galvan MD  6/3/2023 10:09 AM

## 2023-08-18 DIAGNOSIS — I10 ESSENTIAL HYPERTENSION: ICD-10-CM

## 2023-08-18 RX ORDER — HYDROCHLOROTHIAZIDE 25 MG/1
25 TABLET ORAL DAILY
Qty: 30 TABLET | Refills: 0 | Status: SHIPPED | OUTPATIENT
Start: 2023-08-18

## 2023-08-18 NOTE — TELEPHONE ENCOUNTER
"    Caller: Gino Maguire    Relationship: Self    Best call back number: [phone]     Requested Prescriptions:   Requested Prescriptions     Pending Prescriptions Disp Refills    hydroCHLOROthiazide (HYDRODIURIL) 25 MG tablet 90 tablet 3     Sig: Take 1 tablet by mouth Daily.        [id number] [place] [id number] [facility] [phone] [facility] [phone]      Last office visit with prescribing clinician: 8/8/2022   Last telemedicine visit with prescribing clinician: Visit date not found   Next office visit with prescribing clinician: 9/8/2023     Additional details provided by patient: PATIENT USED TO SEE DR REED    Does the patient have less than a 3 day supply:  [x] Yes  [] No    Would you like a call back once the refill request has been completed: [] Yes [x] No    If the office needs to give you a call back, can they leave a voicemail: [] Yes [x] No    Linh Gilliland Rep   08/18/23 08:37 EDT         DELETE AFTER READING TO PATIENT: "Thank you for sharing this information with me. I will send a message to the clinical team. Please allow 48 hours for the clinical staff to follow up on this request."    "

## 2023-09-08 ENCOUNTER — OFFICE VISIT (OUTPATIENT)
Dept: INTERNAL MEDICINE | Facility: CLINIC | Age: 53
End: 2023-09-08
Payer: COMMERCIAL

## 2023-09-08 ENCOUNTER — LAB (OUTPATIENT)
Dept: LAB | Facility: HOSPITAL | Age: 53
End: 2023-09-08
Payer: COMMERCIAL

## 2023-09-08 VITALS
HEIGHT: 63 IN | TEMPERATURE: 98.1 F | OXYGEN SATURATION: 98 % | HEART RATE: 73 BPM | BODY MASS INDEX: 32.85 KG/M2 | SYSTOLIC BLOOD PRESSURE: 126 MMHG | DIASTOLIC BLOOD PRESSURE: 78 MMHG | WEIGHT: 185.4 LBS

## 2023-09-08 DIAGNOSIS — K76.0 FATTY LIVER DISEASE, NONALCOHOLIC: ICD-10-CM

## 2023-09-08 DIAGNOSIS — Z12.31 ENCOUNTER FOR SCREENING MAMMOGRAM FOR MALIGNANT NEOPLASM OF BREAST: ICD-10-CM

## 2023-09-08 DIAGNOSIS — E78.00 PURE HYPERCHOLESTEROLEMIA: ICD-10-CM

## 2023-09-08 DIAGNOSIS — I10 ESSENTIAL HYPERTENSION: ICD-10-CM

## 2023-09-08 DIAGNOSIS — R79.82 ELEVATED C-REACTIVE PROTEIN (CRP): ICD-10-CM

## 2023-09-08 DIAGNOSIS — Z86.32 HISTORY OF GESTATIONAL DIABETES: ICD-10-CM

## 2023-09-08 DIAGNOSIS — I10 ESSENTIAL HYPERTENSION: Primary | ICD-10-CM

## 2023-09-08 LAB
ALBUMIN SERPL-MCNC: 4.8 G/DL (ref 3.5–5.2)
ALBUMIN/GLOB SERPL: 1.7 G/DL
ALP SERPL-CCNC: 116 U/L (ref 39–117)
ALT SERPL W P-5'-P-CCNC: 16 U/L (ref 1–33)
ANION GAP SERPL CALCULATED.3IONS-SCNC: 11 MMOL/L (ref 5–15)
AST SERPL-CCNC: 19 U/L (ref 1–32)
BILIRUB SERPL-MCNC: 0.5 MG/DL (ref 0–1.2)
BUN SERPL-MCNC: 14 MG/DL (ref 6–20)
BUN/CREAT SERPL: 16.3 (ref 7–25)
CALCIUM SPEC-SCNC: 10.2 MG/DL (ref 8.6–10.5)
CHLORIDE SERPL-SCNC: 102 MMOL/L (ref 98–107)
CHOLEST SERPL-MCNC: 184 MG/DL (ref 0–200)
CO2 SERPL-SCNC: 27 MMOL/L (ref 22–29)
CREAT SERPL-MCNC: 0.86 MG/DL (ref 0.57–1)
CRP SERPL-MCNC: 0.92 MG/DL (ref 0–0.5)
DEPRECATED RDW RBC AUTO: 43 FL (ref 37–54)
EGFRCR SERPLBLD CKD-EPI 2021: 79.9 ML/MIN/1.73
ERYTHROCYTE [DISTWIDTH] IN BLOOD BY AUTOMATED COUNT: 13 % (ref 12.3–15.4)
GLOBULIN UR ELPH-MCNC: 2.8 GM/DL
GLUCOSE SERPL-MCNC: 89 MG/DL (ref 65–99)
HBA1C MFR BLD: 5.6 % (ref 4.8–5.6)
HCT VFR BLD AUTO: 41.2 % (ref 34–46.6)
HDLC SERPL-MCNC: 59 MG/DL (ref 40–60)
HGB BLD-MCNC: 14.1 G/DL (ref 12–15.9)
LDLC SERPL CALC-MCNC: 107 MG/DL (ref 0–100)
LDLC/HDLC SERPL: 1.79 {RATIO}
MCH RBC QN AUTO: 31.3 PG (ref 26.6–33)
MCHC RBC AUTO-ENTMCNC: 34.2 G/DL (ref 31.5–35.7)
MCV RBC AUTO: 91.6 FL (ref 79–97)
PLATELET # BLD AUTO: 310 10*3/MM3 (ref 140–450)
PMV BLD AUTO: 10.5 FL (ref 6–12)
POTASSIUM SERPL-SCNC: 4 MMOL/L (ref 3.5–5.2)
PROT SERPL-MCNC: 7.6 G/DL (ref 6–8.5)
RBC # BLD AUTO: 4.5 10*6/MM3 (ref 3.77–5.28)
SODIUM SERPL-SCNC: 140 MMOL/L (ref 136–145)
TRIGL SERPL-MCNC: 97 MG/DL (ref 0–150)
VLDLC SERPL-MCNC: 18 MG/DL (ref 5–40)
WBC NRBC COR # BLD: 8.57 10*3/MM3 (ref 3.4–10.8)

## 2023-09-08 PROCEDURE — 85027 COMPLETE CBC AUTOMATED: CPT

## 2023-09-08 PROCEDURE — 86140 C-REACTIVE PROTEIN: CPT

## 2023-09-08 PROCEDURE — 99214 OFFICE O/P EST MOD 30 MIN: CPT | Performed by: INTERNAL MEDICINE

## 2023-09-08 PROCEDURE — 80061 LIPID PANEL: CPT

## 2023-09-08 PROCEDURE — 83036 HEMOGLOBIN GLYCOSYLATED A1C: CPT

## 2023-09-08 PROCEDURE — 80053 COMPREHEN METABOLIC PANEL: CPT

## 2023-09-08 NOTE — PROGRESS NOTES
Internal Medicine Follow Up    Chief Complaint  Gino Maguire is a 53 y.o. female who presents today for follow up of chronic medical conditions outlined below.    Chief Complaint   Patient presents with    Hypertension    Liver Pain        HPI  Ms. Maguire comes in today for overdue follow up. She missed physical in February while mom was ill. She reports her mom passed from metastatic lung cancer. She is interested in updating labs today. Notes concern that CRP has been elevated in the past. Was found to RNP ab positive. She was seen by rheumatology who diagnosed with fibromyalgia. She has erythema of both palms. She denies issues with BP. She does make note of chronic RUQ pain with past diagnosis of fatty liver based upon CT. She has been trying to be more active and she takes an NAC supplement. She has gained weight due to stress eating. She previously had followed with Dr. Johnston for her chronic abdominal pain but has been lost to follow up.       Review of Systems  Review of Systems   Constitutional: Negative.    Respiratory: Negative.     Cardiovascular: Negative.    Gastrointestinal:  Positive for abdominal pain (chronic).   Skin:  Positive for color change.   Psychiatric/Behavioral:  Positive for stress. Negative for depressed mood.       Current Medications  Current Outpatient Medications on File Prior to Visit   Medication Sig Dispense Refill    Acetylcysteine (NAC) capsule capsule Take  by mouth.      albuterol sulfate  (90 Base) MCG/ACT inhaler Inhale 2 puffs Every 4 (Four) Hours As Needed for Wheezing or Shortness of Air. 18 g 11    ALPRAZolam (XANAX) 0.25 MG tablet Take 1 tablet by mouth At Night As Needed for Anxiety or Sleep. One po bid prn 30 tablet 0    aspirin 81 MG EC tablet Take 1 tablet by mouth Daily.      buPROPion XL (WELLBUTRIN XL) 150 MG 24 hr tablet Take 1 tablet by mouth Daily.      cetirizine (zyrTEC) 10 MG tablet Take 1 tablet by mouth Daily.      fluticasone (FLONASE) 50  MCG/ACT nasal spray 2 sprays into the nostril(s) as directed by provider.      hydroCHLOROthiazide (HYDRODIURIL) 25 MG tablet Take 1 tablet by mouth Daily. 30 tablet 0    L-methylfolate Calcium 15 MG tablet Take 1 tablet by mouth Daily.      MAGNESIUM PO Take  by mouth.      meloxicam (MOBIC) 15 MG tablet 1 PO Daily with food. 90 tablet 1    Omega 3-6-9 Fatty Acids (OMEGA 3-6-9 COMPLEX PO) Take  by mouth.      ondansetron ODT (ZOFRAN-ODT) 4 MG disintegrating tablet Place 1 tablet on the tongue Every 6 (Six) Hours As Needed for Nausea or Vomiting. 15 tablet 0    promethazine (PHENERGAN) 25 MG tablet Take 1 tablet by mouth Every 8 (Eight) Hours As Needed for Nausea or Vomiting. 21 tablet 2    propranolol (INDERAL) 10 MG tablet TAKE 1 TABLET BY MOUTH TWICE DAILY 60 tablet 5    vitamin C (ASCORBIC ACID) 250 MG tablet Take 1 tablet by mouth Daily.      vitamin D (ERGOCALCIFEROL) 1.25 MG (46110 UT) capsule capsule Take 1 capsule by mouth Every 7 (Seven) Days.      POTASSIUM PO Take  by mouth.      [DISCONTINUED] albuterol sulfate  (90 Base) MCG/ACT inhaler Inhale 2 puffs Every 4 (Four) Hours As Needed for Wheezing or Shortness of Air. 18 g 0    [DISCONTINUED] benzonatate (TESSALON) 100 MG capsule Take 1 capsule by mouth 3 (Three) Times a Day As Needed for Cough. 21 capsule 0    [DISCONTINUED] promethazine (PHENERGAN) 25 MG suppository Insert 1 suppository into the rectum Every 6 (Six) Hours As Needed for Nausea or Vomiting. 10 suppository 0    [DISCONTINUED] promethazine (PHENERGAN) 25 MG tablet Take 1 tablet by mouth Every 6 (Six) Hours As Needed for Nausea or Vomiting. 15 tablet 0     No current facility-administered medications on file prior to visit.       Allergies  Allergies   Allergen Reactions    Maxalt [Rizatriptan] Anaphylaxis    Sulfa Antibiotics Rash    Sumatriptan Anaphylaxis    Zolmitriptan Anaphylaxis    Labetalol Rash       Objective  Visit Vitals  /78   Pulse 73   Temp 98.1 °F (36.7 °C)   Ht  "160 cm (63\")   Wt 84.1 kg (185 lb 6.4 oz)   SpO2 98% Comment: ra   BMI 32.84 kg/m²        Physical Exam  Physical Exam  Vitals and nursing note reviewed.   Constitutional:       General: She is not in acute distress.     Appearance: She is well-developed. She is obese. She is not ill-appearing or toxic-appearing.   HENT:      Head: Normocephalic and atraumatic.   Eyes:      General: No scleral icterus.     Conjunctiva/sclera: Conjunctivae normal.   Cardiovascular:      Rate and Rhythm: Normal rate and regular rhythm.      Heart sounds: Normal heart sounds. No murmur heard.  Pulmonary:      Effort: Pulmonary effort is normal. No respiratory distress.      Breath sounds: Normal breath sounds.   Abdominal:      General: There is no distension.      Palpations: Abdomen is soft. There is no mass.      Tenderness: There is abdominal tenderness (diffuse soreness, slightly worse in RUQ). There is no guarding or rebound.      Comments: No hepatomegaly   Skin:     General: Skin is warm and dry.      Coloration: Skin is not jaundiced.      Findings: Erythema (palmar) present.   Neurological:      Mental Status: She is alert and oriented to person, place, and time.      Gait: Gait normal.   Psychiatric:         Mood and Affect: Mood normal.         Behavior: Behavior normal.       Results  Results for orders placed or performed during the hospital encounter of 05/03/23   Comprehensive Metabolic Panel    Specimen: Blood   Result Value Ref Range    Glucose 121 (H) 65 - 99 mg/dL    BUN 17 6 - 20 mg/dL    Creatinine 0.95 0.57 - 1.00 mg/dL    Sodium 140 136 - 145 mmol/L    Potassium 4.4 3.5 - 5.2 mmol/L    Chloride 103 98 - 107 mmol/L    CO2 24.0 22.0 - 29.0 mmol/L    Calcium 10.2 8.6 - 10.5 mg/dL    Total Protein 8.4 6.0 - 8.5 g/dL    Albumin 5.0 3.5 - 5.2 g/dL    ALT (SGPT) 20 1 - 33 U/L    AST (SGOT) 20 1 - 32 U/L    Alkaline Phosphatase 125 (H) 39 - 117 U/L    Total Bilirubin 0.7 0.0 - 1.2 mg/dL    Globulin 3.4 gm/dL    A/G " Ratio 1.5 g/dL    BUN/Creatinine Ratio 17.9 7.0 - 25.0    Anion Gap 13.0 5.0 - 15.0 mmol/L    eGFR 70.9 >60.0 mL/min/1.73   Lipase    Specimen: Blood   Result Value Ref Range    Lipase 16 13 - 60 U/L   Urinalysis With Microscopic If Indicated (No Culture) - Urine, Clean Catch    Specimen: Urine, Clean Catch   Result Value Ref Range    Color, UA Yellow Yellow, Straw    Appearance, UA Clear Clear    pH, UA 6.5 5.0 - 8.0    Specific Gravity, UA 1.008 1.001 - 1.030    Glucose, UA Negative Negative    Ketones, UA Negative Negative    Bilirubin, UA Negative Negative    Blood, UA Negative Negative    Protein, UA Negative Negative    Leuk Esterase, UA Negative Negative    Nitrite, UA Negative Negative    Urobilinogen, UA 0.2 E.U./dL 0.2 - 1.0 E.U./dL   Lactic Acid, Plasma    Specimen: Blood   Result Value Ref Range    Lactate 1.9 0.5 - 2.0 mmol/L   CBC Auto Differential    Specimen: Blood   Result Value Ref Range    WBC 12.12 (H) 3.40 - 10.80 10*3/mm3    RBC 5.03 3.77 - 5.28 10*6/mm3    Hemoglobin 15.6 12.0 - 15.9 g/dL    Hematocrit 47.3 (H) 34.0 - 46.6 %    MCV 94.0 79.0 - 97.0 fL    MCH 31.0 26.6 - 33.0 pg    MCHC 33.0 31.5 - 35.7 g/dL    RDW 13.2 12.3 - 15.4 %    RDW-SD 45.8 37.0 - 54.0 fl    MPV 9.7 6.0 - 12.0 fL    Platelets 303 140 - 450 10*3/mm3    Neutrophil % 92.6 (H) 42.7 - 76.0 %    Lymphocyte % 3.5 (L) 19.6 - 45.3 %    Monocyte % 2.9 (L) 5.0 - 12.0 %    Eosinophil % 0.5 0.3 - 6.2 %    Basophil % 0.2 0.0 - 1.5 %    Immature Grans % 0.3 0.0 - 0.5 %    Neutrophils, Absolute 11.22 (H) 1.70 - 7.00 10*3/mm3    Lymphocytes, Absolute 0.43 (L) 0.70 - 3.10 10*3/mm3    Monocytes, Absolute 0.35 0.10 - 0.90 10*3/mm3    Eosinophils, Absolute 0.06 0.00 - 0.40 10*3/mm3    Basophils, Absolute 0.02 0.00 - 0.20 10*3/mm3    Immature Grans, Absolute 0.04 0.00 - 0.05 10*3/mm3    nRBC 0.0 0.0 - 0.2 /100 WBC   Green Top (Gel)   Result Value Ref Range    Extra Tube Hold for add-ons.    Lavender Top   Result Value Ref Range    Extra Tube  hold for add-on    Gold Top - SST   Result Value Ref Range    Extra Tube Hold for add-ons.    Gray Top   Result Value Ref Range    Extra Tube Hold for add-ons.    Light Blue Top   Result Value Ref Range    Extra Tube Hold for add-ons.         Assessment and Plan  Diagnoses and all orders for this visit:    Essential hypertension  - BP controlled  - continue HCTZ 25mg daily  - CMP today    Elevated C-reactive protein (CRP)  - in 2019 had elevated CRP with positive RNP in setting of negative ISSA  - referred to ACL who did confirmatory testing but suspected fibromyalgia. Unsure outcome of testing.  - will obtain CRP and if still elevated repeat testing for autoimmune conditions    Fatty liver disease, nonalcoholic  - diagnosed based upon steatosis on CT scan done in ED  - has palmar erythema chronically but otherwise has had no signs of liver dysfunction  - CMP and CBC today, if LFTs elevated would pursue further workup    History of gestational diabetes  - A1c ordered    Pure hypercholesterolemia  - lipid panel ordered    Encounter for screening mammogram for malignant neoplasm of breast  -     Mammo Screening Digital Tomosynthesis Bilateral With CAD; Future     Health Maintenance  - Pap smear: scheduled, GYN [facility]  - Mammogram: ordered  - Colonoscopy: 12/2017, repeat in 2027  - HCV: negative  - Immunizations: COVID19, Tdap, shingrix declined  - Depression screening: negative 9/2023    Return in about 3 months (around 12/8/2023) for Annual (any open 30 minutes), Labs today.    Answers submitted by the patient for this visit:  Primary Reason for Visit (Submitted on 9/1/2023)  What is the primary reason for your visit?: High Blood Pressure

## 2023-09-14 DIAGNOSIS — I10 ESSENTIAL HYPERTENSION: ICD-10-CM

## 2023-09-14 RX ORDER — HYDROCHLOROTHIAZIDE 25 MG/1
25 TABLET ORAL DAILY
Qty: 30 TABLET | Refills: 0 | Status: SHIPPED | OUTPATIENT
Start: 2023-09-14

## 2023-09-30 ENCOUNTER — OFFICE VISIT (OUTPATIENT)
Dept: INTERNAL MEDICINE | Facility: CLINIC | Age: 53
End: 2023-09-30
Payer: COMMERCIAL

## 2023-09-30 VITALS
OXYGEN SATURATION: 97 % | DIASTOLIC BLOOD PRESSURE: 82 MMHG | BODY MASS INDEX: 32.78 KG/M2 | TEMPERATURE: 98.1 F | SYSTOLIC BLOOD PRESSURE: 124 MMHG | HEART RATE: 65 BPM | HEIGHT: 63 IN | WEIGHT: 185 LBS

## 2023-09-30 DIAGNOSIS — R05.1 ACUTE COUGH: ICD-10-CM

## 2023-09-30 DIAGNOSIS — U07.1 COVID: Primary | ICD-10-CM

## 2023-09-30 PROCEDURE — 99212 OFFICE O/P EST SF 10 MIN: CPT | Performed by: NURSE PRACTITIONER

## 2023-09-30 RX ORDER — BENZONATATE 200 MG/1
200 CAPSULE ORAL 3 TIMES DAILY PRN
Qty: 30 CAPSULE | Refills: 0 | Status: SHIPPED | OUTPATIENT
Start: 2023-09-30 | End: 2023-10-10

## 2023-09-30 NOTE — PROGRESS NOTES
"Chief Complaint  Sore Throat (Tested positive for covid yesterday )    Subjective        Gino Maguire presents to [facility]  Sore Throat   Associated symptoms include coughing, diarrhea and ear pain (left ear pain). Pertinent negatives include no headaches or shortness of breath.   The patient is a 53 female who presents to the clinic with a positive home COVID test yesterday. Upper respiratory symptoms developed four days ago. Symptoms include fatigue, fever, left ear pain (improving), PND, runny nose, sinus pressure, and sore throat. The patient is concerned she has a left tonsil stone contributing to her sore throat. She is taking Ibuprofen and Tylenol for fever. Denies history of COPD/asthma. She is not short of breath. Patient has not had a COVID vaccination.     Review of Systems   Constitutional:  Positive for fatigue and fever.   HENT:  Positive for ear pain (left ear pain), postnasal drip, rhinorrhea, sinus pressure and sore throat.    Respiratory:  Positive for cough. Negative for shortness of breath and wheezing.    Gastrointestinal:  Positive for diarrhea.   Musculoskeletal:  Positive for myalgias.   Neurological:  Positive for dizziness. Negative for headaches.    Objective   Vital Signs:  /82   Pulse 65   Temp 98.1 °F (36.7 °C)   Ht 160 cm (62.99\")   Wt 83.9 kg (185 lb)   SpO2 97%   BMI 32.78 kg/m²   Estimated body mass index is 32.78 kg/m² as calculated from the following:    Height as of this encounter: 160 cm (62.99\").    Weight as of this encounter: 83.9 kg (185 lb).       Physical Exam  Vitals and nursing note reviewed.   Constitutional:       Appearance: Normal appearance.   HENT:      Head: Normocephalic and atraumatic.      Right Ear: Tympanic membrane, ear canal and external ear normal.      Left Ear: Tympanic membrane, ear canal and external ear normal.      Nose: Nose normal.      Mouth/Throat:      Mouth: Mucous membranes are moist.    "   Pharynx: Posterior oropharyngeal erythema present. No oropharyngeal exudate.      Comments: No tonsil stones visualized.  Eyes:      Conjunctiva/sclera: Conjunctivae normal.      Pupils: Pupils are equal, round, and reactive to light.   Cardiovascular:      Rate and Rhythm: Normal rate and regular rhythm.      Heart sounds: Normal heart sounds.   Pulmonary:      Effort: Pulmonary effort is normal.      Breath sounds: Normal breath sounds.   Musculoskeletal:         General: Normal range of motion.      Cervical back: Normal range of motion and neck supple.   Lymphadenopathy:      Cervical: No cervical adenopathy.   Skin:     General: Skin is warm.   Neurological:      General: No focal deficit present.      Mental Status: She is alert.   Psychiatric:         Mood and Affect: Mood normal.         Behavior: Behavior normal.         Thought Content: Thought content normal.         Judgment: Judgment normal.      Result Review :             Assessment and Plan   Diagnoses and all orders for this visit:    1. COVID (Primary)    2. Acute cough  -     benzonatate (TESSALON) 200 MG capsule; Take 1 capsule by mouth 3 (Three) Times a Day As Needed for Cough for up to 10 days.  Dispense: 30 capsule; Refill: 0      Reviewed exam findings with the patient. Discussed treatment options for COVID including Molnupiravir and Paxlovid. Patient declines both medications. Tylenol/Ibuprofen prn fever. Recommend she drink plenty of fluids, balanced diet, rich in vitamin C and D. Go to the emergency room for worsening of symptoms/shortness of breath.        Follow Up   No follow-ups on file.  Patient was given instructions and counseling regarding her condition or for health maintenance advice. Please see specific information pulled into the AVS if appropriate.

## 2023-10-29 DIAGNOSIS — I10 ESSENTIAL HYPERTENSION: ICD-10-CM

## 2023-10-30 RX ORDER — HYDROCHLOROTHIAZIDE 25 MG/1
25 TABLET ORAL DAILY
Qty: 30 TABLET | Refills: 1 | Status: SHIPPED | OUTPATIENT
Start: 2023-10-30

## 2023-11-02 ENCOUNTER — OFFICE VISIT (OUTPATIENT)
Dept: ORTHOPEDIC SURGERY | Facility: CLINIC | Age: 53
End: 2023-11-02
Payer: COMMERCIAL

## 2023-11-02 VITALS
SYSTOLIC BLOOD PRESSURE: 132 MMHG | HEIGHT: 64 IN | WEIGHT: 179.2 LBS | BODY MASS INDEX: 30.59 KG/M2 | DIASTOLIC BLOOD PRESSURE: 84 MMHG

## 2023-11-02 DIAGNOSIS — M17.11 PRIMARY OSTEOARTHRITIS OF RIGHT KNEE: ICD-10-CM

## 2023-11-02 DIAGNOSIS — M17.12 PRIMARY OSTEOARTHRITIS OF LEFT KNEE: ICD-10-CM

## 2023-11-02 DIAGNOSIS — M25.562 LATERAL KNEE PAIN, LEFT: Primary | ICD-10-CM

## 2023-11-02 PROCEDURE — 99213 OFFICE O/P EST LOW 20 MIN: CPT | Performed by: ORTHOPAEDIC SURGERY

## 2023-11-02 NOTE — PROGRESS NOTES
Orthopaedic Clinic Note: Knee Established Patient    Chief Complaint   Patient presents with    Follow-up     6 month follow up -- Primary osteoarthritis of right knee, Primary osteoarthritis of left knee         HPI    It has been 6  month(s) since Ms. Maguire's last visit. She returns to clinic today for follow-up bilateral knee osteoarthritis.  She completed viscosupplementation injections approximately 6 months ago.  The injections worked well for the right knee and this continue to provide good pain relief in the right knee.  The left knee however she is experiencing sharp mechanical pain localized to the lateral joint line.  It radiates towards the anterior aspect of the knee.  She has had multiple episodes of a catching sensation and intermittent locking that has occurred more frequently over the past 2 months.  Rates the pain a 2/10 on the pain scale but when it occurs, the catching results in a 8/10 pain.  She is concerned about this pain as it is limiting daily activities.    Past Medical History:   Diagnosis Date    Abdominal pain     Abdominal pain, RUQ     Check cmp. refer to [facility] u/s and gen surg eval d/t cholelithiasis noted on CT of abd/pelvis donein ER 1/16    Acute bronchitis     Take cefdinir and medrol dose pack as directed. Use otc mucinex. Continue to rest and push fluids. Call or rtc if no better. Gave phenergan w/ codeine cough syrup 5 ml po prn #120ml, no RF per Dr. Cesar    Acute pharyngitis     Check for monod/t exposure/ Pt will restart flovent as used for esophagitis. If no improvement take the medrol dose pack she was given at least visit    Acute sinusitis     Acute upper respiratory infection     Allergic     Anxiety     Body aches     Bursitis of hip 2015/2016?    Cervical disc disorder 2021    Cholelithiasis     Reviewed CT of adb/pelvis from ER 1/16 showed cholelithiasis. Will refer for gen surgery eval and [facility] u/s    Chronic pain disorder     Cough     Depression     Eosinophilic  esophagitis     Extremity pain     Fibromyalgia, primary     Gestational diabetes     Headache     Hypertension     Influenza     Knee swelling     Labyrinthitis     Low back pain     Lumbar strain     Lumbosacral disc disease     Neck pain     Right hip pain     Rotator cuff syndrome     Shingles     Strain of thoracic region     Viral infection     Viral pharyngitis     Visual impairment       Past Surgical History:   Procedure Laterality Date    BREAST BIOPSY Left 10/2018    Benign STEREO      SECTION      97, 00, 04, 12    CHOLECYSTECTOMY  2017    COLONOSCOPY      DILATATION AND CURETTAGE      ENDOSCOPY      OOPHORECTOMY Right 2017    due to ovarian cyst    ROTATOR CUFF REPAIR Left 2022    SHOULDER SURGERY  2022    SUBTOTAL HYSTERECTOMY      WISDOM TOOTH EXTRACTION        Family History   Problem Relation Age of Onset    Arthritis Mother     Hypertension Mother     Thyroid disease Mother     Lung cancer Mother         adenocarcinoma, nonsmoking    Arthritis Father     Hypertension Father     Heart disease Father     Stroke Father     Skin cancer Father         nonmelanoma    Arthritis Sister     Kidney disease Sister     Liver disease Sister     Breast cancer Sister 62    Arthritis Brother     Allergic rhinitis Brother     Lung disease Brother         occupational, black lung    Valvular heart disease Maternal Grandmother     Arrhythmia Maternal Grandmother         had pacemaker    Diabetes Maternal Grandfather     Heart disease Maternal Grandfather         numerous heart attacks    Heart failure Paternal Grandmother     Heart disease Paternal Grandmother     Throat cancer Paternal Grandfather     Ovarian cancer Cousin 42    Breast cancer Niece 31    Heart attack Other     Arthritis Other     Hypertension Other     Migraines Other     Stroke Other     Endometrial cancer Neg Hx     Colon cancer Neg Hx      Social History     Socioeconomic History    Marital  status:    Tobacco Use    Smoking status: Former     Packs/day: 1.00     Years: 3.00     Additional pack years: 0.00     Total pack years: 3.00     Types: Cigarettes     Start date: 2006     Quit date: 2009     Years since quittin.2    Smokeless tobacco: Never   Vaping Use    Vaping Use: Never used   Substance and Sexual Activity    Alcohol use: Yes     Comment: Only occasionally every few months?    Drug use: No    Sexual activity: Not Currently     Partners: Male     Birth control/protection: Post-menopausal      Current Outpatient Medications on File Prior to Visit   Medication Sig Dispense Refill    Acetylcysteine (NAC) capsule capsule Take  by mouth.      albuterol sulfate  (90 Base) MCG/ACT inhaler Inhale 2 puffs Every 4 (Four) Hours As Needed for Wheezing or Shortness of Air. 18 g 11    ALPRAZolam (XANAX) 0.25 MG tablet Take 1 tablet by mouth At Night As Needed for Anxiety or Sleep. One po bid prn 30 tablet 0    aspirin 81 MG EC tablet Take 1 tablet by mouth Daily.      buPROPion XL (WELLBUTRIN XL) 150 MG 24 hr tablet Take 1 tablet by mouth Daily.      cetirizine (zyrTEC) 10 MG tablet Take 1 tablet by mouth Daily.      fluticasone (FLONASE) 50 MCG/ACT nasal spray 2 sprays into the nostril(s) as directed by provider.      hydroCHLOROthiazide (HYDRODIURIL) 25 MG tablet TAKE 1 TABLET BY MOUTH DAILY 30 tablet 1    L-methylfolate Calcium 15 MG tablet Take 1 tablet by mouth Daily.      MAGNESIUM PO Take  by mouth.      meloxicam (MOBIC) 15 MG tablet 1 PO Daily with food. 90 tablet 1    Omega 3-6-9 Fatty Acids (OMEGA 3-6-9 COMPLEX PO) Take  by mouth.      ondansetron ODT (ZOFRAN-ODT) 4 MG disintegrating tablet Place 1 tablet on the tongue Every 6 (Six) Hours As Needed for Nausea or Vomiting. 15 tablet 0    POTASSIUM PO Take  by mouth.      promethazine (PHENERGAN) 25 MG tablet Take 1 tablet by mouth Every 8 (Eight) Hours As Needed for Nausea or Vomiting. 21 tablet 2    propranolol  "(INDERAL) 10 MG tablet TAKE 1 TABLET BY MOUTH TWICE DAILY 60 tablet 5    vitamin C (ASCORBIC ACID) 250 MG tablet Take 1 tablet by mouth Daily.      vitamin D (ERGOCALCIFEROL) 1.25 MG (31163 UT) capsule capsule Take 1 capsule by mouth Every 7 (Seven) Days.       No current facility-administered medications on file prior to visit.      Allergies   Allergen Reactions    Maxalt [Rizatriptan] Anaphylaxis    Sulfa Antibiotics Rash    Sumatriptan Anaphylaxis    Zolmitriptan Anaphylaxis    Labetalol Rash        Review of Systems   Constitutional: Negative.    HENT: Negative.     Eyes: Negative.    Respiratory: Negative.     Cardiovascular: Negative.    Gastrointestinal: Negative.    Endocrine: Negative.    Genitourinary: Negative.    Musculoskeletal:  Positive for arthralgias.   Skin: Negative.    Allergic/Immunologic: Negative.    Neurological: Negative.    Hematological: Negative.    Psychiatric/Behavioral: Negative.          The patient's Review of Systems was personally reviewed and confirmed as accurate.    Physical Exam  Blood pressure 132/84, height 162.6 cm (64\"), weight 81.3 kg (179 lb 3.2 oz), not currently breastfeeding.    Body mass index is 30.76 kg/m².    GENERAL APPEARANCE: awake, alert, oriented, in no acute distress and well developed, well nourished  LUNGS:  breathing nonlabored  EXTREMITIES: no clubbing, cyanosis  PERIPHERAL PULSES: palpable dorsalis pedis and posterior tibial pulses bilaterally.    GAIT:  Antalgic        ----------  Bilateral Knee Exam:  ----------  ALIGNMENT: neutral  ----------  RANGE OF MOTION:  Normal (0-120 degrees) with no extensor lag or flexion contracture  LIGAMENTOUS STABILITY:   stable to varus and valgus stress at terminal extension and 30 degrees without any evidence of laxity  ----------  STRENGTH:  KNEE FLEXION 5/5  KNEE EXTENSION  5/5  ANKLE DORSIFLEXION  5/5  ANKLE PLANTARFLEXION  5/5  ----------  PAIN WITH PALPATION: Lateral joint line of left knee only  KNEE EFFUSION: " yes, trace effusion  PAIN WITH KNEE ROM: yes  PATELLAR CREPITUS:  yes, painful and symptomatic  Painful lateral Fidencio's on the left side.  Negative medial Fidencio's  ----------  SENSATION TO LIGHT TOUCH:  DEEP PERONEAL/SUPERFICIAL PERONEAL/SURAL/SAPHENOUS/TIBIAL:    intact  ----------  EDEMA:  no  ERYTHEMA:    no  WOUNDS/INCISIONS:   no    _____________________________________________________________________  _____________________________________________________________________    RADIOGRAPHIC FINDINGS:   No new imaging today    Assessment/Plan:   Diagnosis Plan   1. Lateral knee pain, left  MRI Knee Left Without Contrast      2. Primary osteoarthritis of left knee        3. Primary osteoarthritis of right knee          Patient is experiencing ongoing mechanical symptoms concerning for lateral meniscus tear of the left knee.  I recommend obtaining an MRI of the left knee to assess for meniscal pathology as her symptoms have been refractory to injections as well as prescription anti-inflammatory.  I will see her back after the MRI to discuss results and how to proceed.      Shay Rock MD  11/02/23  09:17 EDT

## 2023-12-22 DIAGNOSIS — M17.12 PRIMARY OSTEOARTHRITIS OF LEFT KNEE: ICD-10-CM

## 2023-12-22 DIAGNOSIS — M17.11 PRIMARY OSTEOARTHRITIS OF RIGHT KNEE: ICD-10-CM

## 2023-12-22 RX ORDER — MELOXICAM 15 MG/1
TABLET ORAL
Qty: 90 TABLET | Refills: 1 | Status: SHIPPED | OUTPATIENT
Start: 2023-12-22

## 2023-12-26 ENCOUNTER — TELEPHONE (OUTPATIENT)
Dept: OBSTETRICS AND GYNECOLOGY | Facility: CLINIC | Age: 53
End: 2023-12-26

## 2023-12-26 ENCOUNTER — OFFICE VISIT (OUTPATIENT)
Dept: OBSTETRICS AND GYNECOLOGY | Facility: CLINIC | Age: 53
End: 2023-12-26
Payer: COMMERCIAL

## 2023-12-26 ENCOUNTER — PATIENT ROUNDING (BHMG ONLY) (OUTPATIENT)
Dept: OBSTETRICS AND GYNECOLOGY | Facility: CLINIC | Age: 53
End: 2023-12-26
Payer: COMMERCIAL

## 2023-12-26 VITALS
BODY MASS INDEX: 31.41 KG/M2 | DIASTOLIC BLOOD PRESSURE: 90 MMHG | HEIGHT: 64 IN | WEIGHT: 184 LBS | SYSTOLIC BLOOD PRESSURE: 140 MMHG

## 2023-12-26 DIAGNOSIS — Z01.419 WOMEN'S ANNUAL ROUTINE GYNECOLOGICAL EXAMINATION: Primary | ICD-10-CM

## 2023-12-26 DIAGNOSIS — Z12.31 BREAST CANCER SCREENING BY MAMMOGRAM: ICD-10-CM

## 2023-12-26 DIAGNOSIS — N95.1 MENOPAUSAL SYMPTOMS: ICD-10-CM

## 2023-12-26 DIAGNOSIS — N95.1 MENOPAUSAL STATE: ICD-10-CM

## 2023-12-26 RX ORDER — ESTRADIOL 0.1 MG/G
CREAM VAGINAL
Qty: 1 EACH | Refills: 2 | Status: SHIPPED | OUTPATIENT
Start: 2023-12-26

## 2023-12-26 RX ORDER — ESTRADIOL 0.05 MG/D
1 PATCH TRANSDERMAL WEEKLY
Qty: 4 PATCH | Refills: 12 | Status: SHIPPED | OUTPATIENT
Start: 2023-12-26

## 2023-12-26 RX ORDER — PROGESTERONE 100 MG/1
100 CAPSULE ORAL
Qty: 30 CAPSULE | Refills: 12 | Status: SHIPPED | OUTPATIENT
Start: 2023-12-26

## 2023-12-26 NOTE — PROGRESS NOTES
Gynecologic Annual Exam Note        GYN Annual Exam     CC - Here for annual exam.        HPI  Gino Maguire is a 53 y.o. female, , who presents for annual well woman exam as a previous patient not seen in the last three years.  She had an ablation. Denies vaginal bleeding.  Patient reports problems with: hot flashes, night sweats, vaginal dryness, and vaginal pain with IC . There were no changes to her medical or surgical history since her last visit.. Partner Status: Marital Status: .  She is is sexually active. She has not had new partners.. STD testing recommendations have been explained to the patient and she does not desire STD testing.    Additional OB/GYN History   On HRT? No    Last Pap : 2018. Results: negative.     History of abnormal Pap smear: no  Family history of uterine, colon, breast, or ovarian cancer: yes - sister- breast cancer, pat cousin- ovarian cancer  Performs monthly Self-Breast Exam: yes  Last mammogram: 2021. Done at .      Last colonoscopy: has had a colonoscopy 6 year(s) ago.    Last Completed Colonoscopy            COLORECTAL CANCER SCREENING (COLONOSCOPY - Every 10 Years) Next due on 12/15/2027      12/15/2017  COLONOSCOPY (Done)                      Last bone density scan (DEXA): None  Exercises Regularly: yes  Feelings of Anxiety or Depression: yes - managed      Tobacco Usage?: No       Current Outpatient Medications:     Acetylcysteine (NAC) capsule capsule, Take  by mouth., Disp: , Rfl:     albuterol sulfate  (90 Base) MCG/ACT inhaler, Inhale 2 puffs Every 4 (Four) Hours As Needed for Wheezing or Shortness of Air., Disp: 18 g, Rfl: 11    ALPRAZolam (XANAX) 0.25 MG tablet, Take 1 tablet by mouth At Night As Needed for Anxiety or Sleep. One po bid prn, Disp: 30 tablet, Rfl: 0    aspirin 81 MG EC tablet, Take 1 tablet by mouth Daily., Disp: , Rfl:     buPROPion XL (WELLBUTRIN XL) 150 MG 24 hr tablet, Take 1 tablet by mouth Daily., Disp:  , Rfl:     cetirizine (zyrTEC) 10 MG tablet, Take 1 tablet by mouth Daily., Disp: , Rfl:     fluticasone (FLONASE) 50 MCG/ACT nasal spray, 2 sprays into the nostril(s) as directed by provider., Disp: , Rfl:     hydroCHLOROthiazide (HYDRODIURIL) 25 MG tablet, TAKE 1 TABLET BY MOUTH DAILY, Disp: 30 tablet, Rfl: 1    L-methylfolate Calcium 15 MG tablet, Take 1 tablet by mouth Daily., Disp: , Rfl:     MAGNESIUM PO, Take  by mouth., Disp: , Rfl:     meloxicam (MOBIC) 15 MG tablet, TAKE 1 TABLET BY MOUTH DAILY WITH FOOD, Disp: 90 tablet, Rfl: 1    Omega 3-6-9 Fatty Acids (OMEGA 3-6-9 COMPLEX PO), Take  by mouth., Disp: , Rfl:     ondansetron ODT (ZOFRAN-ODT) 4 MG disintegrating tablet, Place 1 tablet on the tongue Every 6 (Six) Hours As Needed for Nausea or Vomiting., Disp: 15 tablet, Rfl: 0    POTASSIUM PO, Take  by mouth., Disp: , Rfl:     promethazine (PHENERGAN) 25 MG tablet, Take 1 tablet by mouth Every 8 (Eight) Hours As Needed for Nausea or Vomiting., Disp: 21 tablet, Rfl: 2    propranolol (INDERAL) 10 MG tablet, TAKE 1 TABLET BY MOUTH TWICE DAILY, Disp: 60 tablet, Rfl: 5    vitamin C (ASCORBIC ACID) 250 MG tablet, Take 1 tablet by mouth Daily., Disp: , Rfl:     vitamin D (ERGOCALCIFEROL) 1.25 MG (66132 UT) capsule capsule, Take 1 capsule by mouth Every 7 (Seven) Days., Disp: , Rfl:     Patient denies the need for medication refills today.    OB History          4    Para   4    Term   4            AB        Living             SAB        IAB        Ectopic        Molar        Multiple        Live Births                    Past Medical History:   Diagnosis Date    Abdominal pain     Abdominal pain, RUQ     Check cmp. refer to GB u/s and gen surg eval d/t cholelithiasis noted on CT of abd/pelvis donein ER     Acute bronchitis     Take cefdinir and medrol dose pack as directed. Use otc mucinex. Continue to rest and push fluids. Call or rtc if no better. Gave phenergan w/ codeine cough syrup 5 ml po  prn #120ml, no RF per Dr. Cesar    Acute pharyngitis     Check for monod/t exposure/ Pt will restart flovent as used for esophagitis. If no improvement take the medrol dose pack she was given at least visit    Acute sinusitis     Acute upper respiratory infection     Allergic     Anxiety     Body aches     Bursitis of hip ?    Cervical disc disorder     Cholelithiasis     Reviewed CT of adb/pelvis from ER  showed cholelithiasis. Will refer for gen surgery eval and GB u/s    Chronic pain disorder     Cough     Depression     Eosinophilic esophagitis     Extremity pain     Fibromyalgia, primary     Gestational diabetes     Headache     Hypertension     Influenza     Knee swelling     Labyrinthitis     Low back pain     Lumbar strain     Lumbosacral disc disease     Neck pain     Right hip pain     Rotator cuff syndrome     Shingles     Strain of thoracic region     Viral infection     Viral pharyngitis     Visual impairment         Past Surgical History:   Procedure Laterality Date    BREAST BIOPSY Left 10/2018    Benign STEREO      SECTION      97, 00, 04, 12    CHOLECYSTECTOMY  2017    COLONOSCOPY      DILATATION AND CURETTAGE      ENDOSCOPY      OOPHORECTOMY Right 2017    due to ovarian cyst    ROTATOR CUFF REPAIR Left 2022    SHOULDER SURGERY  2022    SUBTOTAL HYSTERECTOMY      WISDOM TOOTH EXTRACTION         Health Maintenance   Topic Date Due    Hepatitis B (1 of 3 - 3-dose series) Never done    Annual Gynecologic Pelvic and Breast Exam  Never done    COVID-19 Vaccine (1) Never done    Pneumococcal Vaccine 0-64 (1 - PCV) Never done    ZOSTER VACCINE (1 of 2) Never done    PAP SMEAR  2021    ANNUAL PHYSICAL  2022    MAMMOGRAM  2022    TDAP/TD VACCINES (2 - Td or Tdap) 2022    BMI FOLLOWUP  2023    INFLUENZA VACCINE  2023    LIPID PANEL  2024    COLORECTAL CANCER SCREENING  12/15/2027    HEPATITIS C  "SCREENING  Completed       The additional following portions of the patient's history were reviewed and updated as appropriate: allergies, current medications, past family history, past medical history, past social history, and past surgical history.    Review of Systems    I have reviewed and agree with the HPI, ROS, and historical information as entered above. Maria Elena Castellanos, APRN      Objective   /90   Ht 162.6 cm (64\")   Wt 83.5 kg (184 lb)   BMI 31.58 kg/m²     Physical Exam  Vitals and nursing note reviewed. Exam conducted with a chaperone present.   Constitutional:       General: She is not in acute distress.     Appearance: Normal appearance. She is well-developed. She is obese. She is not ill-appearing.   Neck:      Thyroid: No thyroid mass or thyromegaly.   Pulmonary:      Effort: Pulmonary effort is normal. No respiratory distress or retractions.   Chest:      Chest wall: No mass.   Breasts:     Right: Normal. No mass, nipple discharge, skin change or tenderness.      Left: Normal. No mass, nipple discharge, skin change or tenderness.   Abdominal:      General: There is no distension.      Palpations: Abdomen is soft. Abdomen is not rigid. There is no mass.      Tenderness: There is no abdominal tenderness. There is no guarding or rebound.      Hernia: No hernia is present. There is no hernia in the left inguinal area.   Genitourinary:     General: Normal vulva.      Labia:         Right: No rash, tenderness or lesion.         Left: No rash, tenderness or lesion.       Vagina: Normal. No vaginal discharge or lesions.      Cervix: Normal.      Uterus: Normal. Not enlarged, not fixed and not tender.       Adnexa: Right adnexa normal and left adnexa normal.        Right: No mass or tenderness.          Left: No mass or tenderness.        Rectum: No external hemorrhoid.   Musculoskeletal:      Cervical back: No muscular tenderness.   Skin:     General: Skin is warm and dry.   Neurological:      Mental " Status: She is alert and oriented to person, place, and time.   Psychiatric:         Mood and Affect: Mood normal.         Behavior: Behavior normal.            Assessment and Plan    Problem List Items Addressed This Visit    None  Visit Diagnoses       Women's annual routine gynecological examination    -  Primary    Relevant Orders    LIQUID-BASED PAP SMEAR WITH HPV GENOTYPING REGARDLESS OF INTERPRETATION ([facility]ILOET,[facility],MAD)    Breast cancer screening by mammogram        Relevant Orders    Mammo Screening Digital Tomosynthesis Bilateral With CAD    Menopausal state        Menopausal symptoms                GYN annual well woman exam.   Reviewed monthly self breast exams.  Instructed to call with lumps, pain, or breast discharge.  Yearly mammograms ordered.  Ordered mammogram today.  Reviewed exercise as a preventative health measures.   Colonoscopy recommended.  Symptoms of menopausal transition reviewed with patient.  Reviewed risks/benefits of OTC, non-hormonal and hormonal treatment for vasomotor and other menopausal symptoms.  Recommended Flu Vaccine in Fall of each year.  RTC in 1 year or PRN with problems.  Return in about 1 year (around 12/26/2024) for Annual physical.   10. Enc try Estroven.  Then try Veozah-- samples given x one week--- expensive.  Finally estrogen patch and progesterone qhs      Maria Elena Castellanos, APRN  12/26/2023

## 2023-12-26 NOTE — PROGRESS NOTES
December 26, 2023    Hello, may I speak with Gino Maguire?    My name is SUNNY      I am  with [facility]   [facility]  [address]  [facility] [address] [phone].    Before we get started may I verify your date of birth? 1970    I am calling to officially welcome you to our practice and ask about your recent visit. Is this a good time to talk? yes    Tell me about your visit with us. What things went well?  EVERYTHING WAS WONDERFUL        We're always looking for ways to make our patients' experiences even better. Do you have recommendations on ways we may improve?  no    Overall were you satisfied with your first visit to our practice? yes       I appreciate you taking the time to speak with me today. Is there anything else I can do for you? no      Thank you, and have a great day.

## 2023-12-26 NOTE — TELEPHONE ENCOUNTER
Patient calling to verify medications that Maria Elena Castellanos was calling in for her today after her appt.

## 2023-12-26 NOTE — TELEPHONE ENCOUNTER
Pt was here today to see CLAY Kwon for appointment and states she got prescription for 2 medications but did not get a prescription for premarin cream. Spoke with CLAY Kwon and Maria Elena states she will send in the prescription for premarin. Pt v/u.

## 2024-01-03 LAB — REF LAB TEST METHOD: NORMAL

## 2024-01-15 DIAGNOSIS — I10 ESSENTIAL HYPERTENSION: ICD-10-CM

## 2024-01-15 RX ORDER — HYDROCHLOROTHIAZIDE 25 MG/1
25 TABLET ORAL DAILY
Qty: 30 TABLET | Refills: 1 | Status: SHIPPED | OUTPATIENT
Start: 2024-01-15

## 2024-01-23 ENCOUNTER — HOSPITAL ENCOUNTER (EMERGENCY)
Facility: HOSPITAL | Age: 54
Discharge: HOME | End: 2024-01-23
Attending: EMERGENCY MEDICINE | Admitting: EMERGENCY MEDICINE
Payer: COMMERCIAL

## 2024-01-23 VITALS
TEMPERATURE: 98.7 F | BODY MASS INDEX: 31.89 KG/M2 | OXYGEN SATURATION: 97 % | HEIGHT: 63 IN | RESPIRATION RATE: 17 BRPM | SYSTOLIC BLOOD PRESSURE: 162 MMHG | DIASTOLIC BLOOD PRESSURE: 14 MMHG | HEART RATE: 85 BPM | WEIGHT: 180 LBS

## 2024-01-23 DIAGNOSIS — M54.50 ACUTE MIDLINE LOW BACK PAIN WITHOUT SCIATICA: Primary | ICD-10-CM

## 2024-01-23 PROCEDURE — 96372 THER/PROPH/DIAG INJ SC/IM: CPT

## 2024-01-23 PROCEDURE — 63710000001 ONDANSETRON ODT 4 MG TABLET DISPERSIBLE: Performed by: EMERGENCY MEDICINE

## 2024-01-23 PROCEDURE — 25010000002 KETOROLAC TROMETHAMINE PER 15 MG: Performed by: EMERGENCY MEDICINE

## 2024-01-23 PROCEDURE — 99283 EMERGENCY DEPT VISIT LOW MDM: CPT

## 2024-01-23 RX ORDER — ONDANSETRON 4 MG/1
4 TABLET, ORALLY DISINTEGRATING ORAL EVERY 6 HOURS PRN
Qty: 15 TABLET | Refills: 0 | Status: SHIPPED | OUTPATIENT
Start: 2024-01-23

## 2024-01-23 RX ORDER — METHOCARBAMOL 750 MG/1
1500 TABLET, FILM COATED ORAL 3 TIMES DAILY PRN
Qty: 30 TABLET | Refills: 0 | Status: SHIPPED | OUTPATIENT
Start: 2024-01-23

## 2024-01-23 RX ORDER — KETOROLAC TROMETHAMINE 30 MG/ML
30 INJECTION, SOLUTION INTRAMUSCULAR; INTRAVENOUS ONCE
Status: COMPLETED | OUTPATIENT
Start: 2024-01-23 | End: 2024-01-23

## 2024-01-23 RX ORDER — HYDROCODONE BITARTRATE AND ACETAMINOPHEN 7.5; 325 MG/1; MG/1
1 TABLET ORAL ONCE
Status: COMPLETED | OUTPATIENT
Start: 2024-01-23 | End: 2024-01-23

## 2024-01-23 RX ORDER — HYDROCODONE BITARTRATE AND ACETAMINOPHEN 5; 325 MG/1; MG/1
1 TABLET ORAL EVERY 6 HOURS PRN
Qty: 12 TABLET | Refills: 0 | Status: SHIPPED | OUTPATIENT
Start: 2024-01-23

## 2024-01-23 RX ORDER — PREDNISONE 20 MG/1
60 TABLET ORAL DAILY
Qty: 15 TABLET | Refills: 0 | Status: SHIPPED | OUTPATIENT
Start: 2024-01-23

## 2024-01-23 RX ORDER — ONDANSETRON 4 MG/1
4 TABLET, ORALLY DISINTEGRATING ORAL ONCE
Status: COMPLETED | OUTPATIENT
Start: 2024-01-23 | End: 2024-01-23

## 2024-01-23 RX ADMIN — KETOROLAC TROMETHAMINE 30 MG: 30 INJECTION, SOLUTION INTRAMUSCULAR; INTRAVENOUS at 16:14

## 2024-01-23 RX ADMIN — HYDROCODONE BITARTRATE AND ACETAMINOPHEN 1 TABLET: 7.5; 325 TABLET ORAL at 16:28

## 2024-01-23 RX ADMIN — ONDANSETRON 4 MG: 4 TABLET, ORALLY DISINTEGRATING ORAL at 16:28

## 2024-01-23 NOTE — ED PROVIDER NOTES
Subjective   History of Present Illness    Pt presents with back pain.  She was sweeping with a broom this morning.  She felt a tiny pop in her back and then had immediate mild pain . The pain gradually worsened over hours and became severe.  Worse with movement/walking.  After while her legs began to feel a little heavy.  No weakness or numbness.  No pain radiating down legs.  No bowel/bladder dysfunction.  No urinary retention.  Has used a lidocaine patch without relief.    She has a history of back problems.      No fever.  No weakness. No vomiting.  No drug abuse.  No recent trauma.  No history of cancer.    History provided by:  Patient      Review of Systems   Constitutional:  Negative for fever.   Gastrointestinal:  Negative for vomiting.   Genitourinary:  Negative for difficulty urinating.   Musculoskeletal:  Positive for back pain.   Neurological:  Negative for weakness and numbness.   All other systems reviewed and are negative.      Past Medical History:   Diagnosis Date    Abdominal pain     Abdominal pain, RUQ     Check cmp. refer to GB u/s and gen surg eval d/t cholelithiasis noted on CT of abd/pelvis donein ER 1/16    Acute bronchitis     Take cefdinir and medrol dose pack as directed. Use otc mucinex. Continue to rest and push fluids. Call or rtc if no better. Gave phenergan w/ codeine cough syrup 5 ml po prn #120ml, no RF per Dr. Cesar    Acute pharyngitis     Check for monod/t exposure/ Pt will restart flovent as used for esophagitis. If no improvement take the medrol dose pack she was given at least visit    Acute sinusitis     Acute upper respiratory infection     Allergic     Anxiety     Body aches     Bursitis of hip 2015/2016?    Cervical disc disorder 2021    Cholelithiasis     Reviewed CT of adb/pelvis from ER 1/16 showed cholelithiasis. Will refer for gen surgery eval and GB u/s    Chronic pain disorder     Cough     Depression     Eosinophilic esophagitis     Extremity pain      Fibromyalgia, primary     Gestational diabetes     Headache     Hypertension     Influenza     Knee swelling     Labyrinthitis     Low back pain     Lumbar strain     Lumbosacral disc disease     Neck pain     Right hip pain     Rotator cuff syndrome     Shingles     Strain of thoracic region     Viral infection     Viral pharyngitis     Visual impairment        Allergies   Allergen Reactions    Maxalt [Rizatriptan] Anaphylaxis    Sulfa Antibiotics Rash    Sumatriptan Anaphylaxis    Zolmitriptan Anaphylaxis    Labetalol Rash       Past Surgical History:   Procedure Laterality Date    BREAST BIOPSY Left 10/2018    Benign STEREO      SECTION      97, 00, 04, 12    CHOLECYSTECTOMY  2017    COLONOSCOPY      DILATATION AND CURETTAGE      ENDOSCOPY      OOPHORECTOMY Right 2017    due to ovarian cyst    ROTATOR CUFF REPAIR Left 2022    SHOULDER SURGERY  2022    SUBTOTAL HYSTERECTOMY      WISDOM TOOTH EXTRACTION         Family History   Problem Relation Age of Onset    Arthritis Mother     Hypertension Mother     Thyroid disease Mother     Lung cancer Mother         adenocarcinoma, nonsmoking    Arthritis Father     Hypertension Father     Heart disease Father     Stroke Father     Skin cancer Father         nonmelanoma    Arthritis Sister     Kidney disease Sister     Liver disease Sister     Breast cancer Sister 62    Arthritis Brother     Allergic rhinitis Brother     Lung disease Brother         occupational, black lung    Valvular heart disease Maternal Grandmother     Arrhythmia Maternal Grandmother         had pacemaker    Diabetes Maternal Grandfather     Heart disease Maternal Grandfather         numerous heart attacks    Heart failure Paternal Grandmother     Heart disease Paternal Grandmother     Throat cancer Paternal Grandfather     Ovarian cancer Cousin 42    Breast cancer Niece 31    Heart attack Other     Arthritis Other     Hypertension Other     Migraines  Other     Stroke Other     Endometrial cancer Neg Hx     Colon cancer Neg Hx        Social History     Socioeconomic History    Marital status:    Tobacco Use    Smoking status: Former     Packs/day: 1.00     Years: 3.00     Additional pack years: 0.00     Total pack years: 3.00     Types: Cigarettes     Start date: 2006     Quit date: 2009     Years since quittin.4    Smokeless tobacco: Never   Vaping Use    Vaping Use: Never used   Substance and Sexual Activity    Alcohol use: Yes     Comment: Only occasionally every few months?    Drug use: No    Sexual activity: Not Currently     Partners: Male     Birth control/protection: Post-menopausal           Objective   Physical Exam  Vitals and nursing note reviewed.   Constitutional:       General: She is not in acute distress.     Appearance: Normal appearance. She is not ill-appearing.   HENT:      Head: Normocephalic and atraumatic.   Eyes:      General: No scleral icterus.        Right eye: No discharge.         Left eye: No discharge.      Conjunctiva/sclera: Conjunctivae normal.   Cardiovascular:      Rate and Rhythm: Normal rate.   Pulmonary:      Effort: Pulmonary effort is normal. No respiratory distress.   Musculoskeletal:         General: No swelling or deformity.      Cervical back: Normal range of motion and neck supple.      Comments: Tender left lower lumbar paraspinous.   Skin:     General: Skin is dry.      Findings: No rash.   Neurological:      General: No focal deficit present.      Mental Status: She is alert and oriented to person, place, and time. Mental status is at baseline.      Sensory: Sensation is intact.      Motor: Motor function is intact. No weakness.      Deep Tendon Reflexes:      Reflex Scores:       Patellar reflexes are 2+ on the right side and 2+ on the left side.     Comments: SLR absent both sides.  No ankle clonus.  No weakness proximal or distal.  EHL 5/5 bilaterally.     Psychiatric:         Mood and  Affect: Mood normal.         Behavior: Behavior normal.         Thought Content: Thought content normal.         Procedures           ED Course    I reviewed old records.  MRI 10/4/2016 notes indicate multiple mild disk bulges in the lumbar spine.    No red flags on history, exam or vitals.    Pain meds ordered.  Extent of medication limited by ED saturation and pt location in PIT area.  She reports improvement in pain with meds, from 7 to 4.  She declined further pain meds and is comfortable with plan of discharge with scripts and outpatient followup.                                 CHITRA reviewed by Álvaro Layton MD       Medical Decision Making  Problems Addressed:  Acute midline low back pain without sciatica: complicated acute illness or injury    Risk  Prescription drug management.        Final diagnoses:   Acute midline low back pain without sciatica       ED Disposition  ED Disposition       ED Disposition   Discharge    Condition   Stable    Comment   --               Stacie Duran MD  [address] [place], [place] [id number] [phone]    In 1 week           Medication List        New Prescriptions      HYDROcodone-acetaminophen 5-325 MG per tablet  Commonly known as: NORCO  Take 1 tablet by mouth Every 6 (Six) Hours As Needed for Moderate Pain.     methocarbamol 750 MG tablet  Commonly known as: ROBAXIN  Take 2 tablets by mouth 3 (Three) Times a Day As Needed for Muscle Spasms.     predniSONE 20 MG tablet  Commonly known as: DELTASONE  Take 3 tablets by mouth Daily.            Changed      * ondansetron ODT 4 MG disintegrating tablet  Commonly known as: ZOFRAN-ODT  Place 1 tablet on the tongue Every 6 (Six) Hours As Needed for Nausea or Vomiting.  What changed: Another medication with the same name was added. Make sure you understand how and when to take each.     * ondansetron ODT 4 MG disintegrating tablet  Commonly known as: ZOFRAN-ODT  Place 1 tablet on the tongue Every 6 (Six) Hours As  Needed for Nausea or Vomiting.  What changed: You were already taking a medication with the same name, and this prescription was added. Make sure you understand how and when to take each.           * This list has 2 medication(s) that are the same as other medications prescribed for you. Read the directions carefully, and ask your doctor or other care provider to review them with you.                   Where to Get Your Medications        These medications were sent to [id number] - [place], [place] - [address] AT [facility] - [phone] [facility] [phone]   [address] [facility] [address]      Phone: [phone]   HYDROcodone-acetaminophen 5-325 MG per tablet  methocarbamol 750 MG tablet  ondansetron ODT 4 MG disintegrating tablet  predniSONE 20 MG tablet            Álvaro Layton MD  01/23/24 1957

## 2024-01-24 ENCOUNTER — TELEPHONE (OUTPATIENT)
Dept: NEUROLOGY | Facility: CLINIC | Age: 54
End: 2024-01-24
Payer: COMMERCIAL

## 2024-01-24 NOTE — TELEPHONE ENCOUNTER
Caller: Gino Maguire     Relationship: SELF    Best call back number: [phone] (home)     What is your medical concern? LOWER BACK PAIN    How long has this issue been going on? 1/23/24    Is your provider already aware of this issue? NO    Have you been treated for this issue? PATIENT WENT TO ED ON 1.23.24 BUT DID NOT SEE ANY SPECIALTY WHILE THERE. PATIENT IS HAVE PAIN TRYING TO STAND AND WALK. SHE STATED SHE NEEDS ASSISTANCE TO MOVE SOMETIMES.     PATIENT WAS GIVEN PREDNISONE, MUSCLE RELAXER'S, PAIN MEDS AND ZOFRAN.     ADVISED PATIENT TO ALSO CONTACT HER PCP DUE TO THIS BEING A DIFFERENT ISSUE THAN WHAT SHE WAS SEEN FOR IN THE PAST. SHE V/U.     PLEASE REVIEW AND ADVISE  THANK YOU

## 2024-01-26 NOTE — TELEPHONE ENCOUNTER
"Relay     \"LVM with Gino to see if she would like to schedule appt. Would need to be establish new provider as she was last seen by Dr. Coronado in 2021.       Jose VILLANUEVA \"                "

## 2024-02-16 ENCOUNTER — OFFICE VISIT (OUTPATIENT)
Dept: INTERNAL MEDICINE | Facility: CLINIC | Age: 54
End: 2024-02-16
Payer: COMMERCIAL

## 2024-02-16 VITALS
TEMPERATURE: 97.5 F | HEART RATE: 78 BPM | WEIGHT: 181 LBS | RESPIRATION RATE: 18 BRPM | HEIGHT: 63 IN | DIASTOLIC BLOOD PRESSURE: 80 MMHG | BODY MASS INDEX: 32.07 KG/M2 | SYSTOLIC BLOOD PRESSURE: 122 MMHG | OXYGEN SATURATION: 99 %

## 2024-02-16 DIAGNOSIS — R19.7 DIARRHEA, UNSPECIFIED TYPE: ICD-10-CM

## 2024-02-16 DIAGNOSIS — J02.9 SORE THROAT: Primary | ICD-10-CM

## 2024-02-16 DIAGNOSIS — R05.1 ACUTE COUGH: ICD-10-CM

## 2024-02-16 DIAGNOSIS — J10.1 INFLUENZA B: ICD-10-CM

## 2024-02-16 LAB
EXPIRATION DATE: ABNORMAL
EXPIRATION DATE: NORMAL
FLUAV AG UPPER RESP QL IA.RAPID: NOT DETECTED
FLUBV AG UPPER RESP QL IA.RAPID: DETECTED
INTERNAL CONTROL: ABNORMAL
INTERNAL CONTROL: NORMAL
Lab: ABNORMAL
Lab: NORMAL
S PYO AG THROAT QL: NEGATIVE
SARS-COV-2 AG UPPER RESP QL IA.RAPID: NOT DETECTED

## 2024-02-16 PROCEDURE — 99214 OFFICE O/P EST MOD 30 MIN: CPT | Performed by: NURSE PRACTITIONER

## 2024-02-16 PROCEDURE — 87880 STREP A ASSAY W/OPTIC: CPT | Performed by: NURSE PRACTITIONER

## 2024-02-16 PROCEDURE — 87428 SARSCOV & INF VIR A&B AG IA: CPT | Performed by: NURSE PRACTITIONER

## 2024-02-16 RX ORDER — DEXTROMETHORPHAN HYDROBROMIDE AND PROMETHAZINE HYDROCHLORIDE 15; 6.25 MG/5ML; MG/5ML
5 SYRUP ORAL 4 TIMES DAILY PRN
Qty: 118 ML | Refills: 0 | Status: SHIPPED | OUTPATIENT
Start: 2024-02-16

## 2024-02-16 RX ORDER — BENZONATATE 200 MG/1
200 CAPSULE ORAL 3 TIMES DAILY PRN
Qty: 20 CAPSULE | Refills: 0 | Status: SHIPPED | OUTPATIENT
Start: 2024-02-16 | End: 2024-02-23

## 2024-03-21 ENCOUNTER — TELEPHONE (OUTPATIENT)
Dept: OBSTETRICS AND GYNECOLOGY | Facility: CLINIC | Age: 54
End: 2024-03-21
Payer: COMMERCIAL

## 2024-03-21 DIAGNOSIS — N95.1 MENOPAUSAL SYMPTOMS: ICD-10-CM

## 2024-03-21 DIAGNOSIS — T38.5X5A ADVERSE EFFECT OF FEMALE HORMONE REPLACEMENT THERAPY, INITIAL ENCOUNTER: Primary | ICD-10-CM

## 2024-03-21 NOTE — TELEPHONE ENCOUNTER
PT STATES JOHN PUT HER ON HORMONE PILLS AND PATCHES IN DECEMBER AND SHE IS WANTING TO KNOW IF SHE CAN DECREASE THE DOSE BY HALF. SHE STATES SHE IS HAVING SOME SYMPTOMS THAT SHE DOES NOT LIKE. SHE STATES THE CREAM IS OKAY IT IS JUST THE PATCHES AND PILLS SHE WANTS TO SEE ABOUT DECREASING

## 2024-03-21 NOTE — TELEPHONE ENCOUNTER
Patient called to state that she thinks her new estrogen patch is causing increase in depression. She would like to decrease it. Changed rx to 0.025-call in two weeks if not improving. [place].

## 2024-04-21 DIAGNOSIS — I10 ESSENTIAL HYPERTENSION: ICD-10-CM

## 2024-04-22 RX ORDER — HYDROCHLOROTHIAZIDE 25 MG/1
25 TABLET ORAL DAILY
Qty: 30 TABLET | Refills: 1 | OUTPATIENT
Start: 2024-04-22

## 2024-07-12 DIAGNOSIS — M17.11 PRIMARY OSTEOARTHRITIS OF RIGHT KNEE: ICD-10-CM

## 2024-07-12 DIAGNOSIS — M17.12 PRIMARY OSTEOARTHRITIS OF LEFT KNEE: ICD-10-CM

## 2024-07-12 RX ORDER — MELOXICAM 15 MG/1
TABLET ORAL
Qty: 90 TABLET | Refills: 1 | Status: SHIPPED | OUTPATIENT
Start: 2024-07-12

## 2024-08-23 ENCOUNTER — OFFICE VISIT (OUTPATIENT)
Dept: INTERNAL MEDICINE | Facility: CLINIC | Age: 54
End: 2024-08-23
Payer: COMMERCIAL

## 2024-08-23 VITALS — HEIGHT: 63 IN | OXYGEN SATURATION: 97 % | HEART RATE: 87 BPM | WEIGHT: 189.9 LBS | BODY MASS INDEX: 33.65 KG/M2

## 2024-08-23 DIAGNOSIS — I10 ESSENTIAL HYPERTENSION: ICD-10-CM

## 2024-08-23 DIAGNOSIS — B86 SCABIES: Primary | ICD-10-CM

## 2024-08-23 PROCEDURE — 99213 OFFICE O/P EST LOW 20 MIN: CPT | Performed by: NURSE PRACTITIONER

## 2024-08-23 RX ORDER — PERMETHRIN 50 MG/G
1 CREAM TOPICAL ONCE
Qty: 60 G | Refills: 0 | Status: SHIPPED | OUTPATIENT
Start: 2024-08-23 | End: 2024-08-23

## 2024-08-23 RX ORDER — HYDROCHLOROTHIAZIDE 25 MG/1
25 TABLET ORAL DAILY
Qty: 30 TABLET | Refills: 1 | Status: SHIPPED | OUTPATIENT
Start: 2024-08-23

## 2024-08-23 RX ORDER — IVERMECTIN 3 MG/1
3 TABLET ORAL ONCE
Qty: 1 TABLET | Refills: 0 | Status: SHIPPED | OUTPATIENT
Start: 2024-08-23 | End: 2024-08-23

## 2024-08-23 NOTE — PROGRESS NOTES
Office Note     Name: Gino Maguire    : 1970     MRN: [id number]     Chief Complaint  Rash (Patient reports her rash started around Saturday,  with no relief and it has only gotten worse. Patient states it is on her face, back, stomach, arms, neck, and buttox. Patient states she feels like it is scabies. Patient states she has had scabies in 2016. )    Subjective     History of Present Illness:  Gino Maguire is a 54 y.o. female who presents today for evaluation of a rash.  Patient reports onset of symptoms was around .  Patient has noted the rash has spread to various parts of her body.  She suspects this is scabies.  She had scabies back in 2016 and reports the symptoms are very similar.  She reports the areas affected are her face, back, stomach, arms, neck, and buttocks.  She reports there are several areas under her bra line and around her waistline.  She is concerned as she feels like her symptoms are worsening.  She denies any recent yard work or gardening.  She denies any recent new medications.  She has been using a new detergent but reports using this for the past few months prior to symptom onset.  Patient follows with Dr. Duran for chronic conditions.  No further complaints or concerns at this time.        Past Medical History:   Diagnosis Date    Abdominal pain     Abdominal pain, RUQ     Check cmp. refer to [facility] u/s and gen surg eval d/t cholelithiasis noted on CT of abd/pelvis donein ER     Acute bronchitis     Take cefdinir and medrol dose pack as directed. Use otc mucinex. Continue to rest and push fluids. Call or rtc if no better. Gave phenergan w/ codeine cough syrup 5 ml po prn #120ml, no RF per Dr. Cesar    Acute pharyngitis     Check for monod/t exposure/ Pt will restart flovent as used for esophagitis. If no improvement take the medrol dose pack she was given at least visit    Acute sinusitis     Acute upper respiratory infection     Allergic      Anxiety     Body aches     Bursitis of hip ?    Cervical disc disorder     Cholelithiasis     Reviewed CT of adb/pelvis from ER  showed cholelithiasis. Will refer for gen surgery eval and GB u/s    Chronic pain disorder     Cough     Depression     Eosinophilic esophagitis     Extremity pain     Fibromyalgia, primary     Gestational diabetes     Headache     Hypertension     Influenza     Knee swelling     Labyrinthitis     Low back pain     Lumbar strain     Lumbosacral disc disease     Neck pain     Right hip pain     Rotator cuff syndrome     Shingles     Strain of thoracic region     Viral infection     Viral pharyngitis     Visual impairment        Past Surgical History:   Procedure Laterality Date    BREAST BIOPSY Left 10/2018    Benign STEREO      SECTION      97, 00, 04, 12    CHOLECYSTECTOMY  2017    COLONOSCOPY      DILATATION AND CURETTAGE      ENDOSCOPY      OOPHORECTOMY Right 2017    due to ovarian cyst    ROTATOR CUFF REPAIR Left 2022    SHOULDER SURGERY  2022    SUBTOTAL HYSTERECTOMY      WISDOM TOOTH EXTRACTION         Social History     Socioeconomic History    Marital status:    Tobacco Use    Smoking status: Former     Current packs/day: 0.00     Average packs/day: 1 pack/day for 3.0 years (3.0 ttl pk-yrs)     Types: Cigarettes     Start date: 2006     Quit date: 2009     Years since quitting: 15.0    Smokeless tobacco: Never   Vaping Use    Vaping status: Never Used   Substance and Sexual Activity    Alcohol use: Yes     Comment: Only occasionally every few months?    Drug use: No    Sexual activity: Not Currently     Partners: Male     Birth control/protection: Post-menopausal         Current Outpatient Medications:     Acetylcysteine (NAC) capsule capsule, Take  by mouth., Disp: , Rfl:     albuterol sulfate  (90 Base) MCG/ACT inhaler, Inhale 2 puffs Every 4 (Four) Hours As Needed for Wheezing or Shortness  of Air., Disp: 18 g, Rfl: 11    ALPRAZolam (XANAX) 0.25 MG tablet, Take 1 tablet by mouth At Night As Needed for Anxiety or Sleep. One po bid prn, Disp: 30 tablet, Rfl: 0    aspirin 81 MG EC tablet, Take 1 tablet by mouth Daily., Disp: , Rfl:     buPROPion XL (WELLBUTRIN XL) 150 MG 24 hr tablet, Take 1 tablet by mouth Daily., Disp: , Rfl:     cetirizine (zyrTEC) 10 MG tablet, Take 1 tablet by mouth Daily., Disp: , Rfl:     estradiol (Climara) 0.025 MG/24HR patch, Place 1 patch on the skin as directed by provider 1 (One) Time Per Week., Disp: 4 patch, Rfl: 11    estradiol (ESTRACE VAGINAL) 0.1 MG/GM vaginal cream, 1/2 gram per vagina twice weekly, Disp: 1 each, Rfl: 2    fluticasone (FLONASE) 50 MCG/ACT nasal spray, 2 sprays into the nostril(s) as directed by provider., Disp: , Rfl:     hydroCHLOROthiazide 25 MG tablet, Take 1 tablet by mouth Daily., Disp: 30 tablet, Rfl: 1    HYDROcodone-acetaminophen (NORCO) 5-325 MG per tablet, Take 1 tablet by mouth Every 6 (Six) Hours As Needed for Moderate Pain., Disp: 12 tablet, Rfl: 0    L-methylfolate Calcium 15 MG tablet, Take 1 tablet by mouth Daily., Disp: , Rfl:     MAGNESIUM PO, Take  by mouth., Disp: , Rfl:     meloxicam (MOBIC) 15 MG tablet, TAKE 1 TABLET BY MOUTH DAILY WITH FOOD, Disp: 90 tablet, Rfl: 1    methocarbamol (ROBAXIN) 750 MG tablet, Take 2 tablets by mouth 3 (Three) Times a Day As Needed for Muscle Spasms., Disp: 30 tablet, Rfl: 0    Omega 3-6-9 Fatty Acids (OMEGA 3-6-9 COMPLEX PO), Take  by mouth., Disp: , Rfl:     ondansetron ODT (ZOFRAN-ODT) 4 MG disintegrating tablet, Place 1 tablet on the tongue Every 6 (Six) Hours As Needed for Nausea or Vomiting., Disp: 15 tablet, Rfl: 0    ondansetron ODT (ZOFRAN-ODT) 4 MG disintegrating tablet, Place 1 tablet on the tongue Every 6 (Six) Hours As Needed for Nausea or Vomiting., Disp: 15 tablet, Rfl: 0    POTASSIUM PO, Take  by mouth., Disp: , Rfl:     predniSONE (DELTASONE) 20 MG tablet, Take 3 tablets by mouth  "Daily., Disp: 15 tablet, Rfl: 0    Progesterone (Prometrium) 100 MG capsule, Take 1 capsule by mouth every night at bedtime., Disp: 30 capsule, Rfl: 12    promethazine (PHENERGAN) 25 MG tablet, Take 1 tablet by mouth Every 8 (Eight) Hours As Needed for Nausea or Vomiting., Disp: 21 tablet, Rfl: 2    promethazine-dextromethorphan (PROMETHAZINE-DM) 6.25-15 MG/5ML syrup, Take 5 mL by mouth 4 (Four) Times a Day As Needed for Cough., Disp: 118 mL, Rfl: 0    propranolol (INDERAL) 10 MG tablet, TAKE 1 TABLET BY MOUTH TWICE DAILY, Disp: 60 tablet, Rfl: 5    vitamin C (ASCORBIC ACID) 250 MG tablet, Take 1 tablet by mouth Daily., Disp: , Rfl:     vitamin D (ERGOCALCIFEROL) 1.25 MG (22583 UT) capsule capsule, Take 1 capsule by mouth Every 7 (Seven) Days., Disp: , Rfl:     Objective     Vital Signs  Pulse 87   Ht 160 cm (63\")   Wt 86.1 kg (189 lb 14.4 oz)   SpO2 97%   BMI 33.64 kg/m²   Estimated body mass index is 33.64 kg/m² as calculated from the following:    Height as of this encounter: 160 cm (63\").    Weight as of this encounter: 86.1 kg (189 lb 14.4 oz).    BMI is >= 30 and <35. (Class 1 Obesity). The following options were offered after discussion;: Not addressed at this visit      Physical Exam  Constitutional:       General: She is not in acute distress.     Appearance: Normal appearance. She is not ill-appearing.   HENT:      Head: Normocephalic and atraumatic.      Nose: Nose normal.   Eyes:      Extraocular Movements: Extraocular movements intact.      Conjunctiva/sclera: Conjunctivae normal.      Pupils: Pupils are equal, round, and reactive to light.   Cardiovascular:      Rate and Rhythm: Normal rate.   Pulmonary:      Effort: Pulmonary effort is normal. No respiratory distress.   Musculoskeletal:         General: Normal range of motion.      Cervical back: Neck supple.   Skin:     General: Skin is warm and dry.      Findings: Lesion and rash present.   Neurological:      General: No focal deficit present. "      Mental Status: She is alert and oriented to person, place, and time. Mental status is at baseline.   Psychiatric:         Mood and Affect: Mood normal.         Behavior: Behavior normal.         Thought Content: Thought content normal.         Judgment: Judgment normal.          Assessment and Plan     Diagnoses and all orders for this visit:    1. Scabies (Primary)  -     permethrin (ELIMITE) 5 % cream; Apply 1 Application topically to the appropriate area as directed 1 (One) Time for 1 dose.  Dispense: 60 g; Refill: 0  -     ivermectin (STROMECTOL) 3 MG tablet tablet; Take 1 tablet by mouth 1 (One) Time for 1 dose.  Dispense: 1 tablet; Refill: 0    2. Essential hypertension  -     hydroCHLOROthiazide 25 MG tablet; Take 1 tablet by mouth Daily.  Dispense: 30 tablet; Refill: 1        Follow Up  Return if symptoms worsen or fail to improve, for Follow up with Dr. Duran.    CLAY Connolly    Part of this note may be an electronic transcription/translation of spoken language to printed text using the Dragon Dictation System.

## 2024-08-24 ENCOUNTER — TELEPHONE (OUTPATIENT)
Dept: INTERNAL MEDICINE | Facility: CLINIC | Age: 54
End: 2024-08-24
Payer: COMMERCIAL

## 2024-08-24 NOTE — TELEPHONE ENCOUNTER
Called by the pharmacist at [facility] Friday after hours regarding the medication ivermectin.  Reports the dosage was for 3 mg x 1 but appropriate dosing for scabies would be 15 mg - 18 mg per Pt's weight. Adv ok to change dose to 15mg x 1 dose. He will dispense accordingly.

## 2024-09-28 NOTE — TELEPHONE ENCOUNTER
-Dc doxy  I sent RX for zpak.  By culture would still cover the staph infection.

## 2024-11-13 DIAGNOSIS — I10 ESSENTIAL HYPERTENSION: ICD-10-CM

## 2024-11-13 RX ORDER — HYDROCHLOROTHIAZIDE 25 MG/1
25 TABLET ORAL DAILY
Qty: 30 TABLET | Refills: 0 | Status: SHIPPED | OUTPATIENT
Start: 2024-11-13

## 2024-12-04 RX ORDER — ESTRADIOL 0.1 MG/G
CREAM VAGINAL
Qty: 42.5 G | Refills: 0 | Status: SHIPPED | OUTPATIENT
Start: 2024-12-04

## 2024-12-27 ENCOUNTER — TELEPHONE (OUTPATIENT)
Dept: OBSTETRICS AND GYNECOLOGY | Facility: CLINIC | Age: 54
End: 2024-12-27

## 2024-12-27 NOTE — TELEPHONE ENCOUNTER
Caller: Gino Maguire    Relationship:  Self    Best call back number: [phone]      PATIENT CALLED REQUESTING TO CANCEL SAME DAY APPT.    Did the patient call AFTER the start time of their scheduled appointment?  []YES  [x]NO    Was the patient's appointment rescheduled? [x]YES  []NO    Any additional information: PATIENT IS SICK AND RUNNING FEVER

## 2025-01-20 DIAGNOSIS — N95.1 MENOPAUSAL SYMPTOMS: ICD-10-CM

## 2025-01-20 RX ORDER — PROGESTERONE 100 MG/1
100 CAPSULE ORAL
Qty: 30 CAPSULE | Refills: 12 | Status: SHIPPED | OUTPATIENT
Start: 2025-01-20 | End: 2025-01-23 | Stop reason: SDUPTHER

## 2025-01-20 RX ORDER — ESTRADIOL 0.1 MG/G
CREAM VAGINAL
Qty: 42.5 G | Refills: 0 | Status: SHIPPED | OUTPATIENT
Start: 2025-01-20 | End: 2025-01-23 | Stop reason: SDUPTHER

## 2025-01-23 ENCOUNTER — OFFICE VISIT (OUTPATIENT)
Dept: OBSTETRICS AND GYNECOLOGY | Facility: CLINIC | Age: 55
End: 2025-01-23
Payer: COMMERCIAL

## 2025-01-23 VITALS
SYSTOLIC BLOOD PRESSURE: 132 MMHG | WEIGHT: 193.4 LBS | HEIGHT: 63 IN | BODY MASS INDEX: 34.27 KG/M2 | DIASTOLIC BLOOD PRESSURE: 86 MMHG

## 2025-01-23 DIAGNOSIS — Z79.890 HORMONE REPLACEMENT THERAPY (HRT): ICD-10-CM

## 2025-01-23 DIAGNOSIS — N95.2 VAGINAL ATROPHY: ICD-10-CM

## 2025-01-23 DIAGNOSIS — N95.1 MENOPAUSAL STATE: ICD-10-CM

## 2025-01-23 DIAGNOSIS — N95.1 MENOPAUSAL SYMPTOMS: ICD-10-CM

## 2025-01-23 DIAGNOSIS — T38.5X5A ADVERSE EFFECT OF FEMALE HORMONE REPLACEMENT THERAPY, INITIAL ENCOUNTER: ICD-10-CM

## 2025-01-23 DIAGNOSIS — Z01.419 WOMEN'S ANNUAL ROUTINE GYNECOLOGICAL EXAMINATION: Primary | ICD-10-CM

## 2025-01-23 DIAGNOSIS — Z12.31 BREAST CANCER SCREENING BY MAMMOGRAM: ICD-10-CM

## 2025-01-23 DIAGNOSIS — E66.9 OBESITY (BMI 30-39.9): ICD-10-CM

## 2025-01-23 RX ORDER — PROGESTERONE 100 MG/1
100 CAPSULE ORAL
Qty: 30 CAPSULE | Refills: 12 | Status: SHIPPED | OUTPATIENT
Start: 2025-01-23

## 2025-01-23 RX ORDER — ESTRADIOL 0.1 MG/G
CREAM VAGINAL
Qty: 42.5 G | Refills: 3 | Status: SHIPPED | OUTPATIENT
Start: 2025-01-23

## 2025-01-23 RX ORDER — ESTRADIOL 0.03 MG/D
1 PATCH TRANSDERMAL WEEKLY
Qty: 4 PATCH | Refills: 11 | Status: SHIPPED | OUTPATIENT
Start: 2025-01-23

## 2025-01-23 NOTE — PROGRESS NOTES
Gynecologic Annual Exam Note        GYN Annual Exam     CC - Here for annual exam.        HPI  Gino Maguire is a 54 y.o. female, , who presents for annual well woman exam as a established patient.  She has had an ablation. Denies vaginal bleeding.   There were no changes to her medical or surgical history since her last visit. Marital Status: .  She is sexually active. She has not had new partners.. STD testing recommendations have been explained to the patient and she declines STD testing.    She started estradiol patch 0.05 mg weekly and Prometrium 100 mg qhs one year ago.  She had improvement in hot flushes and other menopause symptoms.  However, after using it for 3 weeks, she noticed worsening of depression symptoms.  It worsens weekly when it is time to change the patch.  She wants to decrease dose of estrogen and increase progesterone.  She has trouble remembering to use estrogen cream twice a week; when she does use it, it helps with dryness.    Additional OB/GYN History   On HRT? Yes. Details: estradiol patch and cream and progesterone tablets    Last Pap : 2023. Results: negative. HPV: negative.   Last Completed Pap Smear            Ordered - PAP SMEAR (Every 3 Years) Ordered on 2023  LIQUID-BASED PAP SMEAR WITH HPV GENOTYPING REGARDLESS OF INTERPRETATION (VIOLET,COR,MAD)    2018  Done                  History of abnormal Pap smear: no  Family history of uterine, colon, breast, or ovarian cancer: yes - sister and niece- breast cancer cousin- ovarian cancer  Performs monthly Self-Breast Exam: yes  Last mammogram: 2021. Done at . There is a copy in the chart.    Last colonoscopy:  12/15/2017    Last Completed Colonoscopy            COLORECTAL CANCER SCREENING (COLONOSCOPY - Every 10 Years) Next due on 12/15/2027      12/15/2017  COLONOSCOPY (Done)                    She has never had a bone density scan  Exercises Regularly: yes  Feelings of  Anxiety or Depression: yes - managed with medication      Tobacco Usage?: No       Current Outpatient Medications:     Acetylcysteine (NAC) capsule capsule, Take  by mouth., Disp: , Rfl:     albuterol sulfate  (90 Base) MCG/ACT inhaler, Inhale 2 puffs Every 4 (Four) Hours As Needed for Wheezing or Shortness of Air., Disp: 18 g, Rfl: 11    ALPRAZolam (XANAX) 0.25 MG tablet, Take 1 tablet by mouth At Night As Needed for Anxiety or Sleep. One po bid prn, Disp: 30 tablet, Rfl: 0    aspirin 81 MG EC tablet, Take 1 tablet by mouth Daily., Disp: , Rfl:     buPROPion XL (WELLBUTRIN XL) 150 MG 24 hr tablet, Take 1 tablet by mouth Daily., Disp: , Rfl:     cetirizine (zyrTEC) 10 MG tablet, Take 1 tablet by mouth Daily., Disp: , Rfl:     estradiol (Climara) 0.025 MG/24HR patch, Place 1 patch on the skin as directed by provider 1 (One) Time Per Week., Disp: 4 patch, Rfl: 11    estradiol (ESTRACE) 0.1 MG/GM vaginal cream, USE 0.5 GRAM VAGINALLY 2 TIMES A WEEK, Disp: 42.5 g, Rfl: 0    fluticasone (FLONASE) 50 MCG/ACT nasal spray, Administer 2 sprays into the nostril(s) as directed by provider., Disp: , Rfl:     hydroCHLOROthiazide 25 MG tablet, TAKE 1 TABLET BY MOUTH DAILY, Disp: 30 tablet, Rfl: 0    HYDROcodone-acetaminophen (NORCO) 5-325 MG per tablet, Take 1 tablet by mouth Every 6 (Six) Hours As Needed for Moderate Pain., Disp: 12 tablet, Rfl: 0    L-methylfolate Calcium 15 MG tablet, Take 1 tablet by mouth Daily., Disp: , Rfl:     MAGNESIUM PO, Take  by mouth., Disp: , Rfl:     meloxicam (MOBIC) 15 MG tablet, TAKE 1 TABLET BY MOUTH DAILY WITH FOOD, Disp: 90 tablet, Rfl: 1    methocarbamol (ROBAXIN) 750 MG tablet, Take 2 tablets by mouth 3 (Three) Times a Day As Needed for Muscle Spasms., Disp: 30 tablet, Rfl: 0    Omega 3-6-9 Fatty Acids (OMEGA 3-6-9 COMPLEX PO), Take  by mouth., Disp: , Rfl:     ondansetron ODT (ZOFRAN-ODT) 4 MG disintegrating tablet, Place 1 tablet on the tongue Every 6 (Six) Hours As Needed for  Nausea or Vomiting., Disp: 15 tablet, Rfl: 0    ondansetron ODT (ZOFRAN-ODT) 4 MG disintegrating tablet, Place 1 tablet on the tongue Every 6 (Six) Hours As Needed for Nausea or Vomiting., Disp: 15 tablet, Rfl: 0    POTASSIUM PO, Take  by mouth., Disp: , Rfl:     predniSONE (DELTASONE) 20 MG tablet, Take 3 tablets by mouth Daily., Disp: 15 tablet, Rfl: 0    Progesterone (PROMETRIUM) 100 MG capsule, TAKE 1 CAPSULE BY MOUTH EVERY NIGHT AT BEDTIME, Disp: 30 capsule, Rfl: 12    promethazine (PHENERGAN) 25 MG tablet, Take 1 tablet by mouth Every 8 (Eight) Hours As Needed for Nausea or Vomiting., Disp: 21 tablet, Rfl: 2    promethazine-dextromethorphan (PROMETHAZINE-DM) 6.25-15 MG/5ML syrup, Take 5 mL by mouth 4 (Four) Times a Day As Needed for Cough., Disp: 118 mL, Rfl: 0    propranolol (INDERAL) 10 MG tablet, TAKE 1 TABLET BY MOUTH TWICE DAILY, Disp: 60 tablet, Rfl: 5    vitamin C (ASCORBIC ACID) 250 MG tablet, Take 1 tablet by mouth Daily., Disp: , Rfl:     vitamin D (ERGOCALCIFEROL) 1.25 MG (04445 UT) capsule capsule, Take 1 capsule by mouth Every 7 (Seven) Days., Disp: , Rfl:     Patient is requesting refills of estradiol and progesterone.    OB History          4    Para   4    Term   4            AB        Living             SAB        IAB        Ectopic        Molar        Multiple        Live Births                    Past Medical History:   Diagnosis Date    Abdominal pain     Abdominal pain, RUQ     Check cmp. refer to [facility] u/s and gen surg eval d/t cholelithiasis noted on CT of abd/pelvis donein ER     Acute bronchitis     Take cefdinir and medrol dose pack as directed. Use otc mucinex. Continue to rest and push fluids. Call or rtc if no better. Gave phenergan w/ codeine cough syrup 5 ml po prn #120ml, no RF per Dr. Cesar    Acute pharyngitis     Check for monod/t exposure/ Pt will restart flovent as used for esophagitis. If no improvement take the medrol dose pack she was given at least visit     Acute sinusitis     Acute upper respiratory infection     Allergic     Anxiety     Body aches     Bursitis of hip ?    Cervical disc disorder     Cholelithiasis     Reviewed CT of adb/pelvis from ER  showed cholelithiasis. Will refer for gen surgery eval and GB u/s    Chronic pain disorder     Cough     Depression     Eosinophilic esophagitis     Extremity pain     Fibromyalgia, primary     Gestational diabetes     Headache     Hypertension     Influenza     Knee swelling     Labyrinthitis     Low back pain     Lumbar strain     Lumbosacral disc disease     Neck pain     Right hip pain     Rotator cuff syndrome     Shingles     Strain of thoracic region     Viral infection     Viral pharyngitis     Visual impairment         Past Surgical History:   Procedure Laterality Date    BREAST BIOPSY Left 10/2018    Benign STEREO      SECTION      97, 00, 04, 12    CHOLECYSTECTOMY  2017    COLONOSCOPY      DILATATION AND CURETTAGE      ENDOSCOPY      OOPHORECTOMY Right 2017    due to ovarian cyst    ROTATOR CUFF REPAIR Left 2022    SHOULDER SURGERY  2022    SUBTOTAL HYSTERECTOMY      WISDOM TOOTH EXTRACTION         Health Maintenance   Topic Date Due    Pneumococcal Vaccine 0-64 (1 of 2 - PCV) Never done    ZOSTER VACCINE (1 of 2) Never done    ANNUAL PHYSICAL  2022    MAMMOGRAM  2022    TDAP/TD VACCINES (2 - Td or Tdap) 2022    INFLUENZA VACCINE  2024    COVID-19 Vaccine ( - - season) Never done    LIPID PANEL  2024    Annual Gynecologic Pelvic and Breast Exam  2024    BMI FOLLOWUP  2025    PAP SMEAR  2026    COLORECTAL CANCER SCREENING  12/15/2027    HEPATITIS C SCREENING  Completed       The additional following portions of the patient's history were reviewed and updated as appropriate: allergies, current medications, past family history, past medical history, past social history, and past surgical  "history.    Review of Systems    I have reviewed and agree with the HPI, ROS, and historical information as entered above. Maria Elena Castellanos, APRN      Objective   /86   Ht 160 cm (63\")   Wt 87.7 kg (193 lb 6.4 oz)   BMI 34.26 kg/m²     Physical Exam  Vitals and nursing note reviewed. Exam conducted with a chaperone present.   Constitutional:       General: She is not in acute distress.     Appearance: Normal appearance. She is well-developed. She is obese. She is not ill-appearing.   Neck:      Thyroid: No thyroid mass or thyromegaly.   Pulmonary:      Effort: Pulmonary effort is normal. No respiratory distress or retractions.   Chest:      Chest wall: No mass.   Breasts:     Right: Normal. No mass, nipple discharge, skin change or tenderness.      Left: Normal. No mass, nipple discharge, skin change or tenderness.   Abdominal:      General: There is no distension.      Palpations: Abdomen is soft. Abdomen is not rigid. There is no mass.      Tenderness: There is no abdominal tenderness. There is no guarding or rebound.      Hernia: No hernia is present. There is no hernia in the left inguinal area.   Genitourinary:     General: Normal vulva.      Labia:         Right: No rash, tenderness or lesion.         Left: No rash, tenderness or lesion.       Vagina: Normal. No vaginal discharge or lesions.      Cervix: Normal.      Uterus: Normal. Not enlarged, not fixed and not tender.       Adnexa: Right adnexa normal and left adnexa normal.        Right: No mass or tenderness.          Left: No mass or tenderness.        Rectum: No external hemorrhoid.   Musculoskeletal:      Cervical back: No muscular tenderness.   Skin:     General: Skin is warm and dry.   Neurological:      Mental Status: She is alert and oriented to person, place, and time.   Psychiatric:         Mood and Affect: Mood normal.         Behavior: Behavior normal.            Assessment and Plan    Problem List Items Addressed This Visit  "   None  Visit Diagnoses       Women's annual routine gynecological examination    -  Primary    Relevant Orders    LIQUID-BASED PAP SMEAR WITH HPV GENOTYPING REGARDLESS OF INTERPRETATION (VIOLET,COR,MAD)    Menopausal state        Menopausal symptoms        Breast cancer screening by mammogram        Relevant Orders    Mammo Screening Digital Tomosynthesis Bilateral With CAD    Hormone replacement therapy (HRT)        Obesity (BMI 30-39.9)                GYN annual well woman exam.   Reviewed monthly self breast exams.  Instructed to call with lumps, pain, or breast discharge.  Yearly mammograms ordered.  Ordered mammogram today.  Recommended use of Vitamin D and getting adequate calcium in her diet. (1500mg)  Reviewed exercise as a preventative health measures.   Reviewed BMI and weight loss as preventative health measures.   Colonoscopy recommended.  Reviewed risks of ERT including increased risk of breast cancer, increased blood clots, increased heart disease.  Patient strongly desires to stay on or start ERT.  She understands we will use the lowest amount that adequately controls her symptoms.  Recommended Flu Vaccine in Fall of each year.  RTC in 1 year or PRN with problems.  Return in about 1 year (around 1/23/2026) for Annual physical.   Rev with pt-- usually progesterone is the one that causes moodiness but everyone is different.  Try cutting patch in half x one month.  Then can try taking 200 mg progesterone at night.  She will let me know how she is.        Maria Elena Castellanos, APRN  01/23/2025

## 2025-01-24 LAB — REF LAB TEST METHOD: NORMAL
